# Patient Record
Sex: MALE | Race: WHITE | NOT HISPANIC OR LATINO | Employment: OTHER | URBAN - NONMETROPOLITAN AREA
[De-identification: names, ages, dates, MRNs, and addresses within clinical notes are randomized per-mention and may not be internally consistent; named-entity substitution may affect disease eponyms.]

---

## 2017-01-25 ENCOUNTER — LAB (OUTPATIENT)
Dept: LAB | Facility: OTHER | Age: 82
End: 2017-01-25

## 2017-01-25 DIAGNOSIS — E11.9 TYPE 2 DIABETES MELLITUS WITHOUT COMPLICATION (HCC): ICD-10-CM

## 2017-01-25 DIAGNOSIS — N18.30 CHRONIC KIDNEY DISEASE, STAGE 3 (HCC): ICD-10-CM

## 2017-01-25 DIAGNOSIS — D50.9 IRON DEFICIENCY ANEMIA: ICD-10-CM

## 2017-01-25 DIAGNOSIS — E78.5 HYPERLIPIDEMIA, UNSPECIFIED HYPERLIPIDEMIA TYPE: ICD-10-CM

## 2017-01-25 DIAGNOSIS — E78.5 HYPERLIPIDEMIA, UNSPECIFIED HYPERLIPIDEMIA TYPE: Primary | ICD-10-CM

## 2017-01-25 DIAGNOSIS — I10 ESSENTIAL HYPERTENSION: ICD-10-CM

## 2017-01-25 LAB
ALBUMIN SERPL-MCNC: 4.3 G/DL (ref 3.2–5.5)
ALBUMIN/GLOB SERPL: 1.3 G/DL (ref 1–3)
ALP SERPL-CCNC: 101 U/L (ref 15–121)
ALT SERPL W P-5'-P-CCNC: 15 U/L (ref 10–60)
ANION GAP SERPL CALCULATED.3IONS-SCNC: 8 MMOL/L (ref 5–15)
AST SERPL-CCNC: 20 U/L (ref 10–60)
BILIRUB SERPL-MCNC: 0.8 MG/DL (ref 0.2–1)
BUN BLD-MCNC: 42 MG/DL (ref 8–25)
BUN/CREAT SERPL: 20 (ref 7–25)
CALCIUM SPEC-SCNC: 9.7 MG/DL (ref 8.4–10.8)
CHLORIDE SERPL-SCNC: 102 MMOL/L (ref 100–112)
CHOLEST SERPL-MCNC: 246 MG/DL (ref 150–200)
CO2 SERPL-SCNC: 30 MMOL/L (ref 20–32)
CREAT BLD-MCNC: 2.1 MG/DL (ref 0.4–1.3)
DEPRECATED RDW RBC AUTO: 61 FL (ref 35.1–43.9)
ERYTHROCYTE [DISTWIDTH] IN BLOOD BY AUTOMATED COUNT: 19.7 % (ref 11.5–14.5)
GFR SERPL CREATININE-BSD FRML MDRD: 30 ML/MIN/1.73 (ref 42–98)
GLOBULIN UR ELPH-MCNC: 3.2 GM/DL (ref 2.5–4.6)
GLUCOSE BLD-MCNC: 112 MG/DL (ref 70–100)
HCT VFR BLD AUTO: 39 % (ref 39–49)
HDLC SERPL-MCNC: 42 MG/DL (ref 35–100)
HGB BLD-MCNC: 12.4 G/DL (ref 13.7–17.3)
LDLC SERPL CALC-MCNC: 143 MG/DL
LDLC/HDLC SERPL: 3.41 {RATIO}
MCH RBC QN AUTO: 27.3 PG (ref 26.5–34)
MCHC RBC AUTO-ENTMCNC: 31.8 G/DL (ref 31.5–36.3)
MCV RBC AUTO: 85.7 FL (ref 80–98)
PLATELET # BLD AUTO: 221 10*3/MM3 (ref 150–450)
PMV BLD AUTO: 10.2 FL (ref 8–12)
POTASSIUM BLD-SCNC: 4.8 MMOL/L (ref 3.4–5.4)
PROT SERPL-MCNC: 7.5 G/DL (ref 6.7–8.2)
RBC # BLD AUTO: 4.55 10*6/MM3 (ref 4.37–5.74)
SODIUM BLD-SCNC: 140 MMOL/L (ref 134–146)
TRIGL SERPL-MCNC: 304 MG/DL (ref 35–160)
VLDLC SERPL-MCNC: 60.8 MG/DL
WBC NRBC COR # BLD: 7.53 10*3/MM3 (ref 3.2–9.8)

## 2017-01-25 PROCEDURE — 83036 HEMOGLOBIN GLYCOSYLATED A1C: CPT | Performed by: INTERNAL MEDICINE

## 2017-01-25 PROCEDURE — 80061 LIPID PANEL: CPT | Performed by: INTERNAL MEDICINE

## 2017-01-25 PROCEDURE — 82728 ASSAY OF FERRITIN: CPT | Performed by: INTERNAL MEDICINE

## 2017-01-25 PROCEDURE — 36415 COLL VENOUS BLD VENIPUNCTURE: CPT | Performed by: INTERNAL MEDICINE

## 2017-01-25 PROCEDURE — 85027 COMPLETE CBC AUTOMATED: CPT | Performed by: INTERNAL MEDICINE

## 2017-01-25 PROCEDURE — 80053 COMPREHEN METABOLIC PANEL: CPT | Performed by: INTERNAL MEDICINE

## 2017-01-25 PROCEDURE — 82043 UR ALBUMIN QUANTITATIVE: CPT | Performed by: INTERNAL MEDICINE

## 2017-01-25 PROCEDURE — 84443 ASSAY THYROID STIM HORMONE: CPT | Performed by: INTERNAL MEDICINE

## 2017-01-26 LAB
ALBUMIN UR-MCNC: 0.7 MG/L
FERRITIN SERPL-MCNC: 22.6 NG/ML (ref 17.9–464)
HBA1C MFR BLD: 6.15 % (ref 4–5.6)
TSH SERPL DL<=0.05 MIU/L-ACNC: 1.52 MIU/ML (ref 0.46–4.68)

## 2017-01-27 RX ORDER — OMEPRAZOLE 40 MG/1
40 CAPSULE, DELAYED RELEASE ORAL EVERY MORNING
Qty: 30 CAPSULE | Refills: 11 | Status: SHIPPED | OUTPATIENT
Start: 2017-01-27 | End: 2018-01-22 | Stop reason: SDUPTHER

## 2017-02-07 ENCOUNTER — OFFICE VISIT (OUTPATIENT)
Dept: FAMILY MEDICINE CLINIC | Facility: CLINIC | Age: 82
End: 2017-02-07

## 2017-02-07 VITALS
TEMPERATURE: 97 F | HEART RATE: 60 BPM | WEIGHT: 171 LBS | BODY MASS INDEX: 25.33 KG/M2 | SYSTOLIC BLOOD PRESSURE: 130 MMHG | HEIGHT: 69 IN | DIASTOLIC BLOOD PRESSURE: 62 MMHG

## 2017-02-07 DIAGNOSIS — C61 MALIGNANT TUMOR OF PROSTATE (HCC): ICD-10-CM

## 2017-02-07 DIAGNOSIS — E11.9 TYPE 2 DIABETES MELLITUS WITHOUT COMPLICATION, WITHOUT LONG-TERM CURRENT USE OF INSULIN (HCC): Primary | ICD-10-CM

## 2017-02-07 DIAGNOSIS — D50.9 IRON DEFICIENCY ANEMIA, UNSPECIFIED IRON DEFICIENCY ANEMIA TYPE: ICD-10-CM

## 2017-02-07 DIAGNOSIS — K21.9 GASTROESOPHAGEAL REFLUX DISEASE WITHOUT ESOPHAGITIS: ICD-10-CM

## 2017-02-07 DIAGNOSIS — I10 ESSENTIAL HYPERTENSION: ICD-10-CM

## 2017-02-07 DIAGNOSIS — N18.30 CHRONIC KIDNEY DISEASE, STAGE 3 (HCC): ICD-10-CM

## 2017-02-07 DIAGNOSIS — E78.5 HYPERLIPIDEMIA, UNSPECIFIED HYPERLIPIDEMIA TYPE: ICD-10-CM

## 2017-02-07 DIAGNOSIS — I87.2 PERIPHERAL VENOUS INSUFFICIENCY: ICD-10-CM

## 2017-02-07 DIAGNOSIS — I73.9 PERIPHERAL VASCULAR DISEASE (HCC): ICD-10-CM

## 2017-02-07 PROCEDURE — 99214 OFFICE O/P EST MOD 30 MIN: CPT | Performed by: INTERNAL MEDICINE

## 2017-02-07 NOTE — PROGRESS NOTES
"Subjective       History of Present Illness     Sy Florez is a 88 y.o. male here for 6-month follow up on CKD, CAD, PVD, PAD, hypertension, high cholesterol, and impaired fasting glucose among other issues. He has not had any recent flares of his autoimmune skin disease, which fails to fit typical diagnostic criteria.  He continues on low dose oral prednisone daily, which doesn't seem to be causing elevation in glucose control.  He had an ECHO ordered last week by Dr. Mireles, cardiologist.  He hasn't heard the results currently.    He had a lumbar laminectomy completed at [facility] 11/2015. He continues to have claudication and neuropathy symptoms in his bilateral lower extremities.  He continues on Neurontin p.r.n. with only minimal improvement in symptoms.      He had Pneumovax 01/01/1998.  He will have Prevnar on his way out today. He had influenza vaccine in November.       GERD symptoms adequately controlled with Prilosec.      We started him on oral iron replacement therapy six months ago after iron levels drifted down with ferritin down to 21.2.  Dr. Jay did GI work up in the past including endoscopy as well as colonoscopy with no etiology found.  Iron level is improved with ferritin 22.6 on ferrous gluconate once daily.  Hemoglobin improved to 12.1.       We recommended he restart the Lipitor at least 3-4 times weekly six months ago  to get better control of his lipids with his CAD.  He reports he is not currently taking the Lipitor.  He has experienced myalgias/arthralgias in the past with statins.     Blood pressure 130/62, pulse 60, temperature 97 °F (36.1 °C), temperature source Oral, height 69\" (175.3 cm), weight 171 lb (77.6 kg).  Weight is up 7 pounds in the past six months.     The patient's relevant past medical, surgical, and social history was reviewed in Epic.   Lab results are reviewed with the patient today.  Fasting glucose 112.   A1c is 6.1.  Renal function stable with " creatinine 2..1.  Liver function normal.  Total cholesterol elevated, but improved at 246.  Triglycerides 304.  .  LDL/HDL ratio 3.41.    Review of Systems   Constitutional: Negative for chills, fatigue and fever.   HENT: Negative for congestion, ear pain, postnasal drip, sinus pressure and sore throat.    Respiratory: Negative for cough, shortness of breath and wheezing.    Cardiovascular: Negative for chest pain, palpitations and leg swelling.   Gastrointestinal: Negative for abdominal pain, blood in stool, constipation, diarrhea, nausea and vomiting.   Endocrine: Negative for cold intolerance, heat intolerance, polydipsia and polyuria.   Genitourinary: Negative for dysuria, frequency, hematuria and urgency.   Skin: Negative for rash.   Neurological: Negative for syncope and weakness.        Objective     Physical Exam   Constitutional: He is oriented to person, place, and time. He appears well-developed and well-nourished. No distress.   HENT:   Head: Normocephalic and atraumatic.   Nose: Right sinus exhibits no maxillary sinus tenderness and no frontal sinus tenderness. Left sinus exhibits no maxillary sinus tenderness and no frontal sinus tenderness.   Mouth/Throat: Uvula is midline, oropharynx is clear and moist and mucous membranes are normal. No oral lesions. No tonsillar exudate.   Eyes: Conjunctivae and EOM are normal. Pupils are equal, round, and reactive to light.   Neck: Trachea normal. Neck supple. No JVD present. Carotid bruit is not present. No tracheal deviation present. No thyroid mass and no thyromegaly present.   Cardiovascular: Normal rate, regular rhythm and normal heart sounds.   No extrasystoles are present. PMI is not displaced.    No murmur heard.  Pulmonary/Chest: Effort normal and breath sounds normal. No accessory muscle usage. No respiratory distress. He has no decreased breath sounds. He has no wheezes. He has no rhonchi. He has no rales.   Abdominal: Soft. Bowel sounds are  normal. He exhibits no distension. There is no hepatosplenomegaly. There is no tenderness.       Vascular Status -  His exam exhibits right foot edema (Trace edema bilateral ankles and diminished pulses bilaterally). His exam exhibits right foot vasculature abnormal. His exam exhibits left foot edema. His exam exhibits left foot vasculature abnormal.  Lymphadenopathy:     He has no cervical adenopathy.   Neurological: He is alert and oriented to person, place, and time. No cranial nerve deficit. Coordination normal.   Skin: Skin is warm, dry and intact. No rash noted. No cyanosis. Nails show no clubbing.   Psychiatric: He has a normal mood and affect. His speech is normal and behavior is normal. Thought content normal.   Vitals reviewed.       Assessment/Plan      Continue with current prescription medications.  He will have Prevnar on his way out today.     Scribed for Dr. Rodriguez by Johanna Reyes [facility].     Diagnoses and all orders for this visit:    Type 2 diabetes mellitus without complication, without long-term current use of insulin  -     Comprehensive Metabolic Panel; Future  -     CBC Auto Differential; Future  -     Vitamin B12; Future  -     Ferritin; Future  -     Hemoglobin A1c; Future  -     LDL Cholesterol, Direct; Future  -     TSH; Future  -     PSA; Future    Essential hypertension    Hyperlipidemia, unspecified hyperlipidemia type  Comments:  Stopped Lipitor, fall  due to symptoms and lower extremities which may have been statin related or may have been due to his neurogenic and vascular claudication  Orders:  -     LDL Cholesterol, Direct; Future  -     Triglycerides; Future    Peripheral vascular disease    Gastroesophageal reflux disease without esophagitis    Chronic kidney disease, stage 3    Iron deficiency anemia, unspecified iron deficiency anemia type  -     Vitamin B12; Future  -     Ferritin; Future    Malignant tumor of prostate  -     PSA; Future    Peripheral venous  insufficiency    Other orders  -     Ferrous Gluconate 325 (36 FE) MG tablet; Take 1 tablet by mouth Daily.      Lab on 01/25/2017   Component Date Value Ref Range Status   • Glucose 01/25/2017 112* 70 - 100 mg/dL Final   • BUN 01/25/2017 42* 8 - 25 mg/dL Final   • Creatinine 01/25/2017 2.10* 0.40 - 1.30 mg/dL Final   • Sodium 01/25/2017 140  134 - 146 mmol/L Final   • Potassium 01/25/2017 4.8  3.4 - 5.4 mmol/L Final   • Chloride 01/25/2017 102  100 - 112 mmol/L Final   • CO2 01/25/2017 30.0  20.0 - 32.0 mmol/L Final   • Calcium 01/25/2017 9.7  8.4 - 10.8 mg/dL Final   • Total Protein 01/25/2017 7.5  6.7 - 8.2 g/dL Final   • Albumin 01/25/2017 4.30  3.20 - 5.50 g/dL Final   • ALT (SGPT) 01/25/2017 15  10 - 60 U/L Final   • AST (SGOT) 01/25/2017 20  10 - 60 U/L Final   • Alkaline Phosphatase 01/25/2017 101  15 - 121 U/L Final   • Total Bilirubin 01/25/2017 0.8  0.2 - 1.0 mg/dL Final   • eGFR Non African Amer 01/25/2017 30* 42 - 98 mL/min/1.73 Final   • Globulin 01/25/2017 3.2  2.5 - 4.6 gm/dL Final   • A/G Ratio 01/25/2017 1.3  1.0 - 3.0 g/dL Final   • BUN/Creatinine Ratio 01/25/2017 20.0  7.0 - 25.0 Final   • Anion Gap 01/25/2017 8.0  5.0 - 15.0 mmol/L Final   • Hemoglobin A1C 01/25/2017 6.15* 4 - 5.6 % Final   • WBC 01/25/2017 7.53  3.20 - 9.80 10*3/mm3 Final   • RBC 01/25/2017 4.55  4.37 - 5.74 10*6/mm3 Final   • Hemoglobin 01/25/2017 12.4* 13.7 - 17.3 g/dL Final   • Hematocrit 01/25/2017 39.0  39.0 - 49.0 % Final   • MCV 01/25/2017 85.7  80.0 - 98.0 fL Final   • MCH 01/25/2017 27.3  26.5 - 34.0 pg Final   • MCHC 01/25/2017 31.8  31.5 - 36.3 g/dL Final   • RDW 01/25/2017 19.7* 11.5 - 14.5 % Final   • RDW-SD 01/25/2017 61.0* 35.1 - 43.9 fl Final   • MPV 01/25/2017 10.2  8.0 - 12.0 fL Final   • Platelets 01/25/2017 221  150 - 450 10*3/mm3 Final   • Ferritin 01/25/2017 22.60  17.90 - 464.00 ng/mL Final   • TSH 01/25/2017 1.520  0.460 - 4.680 mIU/mL Final   • Microalbumin, Urine 01/25/2017 0.7  mg/L Final   • Total  Cholesterol 01/25/2017 246* 150 - 200 mg/dL Final   • Triglycerides 01/25/2017 304* 35 - 160 mg/dL Final   • HDL Cholesterol 01/25/2017 42  35 - 100 mg/dL Final   • LDL Cholesterol  01/25/2017 143  mg/dL Final   • VLDL Cholesterol 01/25/2017 60.8  mg/dL Final   • LDL/HDL Ratio 01/25/2017 3.41   Final   ]

## 2017-02-27 RX ORDER — GABAPENTIN 600 MG/1
TABLET ORAL
Qty: 60 TABLET | Refills: 5 | Status: SHIPPED | OUTPATIENT
Start: 2017-02-27 | End: 2017-07-10 | Stop reason: SDUPTHER

## 2017-03-13 RX ORDER — RAMIPRIL 5 MG/1
5 CAPSULE ORAL DAILY
Qty: 30 CAPSULE | Refills: 5 | Status: SHIPPED | OUTPATIENT
Start: 2017-03-13 | End: 2017-06-14 | Stop reason: SDUPTHER

## 2017-05-01 RX ORDER — PREDNISONE 1 MG/1
TABLET ORAL
Qty: 60 TABLET | Refills: 6 | Status: SHIPPED | OUTPATIENT
Start: 2017-05-01 | End: 2017-07-10

## 2017-06-14 RX ORDER — RAMIPRIL 5 MG/1
5 CAPSULE ORAL DAILY
Qty: 30 CAPSULE | Refills: 5 | Status: SHIPPED | OUTPATIENT
Start: 2017-06-14 | End: 2017-08-18 | Stop reason: SDUPTHER

## 2017-07-10 ENCOUNTER — OFFICE VISIT (OUTPATIENT)
Dept: FAMILY MEDICINE CLINIC | Facility: CLINIC | Age: 82
End: 2017-07-10

## 2017-07-10 VITALS
WEIGHT: 167.4 LBS | TEMPERATURE: 98 F | SYSTOLIC BLOOD PRESSURE: 112 MMHG | DIASTOLIC BLOOD PRESSURE: 68 MMHG | HEIGHT: 69 IN | BODY MASS INDEX: 24.79 KG/M2 | HEART RATE: 64 BPM

## 2017-07-10 DIAGNOSIS — I73.9 CLAUDICATION (HCC): Primary | Chronic | ICD-10-CM

## 2017-07-10 DIAGNOSIS — E78.5 HYPERLIPIDEMIA, UNSPECIFIED HYPERLIPIDEMIA TYPE: Chronic | ICD-10-CM

## 2017-07-10 DIAGNOSIS — I73.9 PERIPHERAL VASCULAR DISEASE (HCC): Chronic | ICD-10-CM

## 2017-07-10 PROCEDURE — 99213 OFFICE O/P EST LOW 20 MIN: CPT | Performed by: INTERNAL MEDICINE

## 2017-07-10 RX ORDER — GABAPENTIN 600 MG/1
TABLET ORAL
Qty: 60 TABLET | Refills: 5 | Status: SHIPPED | OUTPATIENT
Start: 2017-07-10 | End: 2018-03-12 | Stop reason: SDUPTHER

## 2017-07-10 NOTE — PROGRESS NOTES
Subjective     History of Present Illness     Sy Florez is a 88 y.o. male here for evaluation of chronic bilateral lower extremity pain.  He had a lumbar laminectomy completed at [facility] 11/2015 in hopes that symptoms were neurogenic, but he did not experience any significant improvement in symptoms. He continues to have symptoms of vascular claudication and also some neuropathy symptoms in his bilateral lower extremities. He continues on Neurontin, but reports the pain has gotten to the point, it is greatly limiting his walking and activities of daily living. Pain starts in the thighs and progresses down to the feet.  He can walk only two blocks without having to stop due to pain.   Pain is relieved with sitting for 10-15 minutes.  He has undergone extensive revascularization surgeries in the past.  Dr. Brennan has told him that he has no additional revascularization procedures to offer.  His daughter is a nurse practitioner in [place] and is wanting him to go to a vascular surgeon in [place] for further evaluation..   We are referring him to vascular surgery in [place].  He will notify us of the name of the vascular surgeon his daughter is recommending.  He denies claudication symptoms in the upper extremities. His Neurontin is refilled today. Patient understands the risks associated with this controlled medication, including tolerance and addiction.        Review of Systems   Constitutional: Negative for chills, fatigue and fever.   HENT: Negative for congestion, ear pain, postnasal drip, sinus pressure and sore throat.    Respiratory: Negative for cough, shortness of breath and wheezing.    Cardiovascular: Negative for chest pain, palpitations and leg swelling.   Gastrointestinal: Negative for abdominal pain, blood in stool, constipation, diarrhea, nausea and vomiting.   Endocrine: Negative for cold intolerance, heat intolerance, polydipsia and polyuria.   Genitourinary:  "Negative for dysuria, frequency, hematuria and urgency.   Skin: Negative for rash.   Neurological: Negative for syncope and weakness.     PHQ-9 Depression Screening 7/10/2017   Little interest or pleasure in doing things 0   Feeling down, depressed, or hopeless 0   PHQ-9 Total Score 0        Objective      Vitals:    07/10/17 1109   BP: 112/68   Pulse: 64   Temp: 98 °F (36.7 °C)   TempSrc: Oral   Weight: 167 lb 6.4 oz (75.9 kg)   Height: 69\" (175.3 cm)       Physical Exam   Constitutional: He is oriented to person, place, and time. He appears well-developed and well-nourished. No distress.   HENT:   Mouth/Throat: Uvula is midline.   Eyes: Conjunctivae are normal.   Neck: Trachea normal. Neck supple. No JVD present. Carotid bruit is not present. No tracheal deviation present. No thyroid mass and no thyromegaly present.   Cardiovascular: Normal rate, regular rhythm and normal heart sounds.   No extrasystoles are present. PMI is not displaced.    No murmur heard.  Pulmonary/Chest: Effort normal and breath sounds normal. No accessory muscle usage. No respiratory distress. He has no decreased breath sounds. He has no wheezes. He has no rhonchi. He has no rales.   Abdominal: Soft. Bowel sounds are normal. He exhibits no distension. There is no hepatosplenomegaly. There is no tenderness.       Vascular Status -  His exam exhibits right foot edema (1+ pitting edema bilateral lower extermities with diminished pulses bilateral ankles. ). His exam exhibits right foot vasculature abnormal. His exam exhibits left foot edema. His exam exhibits left foot vasculature abnormal.  Lymphadenopathy:     He has no cervical adenopathy.   Neurological: He is alert and oriented to person, place, and time. No cranial nerve deficit. Coordination normal.   Skin: Skin is warm, dry and intact. No rash noted. No cyanosis. Nails show no clubbing.   Psychiatric: He has a normal mood and affect. His speech is normal and behavior is normal. Thought " content normal.   Vitals reviewed.     Future Appointments  Date Time Provider Department Center   8/8/2017 10:30 AM John Rodriguez MD [facility] None         Assessment/Plan      I am referring him to vascular surgery in [place] where his daughter lives.  She will call and notify us of the name of the vascular surgeon we will be referring him to.       He is given a refill on Neurontin 600 mg to take one tablet b.i.d.  Patient understands the risks associated with this controlled medication, including tolerance and addiction.  He also agrees to only obtain this medication from me, and not from a another provider, unless that provider is covering for me in my absence.  He also agrees to be compliant in dosing, and not self adjust the dose of medication.  A signed controlled substance agreement is on file, and he has received a controlled substance education sheet at this a previous visit.  He has also signed a consent for treatment with a controlled substance as per [facility] policy. TRAE was obtained.      Scribed for Dr. Rodriguez by Johanna Reyes [facility].     Diagnoses and all orders for this visit:    Claudication    Peripheral vascular disease    Hyperlipidemia, unspecified hyperlipidemia type    Other orders  -     gabapentin (NEURONTIN) 600 MG tablet; Take one tab twice daily      No visits with results within 3 Week(s) from this visit.  Latest known visit with results is:    Lab on 01/25/2017   Component Date Value Ref Range Status   • Glucose 01/25/2017 112* 70 - 100 mg/dL Final   • BUN 01/25/2017 42* 8 - 25 mg/dL Final   • Creatinine 01/25/2017 2.10* 0.40 - 1.30 mg/dL Final   • Sodium 01/25/2017 140  134 - 146 mmol/L Final   • Potassium 01/25/2017 4.8  3.4 - 5.4 mmol/L Final   • Chloride 01/25/2017 102  100 - 112 mmol/L Final   • CO2 01/25/2017 30.0  20.0 - 32.0 mmol/L Final   • Calcium 01/25/2017 9.7  8.4 - 10.8 mg/dL Final   • Total Protein 01/25/2017 7.5  6.7 - 8.2 g/dL Final   • Albumin  01/25/2017 4.30  3.20 - 5.50 g/dL Final   • ALT (SGPT) 01/25/2017 15  10 - 60 U/L Final   • AST (SGOT) 01/25/2017 20  10 - 60 U/L Final   • Alkaline Phosphatase 01/25/2017 101  15 - 121 U/L Final   • Total Bilirubin 01/25/2017 0.8  0.2 - 1.0 mg/dL Final   • eGFR Non African Amer 01/25/2017 30* 42 - 98 mL/min/1.73 Final   • Globulin 01/25/2017 3.2  2.5 - 4.6 gm/dL Final   • A/G Ratio 01/25/2017 1.3  1.0 - 3.0 g/dL Final   • BUN/Creatinine Ratio 01/25/2017 20.0  7.0 - 25.0 Final   • Anion Gap 01/25/2017 8.0  5.0 - 15.0 mmol/L Final   • Hemoglobin A1C 01/25/2017 6.15* 4 - 5.6 % Final   • WBC 01/25/2017 7.53  3.20 - 9.80 10*3/mm3 Final   • RBC 01/25/2017 4.55  4.37 - 5.74 10*6/mm3 Final   • Hemoglobin 01/25/2017 12.4* 13.7 - 17.3 g/dL Final   • Hematocrit 01/25/2017 39.0  39.0 - 49.0 % Final   • MCV 01/25/2017 85.7  80.0 - 98.0 fL Final   • MCH 01/25/2017 27.3  26.5 - 34.0 pg Final   • MCHC 01/25/2017 31.8  31.5 - 36.3 g/dL Final   • RDW 01/25/2017 19.7* 11.5 - 14.5 % Final   • RDW-SD 01/25/2017 61.0* 35.1 - 43.9 fl Final   • MPV 01/25/2017 10.2  8.0 - 12.0 fL Final   • Platelets 01/25/2017 221  150 - 450 10*3/mm3 Final   • Ferritin 01/25/2017 22.60  17.90 - 464.00 ng/mL Final   • TSH 01/25/2017 1.520  0.460 - 4.680 mIU/mL Final   • Microalbumin, Urine 01/25/2017 0.7  mg/L Final   • Total Cholesterol 01/25/2017 246* 150 - 200 mg/dL Final   • Triglycerides 01/25/2017 304* 35 - 160 mg/dL Final   • HDL Cholesterol 01/25/2017 42  35 - 100 mg/dL Final   • LDL Cholesterol  01/25/2017 143  mg/dL Final   • VLDL Cholesterol 01/25/2017 60.8  mg/dL Final   • LDL/HDL Ratio 01/25/2017 3.41   Final   ]

## 2017-07-11 DIAGNOSIS — I73.9 PVD (PERIPHERAL VASCULAR DISEASE) (HCC): ICD-10-CM

## 2017-07-11 DIAGNOSIS — I73.9 CLAUDICATION (HCC): Primary | ICD-10-CM

## 2017-08-02 ENCOUNTER — LAB (OUTPATIENT)
Dept: LAB | Facility: OTHER | Age: 82
End: 2017-08-02

## 2017-08-02 DIAGNOSIS — C61 MALIGNANT TUMOR OF PROSTATE (HCC): ICD-10-CM

## 2017-08-02 DIAGNOSIS — D50.9 IRON DEFICIENCY ANEMIA, UNSPECIFIED IRON DEFICIENCY ANEMIA TYPE: ICD-10-CM

## 2017-08-02 DIAGNOSIS — E11.9 TYPE 2 DIABETES MELLITUS WITHOUT COMPLICATION, WITHOUT LONG-TERM CURRENT USE OF INSULIN (HCC): ICD-10-CM

## 2017-08-02 DIAGNOSIS — E78.5 HYPERLIPIDEMIA, UNSPECIFIED HYPERLIPIDEMIA TYPE: ICD-10-CM

## 2017-08-02 LAB
ALBUMIN SERPL-MCNC: 4.1 G/DL (ref 3.2–5.5)
ALBUMIN/GLOB SERPL: 1.4 G/DL (ref 1–3)
ALP SERPL-CCNC: 121 U/L (ref 15–121)
ALT SERPL W P-5'-P-CCNC: 14 U/L (ref 10–60)
ANION GAP SERPL CALCULATED.3IONS-SCNC: 12 MMOL/L (ref 5–15)
ARTICHOKE IGE QN: 92 MG/DL (ref 0–129)
AST SERPL-CCNC: 21 U/L (ref 10–60)
BASOPHILS # BLD AUTO: 0.07 10*3/MM3 (ref 0–0.2)
BASOPHILS NFR BLD AUTO: 1 % (ref 0–2)
BILIRUB SERPL-MCNC: 0.7 MG/DL (ref 0.2–1)
BUN BLD-MCNC: 41 MG/DL (ref 8–25)
BUN/CREAT SERPL: 17.8 (ref 7–25)
CALCIUM SPEC-SCNC: 9.4 MG/DL (ref 8.4–10.8)
CHLORIDE SERPL-SCNC: 101 MMOL/L (ref 100–112)
CO2 SERPL-SCNC: 26 MMOL/L (ref 20–32)
CREAT BLD-MCNC: 2.3 MG/DL (ref 0.4–1.3)
DEPRECATED RDW RBC AUTO: 45.8 FL (ref 35.1–43.9)
EOSINOPHIL # BLD AUTO: 0.32 10*3/MM3 (ref 0–0.7)
EOSINOPHIL NFR BLD AUTO: 4.6 % (ref 0–7)
ERYTHROCYTE [DISTWIDTH] IN BLOOD BY AUTOMATED COUNT: 14.5 % (ref 11.5–14.5)
GFR SERPL CREATININE-BSD FRML MDRD: 27 ML/MIN/1.73 (ref 42–98)
GLOBULIN UR ELPH-MCNC: 3 GM/DL (ref 2.5–4.6)
GLUCOSE BLD-MCNC: 131 MG/DL (ref 70–100)
HBA1C MFR BLD: 6 % (ref 4–5.6)
HCT VFR BLD AUTO: 34.3 % (ref 39–49)
HGB BLD-MCNC: 11 G/DL (ref 13.7–17.3)
LYMPHOCYTES # BLD AUTO: 1.82 10*3/MM3 (ref 0.6–4.2)
LYMPHOCYTES NFR BLD AUTO: 26.3 % (ref 10–50)
MCH RBC QN AUTO: 28.2 PG (ref 26.5–34)
MCHC RBC AUTO-ENTMCNC: 32.1 G/DL (ref 31.5–36.3)
MCV RBC AUTO: 87.9 FL (ref 80–98)
MONOCYTES # BLD AUTO: 0.57 10*3/MM3 (ref 0–0.9)
MONOCYTES NFR BLD AUTO: 8.2 % (ref 0–12)
NEUTROPHILS # BLD AUTO: 4.15 10*3/MM3 (ref 2–8.6)
NEUTROPHILS NFR BLD AUTO: 59.9 % (ref 37–80)
PLATELET # BLD AUTO: 240 10*3/MM3 (ref 150–450)
PMV BLD AUTO: 10.2 FL (ref 8–12)
POTASSIUM BLD-SCNC: 4.3 MMOL/L (ref 3.4–5.4)
PROT SERPL-MCNC: 7.1 G/DL (ref 6.7–8.2)
RBC # BLD AUTO: 3.9 10*6/MM3 (ref 4.37–5.74)
SODIUM BLD-SCNC: 139 MMOL/L (ref 134–146)
TRIGL SERPL-MCNC: 280 MG/DL (ref 35–160)
WBC NRBC COR # BLD: 6.93 10*3/MM3 (ref 3.2–9.8)

## 2017-08-02 PROCEDURE — 36415 COLL VENOUS BLD VENIPUNCTURE: CPT | Performed by: INTERNAL MEDICINE

## 2017-08-02 PROCEDURE — 83721 ASSAY OF BLOOD LIPOPROTEIN: CPT | Performed by: INTERNAL MEDICINE

## 2017-08-02 PROCEDURE — 84478 ASSAY OF TRIGLYCERIDES: CPT | Performed by: INTERNAL MEDICINE

## 2017-08-02 PROCEDURE — 82607 VITAMIN B-12: CPT | Performed by: INTERNAL MEDICINE

## 2017-08-02 PROCEDURE — 85025 COMPLETE CBC W/AUTO DIFF WBC: CPT | Performed by: INTERNAL MEDICINE

## 2017-08-02 PROCEDURE — 80053 COMPREHEN METABOLIC PANEL: CPT | Performed by: INTERNAL MEDICINE

## 2017-08-02 PROCEDURE — 82728 ASSAY OF FERRITIN: CPT | Performed by: INTERNAL MEDICINE

## 2017-08-02 PROCEDURE — 84443 ASSAY THYROID STIM HORMONE: CPT | Performed by: INTERNAL MEDICINE

## 2017-08-02 PROCEDURE — 84153 ASSAY OF PSA TOTAL: CPT | Performed by: INTERNAL MEDICINE

## 2017-08-02 PROCEDURE — 83036 HEMOGLOBIN GLYCOSYLATED A1C: CPT | Performed by: INTERNAL MEDICINE

## 2017-08-03 LAB
FERRITIN SERPL-MCNC: 20.7 NG/ML (ref 17.9–464)
PSA SERPL-MCNC: 0.15 NG/ML (ref 0–4)
TSH SERPL DL<=0.05 MIU/L-ACNC: 1.8 MIU/ML (ref 0.46–4.68)
VIT B12 BLD-MCNC: 465 PG/ML (ref 239–931)

## 2017-08-08 ENCOUNTER — OFFICE VISIT (OUTPATIENT)
Dept: FAMILY MEDICINE CLINIC | Facility: CLINIC | Age: 82
End: 2017-08-08

## 2017-08-08 VITALS
WEIGHT: 169 LBS | BODY MASS INDEX: 25.03 KG/M2 | HEART RATE: 64 BPM | HEIGHT: 69 IN | TEMPERATURE: 97.6 F | SYSTOLIC BLOOD PRESSURE: 120 MMHG | DIASTOLIC BLOOD PRESSURE: 60 MMHG

## 2017-08-08 DIAGNOSIS — I10 ESSENTIAL HYPERTENSION: Chronic | ICD-10-CM

## 2017-08-08 DIAGNOSIS — N18.30 CHRONIC KIDNEY DISEASE, STAGE 3 (HCC): Chronic | ICD-10-CM

## 2017-08-08 DIAGNOSIS — E11.22 TYPE 2 DIABETES MELLITUS WITH STAGE 3 CHRONIC KIDNEY DISEASE, WITHOUT LONG-TERM CURRENT USE OF INSULIN (HCC): Primary | Chronic | ICD-10-CM

## 2017-08-08 DIAGNOSIS — D63.1 ANEMIA IN CHRONIC KIDNEY DISEASE: Chronic | ICD-10-CM

## 2017-08-08 DIAGNOSIS — I25.10 CORONARY ARTERIOSCLEROSIS: Chronic | ICD-10-CM

## 2017-08-08 DIAGNOSIS — E78.2 MIXED HYPERLIPIDEMIA: Chronic | ICD-10-CM

## 2017-08-08 DIAGNOSIS — I87.2 PERIPHERAL VENOUS INSUFFICIENCY: Chronic | ICD-10-CM

## 2017-08-08 DIAGNOSIS — K21.9 GASTROESOPHAGEAL REFLUX DISEASE WITHOUT ESOPHAGITIS: Chronic | ICD-10-CM

## 2017-08-08 DIAGNOSIS — N18.9 ANEMIA IN CHRONIC KIDNEY DISEASE: Chronic | ICD-10-CM

## 2017-08-08 DIAGNOSIS — N18.30 TYPE 2 DIABETES MELLITUS WITH STAGE 3 CHRONIC KIDNEY DISEASE, WITHOUT LONG-TERM CURRENT USE OF INSULIN (HCC): Primary | Chronic | ICD-10-CM

## 2017-08-08 DIAGNOSIS — I73.9 PERIPHERAL VASCULAR DISEASE (HCC): Chronic | ICD-10-CM

## 2017-08-08 PROCEDURE — 99214 OFFICE O/P EST MOD 30 MIN: CPT | Performed by: INTERNAL MEDICINE

## 2017-08-08 PROCEDURE — 96372 THER/PROPH/DIAG INJ SC/IM: CPT | Performed by: INTERNAL MEDICINE

## 2017-08-08 RX ORDER — CHOLECALCIFEROL (VITAMIN D3) 125 MCG
500 CAPSULE ORAL DAILY
COMMUNITY
Start: 2017-08-08 | End: 2017-12-01

## 2017-08-08 RX ORDER — CYANOCOBALAMIN 1000 UG/ML
1000 INJECTION, SOLUTION INTRAMUSCULAR; SUBCUTANEOUS ONCE
Status: COMPLETED | OUTPATIENT
Start: 2017-08-08 | End: 2017-08-08

## 2017-08-08 RX ADMIN — CYANOCOBALAMIN 1000 MCG: 1000 INJECTION, SOLUTION INTRAMUSCULAR; SUBCUTANEOUS at 16:20

## 2017-08-08 NOTE — PROGRESS NOTES
Subjective     Sy Folrez is a 88 y.o. male.     History of Present Illness   Grant is a very pleasant, very complex patient here for 6-month follow up of CKD, CAD, PVD, PAD, hypertension, high cholesterol, iron deficiency anemia, and diet-controlled type 2 diabetes, among other issues. He has not had any recent flares of his autoimmune skin disease, which fails to fit typical diagnostic criteria.    He is struggling with chronic claudication symptoms of the bilateral lower extremity.  He has both neurogenic and vascular claudication.  It has been difficult to clearly ascertain which of these causes the majority of his symptoms.  He seems to have multifactorial lower extremity pain.  He will be seeing a vascular surgeon at [facility] later this week to see if there is anything else they can offer.  His recent lumbar laminectomy failed to produce any significant improvement in symptoms.    His blood pressure is well controlled today.  His weight is stable.    His labs are all reviewed with results listed below.  Fasting glucose is 131 and A1c is 6.0.  LDL is 92 without statin therapy.  The patient has discontinued statins on multiple occasions feeling that they worsened his lower extremity symptoms, but he is not really sure.  He agrees to the open to considering resumption of statin therapy or other lipid management options if recommended by the vascular surgeon at [facility].    Struggle with bilateral lower extremity pain and claudication features.  He is describing is very likely some rest claudication now at night.  He denies any ischemic digits.    His labs are all reviewed with results listed below.  Vitamin B12 is 465.  Serum creatinine is gradually increasing, now at 2.3.  Ferritin is relatively stable at 21.      Review of Systems   Constitutional: Negative for chills, fatigue and fever.   HENT: Negative for congestion, ear pain, postnasal drip, sinus pressure and sore throat.   "  Respiratory: Negative for cough, shortness of breath and wheezing.    Cardiovascular: Negative for chest pain, palpitations and leg swelling.   Gastrointestinal: Negative for abdominal pain, blood in stool, constipation, diarrhea, nausea and vomiting.   Endocrine: Negative for cold intolerance, heat intolerance, polydipsia and polyuria.   Genitourinary: Negative for dysuria, frequency, hematuria and urgency.   Musculoskeletal: Positive for gait problem and myalgias.   Skin: Negative for rash.   Neurological: Negative for syncope and weakness.       Objective     /60  Pulse 64  Temp 97.6 °F (36.4 °C) (Oral)   Ht 69\" (175.3 cm)  Wt 169 lb (76.7 kg)  BMI 24.96 kg/m2    Physical Exam   Constitutional: He is oriented to person, place, and time. He appears well-developed and well-nourished. No distress.   HENT:   Head: Normocephalic and atraumatic.   Nose: Right sinus exhibits no maxillary sinus tenderness and no frontal sinus tenderness. Left sinus exhibits no maxillary sinus tenderness and no frontal sinus tenderness.   Mouth/Throat: Uvula is midline, oropharynx is clear and moist and mucous membranes are normal. No oral lesions. No tonsillar exudate.   Eyes: Conjunctivae and EOM are normal. Pupils are equal, round, and reactive to light.   Neck: Trachea normal. Neck supple. No JVD present. Carotid bruit is not present. No thyromegaly present.   Cardiovascular: Normal rate, regular rhythm and normal heart sounds.   No extrasystoles are present.   No murmur heard.  Pulmonary/Chest: Effort normal and breath sounds normal. No accessory muscle usage. No respiratory distress (chronic lung sounds.). He has no decreased breath sounds. He has no wheezes. He has no rhonchi. He has no rales.   Abdominal: Soft. Bowel sounds are normal. He exhibits no distension. There is no hepatosplenomegaly. There is no tenderness.   Musculoskeletal: Edema: trace dependent peripheral edema     Lymphadenopathy:     He has no cervical " adenopathy.   Neurological: He is alert and oriented to person, place, and time. Coordination normal.   Skin: Skin is warm, dry and intact. No rash noted.   Sun damaged skin   Psychiatric: He has a normal mood and affect. His speech is normal and behavior is normal.   Vitals reviewed.      PHQ-9 Depression Screening 7/10/2017   Little interest or pleasure in doing things 0   Feeling down, depressed, or hopeless 0   PHQ-9 Total Score 0       Assessment/Plan   Continue current  multidrug therapy to continue to address the issues below.    We are giving him a vitamin B12 shot today see if that is helpful.  I've asked the patient start taking OTC vitamin B12 500 daily.    Continue to avoid NSAIDs and other nephrotoxic drugs.    Continue the omeprazole daily.    Continue to keep legs elevated when not ambulating.    Return to clinic in 6 months with fasting labs prior.  Diagnoses and all orders for this visit:    Type 2 diabetes mellitus with stage 3 chronic kidney disease, without long-term current use of insulin  -     Comprehensive Metabolic Panel; Future  -     CBC Auto Differential; Future  -     Hemoglobin A1c; Future  -     Microalbumin / Creatinine Urine Ratio; Future    Essential hypertension    Peripheral vascular disease    Mixed hyperlipidemia  -     Lipid Panel; Future    Coronary arteriosclerosis    Gastroesophageal reflux disease without esophagitis    Chronic kidney disease, stage 3    Peripheral venous insufficiency    Anemia in chronic kidney disease  -     Vitamin B12; Future  -     Ferritin; Future  -     cyanocobalamin injection 1,000 mcg; Inject 1 mL into the shoulder, thigh, or buttocks 1 (One) Time.    Other orders  -     vitamin B-12 (CYANOCOBALAMIN) 500 MCG tablet; Take 1 tablet by mouth Daily.      Lab on 08/02/2017   Component Date Value Ref Range Status   • Glucose 08/02/2017 131* 70 - 100 mg/dL Final   • BUN 08/02/2017 41* 8 - 25 mg/dL Final   • Creatinine 08/02/2017 2.30* 0.40 - 1.30 mg/dL  Final   • Sodium 08/02/2017 139  134 - 146 mmol/L Final   • Potassium 08/02/2017 4.3  3.4 - 5.4 mmol/L Final   • Chloride 08/02/2017 101  100 - 112 mmol/L Final   • CO2 08/02/2017 26.0  20.0 - 32.0 mmol/L Final   • Calcium 08/02/2017 9.4  8.4 - 10.8 mg/dL Final   • Total Protein 08/02/2017 7.1  6.7 - 8.2 g/dL Final   • Albumin 08/02/2017 4.10  3.20 - 5.50 g/dL Final   • ALT (SGPT) 08/02/2017 14  10 - 60 U/L Final   • AST (SGOT) 08/02/2017 21  10 - 60 U/L Final   • Alkaline Phosphatase 08/02/2017 121  15 - 121 U/L Final   • Total Bilirubin 08/02/2017 0.7  0.2 - 1.0 mg/dL Final   • eGFR Non African Amer 08/02/2017 27* 42 - 98 mL/min/1.73 Final   • Globulin 08/02/2017 3.0  2.5 - 4.6 gm/dL Final   • A/G Ratio 08/02/2017 1.4  1.0 - 3.0 g/dL Final   • BUN/Creatinine Ratio 08/02/2017 17.8  7.0 - 25.0 Final   • Anion Gap 08/02/2017 12.0  5.0 - 15.0 mmol/L Final   • WBC 08/02/2017 6.93  3.20 - 9.80 10*3/mm3 Final   • RBC 08/02/2017 3.90* 4.37 - 5.74 10*6/mm3 Final   • Hemoglobin 08/02/2017 11.0* 13.7 - 17.3 g/dL Final   • Hematocrit 08/02/2017 34.3* 39.0 - 49.0 % Final   • MCV 08/02/2017 87.9  80.0 - 98.0 fL Final   • MCH 08/02/2017 28.2  26.5 - 34.0 pg Final   • MCHC 08/02/2017 32.1  31.5 - 36.3 g/dL Final   • RDW 08/02/2017 14.5  11.5 - 14.5 % Final   • RDW-SD 08/02/2017 45.8* 35.1 - 43.9 fl Final   • MPV 08/02/2017 10.2  8.0 - 12.0 fL Final   • Platelets 08/02/2017 240  150 - 450 10*3/mm3 Final   • Neutrophil % 08/02/2017 59.9  37.0 - 80.0 % Final   • Lymphocyte % 08/02/2017 26.3  10.0 - 50.0 % Final   • Monocyte % 08/02/2017 8.2  0.0 - 12.0 % Final   • Eosinophil % 08/02/2017 4.6  0.0 - 7.0 % Final   • Basophil % 08/02/2017 1.0  0.0 - 2.0 % Final   • Neutrophils, Absolute 08/02/2017 4.15  2.00 - 8.60 10*3/mm3 Final   • Lymphocytes, Absolute 08/02/2017 1.82  0.60 - 4.20 10*3/mm3 Final   • Monocytes, Absolute 08/02/2017 0.57  0.00 - 0.90 10*3/mm3 Final   • Eosinophils, Absolute 08/02/2017 0.32  0.00 - 0.70 10*3/mm3 Final    • Basophils, Absolute 08/02/2017 0.07  0.00 - 0.20 10*3/mm3 Final   • Vitamin B-12 08/02/2017 465  239 - 931 pg/mL Final   • Ferritin 08/02/2017 20.70  17.90 - 464.00 ng/mL Final   • Hemoglobin A1C 08/02/2017 6.0* 4 - 5.6 % Final   • LDL Cholesterol  08/02/2017 92  0 - 129 mg/dL Final   • TSH 08/02/2017 1.800  0.460 - 4.680 mIU/mL Final   • PSA 08/02/2017 0.154  0.000 - 4.000 ng/mL Final   • Triglycerides 08/02/2017 280* 35 - 160 mg/dL Final   ]

## 2017-08-18 RX ORDER — RAMIPRIL 5 MG/1
CAPSULE ORAL
Qty: 30 CAPSULE | Refills: 5 | Status: SHIPPED | OUTPATIENT
Start: 2017-08-18 | End: 2017-09-19 | Stop reason: SDUPTHER

## 2017-08-23 ENCOUNTER — OFFICE VISIT (OUTPATIENT)
Dept: FAMILY MEDICINE CLINIC | Facility: CLINIC | Age: 82
End: 2017-08-23

## 2017-08-23 VITALS
SYSTOLIC BLOOD PRESSURE: 120 MMHG | HEART RATE: 56 BPM | TEMPERATURE: 97.6 F | BODY MASS INDEX: 24.76 KG/M2 | HEIGHT: 69 IN | DIASTOLIC BLOOD PRESSURE: 70 MMHG | WEIGHT: 167.2 LBS

## 2017-08-23 DIAGNOSIS — N18.30 CHRONIC KIDNEY DISEASE, STAGE 3 (HCC): Primary | Chronic | ICD-10-CM

## 2017-08-23 DIAGNOSIS — I73.9 CLAUDICATION (HCC): Chronic | ICD-10-CM

## 2017-08-23 DIAGNOSIS — I73.9 PERIPHERAL VASCULAR DISEASE (HCC): Chronic | ICD-10-CM

## 2017-08-23 DIAGNOSIS — E11.22 TYPE 2 DIABETES MELLITUS WITH STAGE 3 CHRONIC KIDNEY DISEASE, WITHOUT LONG-TERM CURRENT USE OF INSULIN (HCC): Chronic | ICD-10-CM

## 2017-08-23 DIAGNOSIS — N18.30 TYPE 2 DIABETES MELLITUS WITH STAGE 3 CHRONIC KIDNEY DISEASE, WITHOUT LONG-TERM CURRENT USE OF INSULIN (HCC): Chronic | ICD-10-CM

## 2017-08-23 PROCEDURE — 99213 OFFICE O/P EST LOW 20 MIN: CPT | Performed by: INTERNAL MEDICINE

## 2017-08-23 NOTE — PROGRESS NOTES
"Subjective          History of Present Illness     Sy Florez is a 88 y.o. male with history of CKD, stage 3 who is here today requesting a referral to nephrology.  He was seen by a vascular surgeon at [facility] for his chronic claudication symptoms of the bilateral lower extremities.  He has both neurogenic and vascular claudication.  It has been difficult to clearly ascertain which of these causes the majority of his symptoms.  He was seen by a vascular surgeon at [facility] who offered medical intervention with a stenting or balloon angioplasty for his PAD.  The patient has stage III chronic kidney disease with baseline creatinine approximately 2.0.  He is aware of the complications that that will bring to any catheter-based intervention.  We will refer to [facility].      His mild/early type 2 diabetes mellitus continues to be diet controlled.  Most recent A1c was 6.0.     Review of Systems   Constitutional: Negative for chills, fatigue and fever.   HENT: Negative for congestion, ear pain, postnasal drip, sinus pressure and sore throat.    Respiratory: Negative for cough, shortness of breath and wheezing.    Cardiovascular: Negative for chest pain, palpitations and leg swelling.   Gastrointestinal: Negative for abdominal pain, blood in stool, constipation, diarrhea, nausea and vomiting.   Endocrine: Negative for cold intolerance, heat intolerance, polydipsia and polyuria.   Genitourinary: Negative for dysuria, frequency, hematuria and urgency.   Skin: Negative for rash.   Neurological: Negative for syncope and weakness.      PHQ-9 Depression Screening 8/23/2017   Little interest or pleasure in doing things 0   Feeling down, depressed, or hopeless 0   PHQ-9 Total Score 0       Objective     Vitals:    08/23/17 1356   BP: 120/70   Pulse: 56   Temp: 97.6 °F (36.4 °C)   TempSrc: Oral   Weight: 167 lb 3.2 oz (75.8 kg)   Height: 69\" (175.3 cm)     Physical Exam "   Constitutional: He is oriented to person, place, and time. He appears well-developed and well-nourished. No distress.   Neck: Neck supple. No JVD present. No thyromegaly present.   Cardiovascular: Normal rate, regular rhythm and normal heart sounds.    Pulmonary/Chest: Effort normal and breath sounds normal. No accessory muscle usage. No respiratory distress. He has no wheezes. He has no rales.   Chronic lung sounds.    Abdominal: Soft. Bowel sounds are normal. He exhibits no distension. There is no tenderness.   Musculoskeletal: He exhibits no edema.       Vascular Status -  His exam exhibits right foot edema (trace dependent peripheral edema   ). His exam exhibits left foot edema.  Lymphadenopathy:     He has no cervical adenopathy.   Neurological: He is alert and oriented to person, place, and time. No cranial nerve deficit.   Skin:   Sun damaged skin.    Psychiatric: He has a normal mood and affect. His speech is normal and behavior is normal.     Future Appointments  Date Time Provider Department Center   2/12/2018 2:00 PM John Rodriguez MD [facility] None         Assessment/Plan      Refer to [facility]rology for evaluation and clearance for contrast dye required for stenting or balloon angioplasty to lower extremity due to PAD.    Continue principles of diabetic diet and maintaining adequate activity levels.      He will return in February 2018 for next routine follow up visit with fasting labs one week prior.      Scribed for Dr. Rodriguez by Johanna Reyes [facility].     Diagnoses and all orders for this visit:    Chronic kidney disease, stage 3  -     Ambulatory Referral to Nephrology    Type 2 diabetes mellitus with stage 3 chronic kidney disease, without long-term current use of insulin    Peripheral vascular disease    Claudication      No visits with results within 3 Week(s) from this visit.  Latest known visit with results is:    Lab on 08/02/2017   Component Date Value Ref Range Status   • Glucose  08/02/2017 131* 70 - 100 mg/dL Final   • BUN 08/02/2017 41* 8 - 25 mg/dL Final   • Creatinine 08/02/2017 2.30* 0.40 - 1.30 mg/dL Final   • Sodium 08/02/2017 139  134 - 146 mmol/L Final   • Potassium 08/02/2017 4.3  3.4 - 5.4 mmol/L Final   • Chloride 08/02/2017 101  100 - 112 mmol/L Final   • CO2 08/02/2017 26.0  20.0 - 32.0 mmol/L Final   • Calcium 08/02/2017 9.4  8.4 - 10.8 mg/dL Final   • Total Protein 08/02/2017 7.1  6.7 - 8.2 g/dL Final   • Albumin 08/02/2017 4.10  3.20 - 5.50 g/dL Final   • ALT (SGPT) 08/02/2017 14  10 - 60 U/L Final   • AST (SGOT) 08/02/2017 21  10 - 60 U/L Final   • Alkaline Phosphatase 08/02/2017 121  15 - 121 U/L Final   • Total Bilirubin 08/02/2017 0.7  0.2 - 1.0 mg/dL Final   • eGFR Non African Amer 08/02/2017 27* 42 - 98 mL/min/1.73 Final   • Globulin 08/02/2017 3.0  2.5 - 4.6 gm/dL Final   • A/G Ratio 08/02/2017 1.4  1.0 - 3.0 g/dL Final   • BUN/Creatinine Ratio 08/02/2017 17.8  7.0 - 25.0 Final   • Anion Gap 08/02/2017 12.0  5.0 - 15.0 mmol/L Final   • WBC 08/02/2017 6.93  3.20 - 9.80 10*3/mm3 Final   • RBC 08/02/2017 3.90* 4.37 - 5.74 10*6/mm3 Final   • Hemoglobin 08/02/2017 11.0* 13.7 - 17.3 g/dL Final   • Hematocrit 08/02/2017 34.3* 39.0 - 49.0 % Final   • MCV 08/02/2017 87.9  80.0 - 98.0 fL Final   • MCH 08/02/2017 28.2  26.5 - 34.0 pg Final   • MCHC 08/02/2017 32.1  31.5 - 36.3 g/dL Final   • RDW 08/02/2017 14.5  11.5 - 14.5 % Final   • RDW-SD 08/02/2017 45.8* 35.1 - 43.9 fl Final   • MPV 08/02/2017 10.2  8.0 - 12.0 fL Final   • Platelets 08/02/2017 240  150 - 450 10*3/mm3 Final   • Neutrophil % 08/02/2017 59.9  37.0 - 80.0 % Final   • Lymphocyte % 08/02/2017 26.3  10.0 - 50.0 % Final   • Monocyte % 08/02/2017 8.2  0.0 - 12.0 % Final   • Eosinophil % 08/02/2017 4.6  0.0 - 7.0 % Final   • Basophil % 08/02/2017 1.0  0.0 - 2.0 % Final   • Neutrophils, Absolute 08/02/2017 4.15  2.00 - 8.60 10*3/mm3 Final   • Lymphocytes, Absolute 08/02/2017 1.82  0.60 - 4.20 10*3/mm3 Final   •  Monocytes, Absolute 08/02/2017 0.57  0.00 - 0.90 10*3/mm3 Final   • Eosinophils, Absolute 08/02/2017 0.32  0.00 - 0.70 10*3/mm3 Final   • Basophils, Absolute 08/02/2017 0.07  0.00 - 0.20 10*3/mm3 Final   • Vitamin B-12 08/02/2017 465  239 - 931 pg/mL Final   • Ferritin 08/02/2017 20.70  17.90 - 464.00 ng/mL Final   • Hemoglobin A1C 08/02/2017 6.0* 4 - 5.6 % Final   • LDL Cholesterol  08/02/2017 92  0 - 129 mg/dL Final   • TSH 08/02/2017 1.800  0.460 - 4.680 mIU/mL Final   • PSA 08/02/2017 0.154  0.000 - 4.000 ng/mL Final   • Triglycerides 08/02/2017 280* 35 - 160 mg/dL Final   ]

## 2017-09-19 RX ORDER — RAMIPRIL 5 MG/1
CAPSULE ORAL
Qty: 30 CAPSULE | Refills: 5 | Status: SHIPPED | OUTPATIENT
Start: 2017-09-19 | End: 2018-03-26 | Stop reason: SDUPTHER

## 2017-11-27 RX ORDER — AMLODIPINE BESYLATE 5 MG/1
TABLET ORAL
Qty: 30 TABLET | Refills: 11 | Status: SHIPPED | OUTPATIENT
Start: 2017-11-27 | End: 2018-01-04 | Stop reason: SDUPTHER

## 2017-12-01 ENCOUNTER — OFFICE VISIT (OUTPATIENT)
Dept: FAMILY MEDICINE CLINIC | Facility: CLINIC | Age: 82
End: 2017-12-01

## 2017-12-01 VITALS
BODY MASS INDEX: 24.29 KG/M2 | SYSTOLIC BLOOD PRESSURE: 110 MMHG | WEIGHT: 164 LBS | HEIGHT: 69 IN | HEART RATE: 68 BPM | TEMPERATURE: 97.7 F | DIASTOLIC BLOOD PRESSURE: 70 MMHG

## 2017-12-01 DIAGNOSIS — D50.9 IRON DEFICIENCY ANEMIA, UNSPECIFIED IRON DEFICIENCY ANEMIA TYPE: Primary | ICD-10-CM

## 2017-12-01 DIAGNOSIS — I73.9 PERIPHERAL VASCULAR DISEASE (HCC): Chronic | ICD-10-CM

## 2017-12-01 DIAGNOSIS — N18.30 CHRONIC KIDNEY DISEASE, STAGE 3 (HCC): Chronic | ICD-10-CM

## 2017-12-01 DIAGNOSIS — I73.9 CLAUDICATION (HCC): Chronic | ICD-10-CM

## 2017-12-01 DIAGNOSIS — M53.86 DISORDER OF LUMBAR SPINE: ICD-10-CM

## 2017-12-01 PROCEDURE — 99214 OFFICE O/P EST MOD 30 MIN: CPT | Performed by: INTERNAL MEDICINE

## 2017-12-01 RX ORDER — PREDNISONE 1 MG/1
1 TABLET ORAL DAILY
COMMUNITY
Start: 2017-10-23 | End: 2018-05-10 | Stop reason: SDUPTHER

## 2017-12-01 NOTE — PROGRESS NOTES
"Subjective        History of Present Illness     Sy Florez is a 88 y.o. male with medical issues including CKD, CAD, PVD, PAD, hypertension, high cholesterol, and diet-controlled type 2 diabetes.  He also has iron deficiency anemia and presents requesting a referral for IV iron infusions.       He underwent recent left lower extremity bypass x 2 below the left knee of the left lower extremity by vascular surgeon at [facility] for his PAD.  Attempts at angioplasty were unsuccessful.  Labs obtained last month after completing the procedure revealed hemoglobin  9.8 and serum iron 16.  TIBC was elevated at 454.  Iron infusions were recommended by the vascular surgeon and the nephrology service, as he has been  intolerant with oral iron due to GI symptoms. Creatinine was 2.8.  He is reporting fatigue and dyspnea on exertion, worse than baseline.  He denies any angina.    He is pleased that his left lower extremity pain has improved.  The patient has both neurogenic and vascular claudication, and it is difficult to distinguish one from the other in this patient.      Review of Systems   Constitutional: Positive for fatigue. Negative for chills and fever.   HENT: Negative for congestion, ear pain, postnasal drip, sinus pressure and sore throat.    Respiratory: Negative for cough, shortness of breath and wheezing.    Cardiovascular: Negative for chest pain, palpitations and leg swelling.   Gastrointestinal: Negative for abdominal pain, blood in stool, constipation, diarrhea, nausea and vomiting.   Endocrine: Negative for cold intolerance, heat intolerance, polydipsia and polyuria.   Genitourinary: Negative for dysuria, frequency, hematuria and urgency.   Skin: Negative for rash.   Neurological: Negative for syncope and weakness.        Objective     Vitals:    12/01/17 1434   BP: 110/70   Pulse: 68   Temp: 97.7 °F (36.5 °C)   TempSrc: Oral   Weight: 164 lb (74.4 kg)   Height: 69\" (175.3 cm) "     Physical Exam   Constitutional: He is oriented to person, place, and time. He appears well-developed and well-nourished. No distress.   HENT:   Head: Normocephalic and atraumatic.   Nose: Nose normal.   Mouth/Throat: Oropharynx is clear and moist. No oropharyngeal exudate.   Eyes: EOM are normal. Pupils are equal, round, and reactive to light.   Neck: Neck supple. No JVD present. No thyromegaly present.   Cardiovascular: Normal rate, regular rhythm and normal heart sounds.    Pulmonary/Chest: Effort normal and breath sounds normal. No accessory muscle usage. No respiratory distress. He has no wheezes. He has no rales.   Chronic lung sounds.     Abdominal: Soft. Bowel sounds are normal. He exhibits no distension. There is no tenderness.   Musculoskeletal: He exhibits no edema.       Vascular Status -  His exam exhibits right foot vasculature abnormal (Pulse is absent on left with faint pulse on the right posterior tibialis. ). His exam exhibits left foot vasculature abnormal.  Lymphadenopathy:     He has no cervical adenopathy.   Neurological: He is alert and oriented to person, place, and time. No cranial nerve deficit.   Skin:   Sun damaged skin.  Surgical incisions on the left lower extremity are healing nicely   Psychiatric: He has a normal mood and affect. His speech is normal and behavior is normal. Judgment and thought content normal.       Future Appointments  Date Time Provider Department Center   12/20/2017 3:45 PM Patricio Mccord MD [facility]   12/20/2017 4:15 PM NURSE [facility]   2/12/2018 2:00 PM John Rodriguez MD [facility] None       Assessment/Plan      We will refer to Dr. Patricio Mccord/hematology/oncology for evaluation of iron deficiency anemia and IV iron infusion.  He has been intolerant of oral iron replacement in the past due to GI intolerance.    He is given a refill on Bactroban topical.      Continue to avoid NSAIDs and other nephrotoxic drugs.  Continue the current dose of  Neurontin.    Continue other medications and vitamin and mineral supplements to treat additional medical problems which we addressed today.  He will return in February for routine follow up with fasting labs one week prior.        Scribed for Dr. Rodriguez by Johanna Reyes [facility].     Diagnoses and all orders for this visit:    Iron deficiency anemia, unspecified iron deficiency anemia type  -     Ambulatory Referral to Hematology / Oncology    Claudication    Chronic kidney disease, stage 3    Peripheral vascular disease    Other orders  -     predniSONE (DELTASONE) 5 MG tablet; Take 1 tablet by mouth Daily.  -     mupirocin (BACTROBAN) 2 % ointment; Apply  topically 2 (Two) Times a Day.      No visits with results within 3 Week(s) from this visit.  Latest known visit with results is:    Lab on 08/02/2017   Component Date Value Ref Range Status   • Glucose 08/02/2017 131* 70 - 100 mg/dL Final   • BUN 08/02/2017 41* 8 - 25 mg/dL Final   • Creatinine 08/02/2017 2.30* 0.40 - 1.30 mg/dL Final   • Sodium 08/02/2017 139  134 - 146 mmol/L Final   • Potassium 08/02/2017 4.3  3.4 - 5.4 mmol/L Final   • Chloride 08/02/2017 101  100 - 112 mmol/L Final   • CO2 08/02/2017 26.0  20.0 - 32.0 mmol/L Final   • Calcium 08/02/2017 9.4  8.4 - 10.8 mg/dL Final   • Total Protein 08/02/2017 7.1  6.7 - 8.2 g/dL Final   • Albumin 08/02/2017 4.10  3.20 - 5.50 g/dL Final   • ALT (SGPT) 08/02/2017 14  10 - 60 U/L Final   • AST (SGOT) 08/02/2017 21  10 - 60 U/L Final   • Alkaline Phosphatase 08/02/2017 121  15 - 121 U/L Final   • Total Bilirubin 08/02/2017 0.7  0.2 - 1.0 mg/dL Final   • eGFR Non African Amer 08/02/2017 27* 42 - 98 mL/min/1.73 Final   • Globulin 08/02/2017 3.0  2.5 - 4.6 gm/dL Final   • A/G Ratio 08/02/2017 1.4  1.0 - 3.0 g/dL Final   • BUN/Creatinine Ratio 08/02/2017 17.8  7.0 - 25.0 Final   • Anion Gap 08/02/2017 12.0  5.0 - 15.0 mmol/L Final   • WBC 08/02/2017 6.93  3.20 - 9.80 10*3/mm3 Final   • RBC 08/02/2017 3.90* 4.37 -  5.74 10*6/mm3 Final   • Hemoglobin 08/02/2017 11.0* 13.7 - 17.3 g/dL Final   • Hematocrit 08/02/2017 34.3* 39.0 - 49.0 % Final   • MCV 08/02/2017 87.9  80.0 - 98.0 fL Final   • MCH 08/02/2017 28.2  26.5 - 34.0 pg Final   • MCHC 08/02/2017 32.1  31.5 - 36.3 g/dL Final   • RDW 08/02/2017 14.5  11.5 - 14.5 % Final   • RDW-SD 08/02/2017 45.8* 35.1 - 43.9 fl Final   • MPV 08/02/2017 10.2  8.0 - 12.0 fL Final   • Platelets 08/02/2017 240  150 - 450 10*3/mm3 Final   • Neutrophil % 08/02/2017 59.9  37.0 - 80.0 % Final   • Lymphocyte % 08/02/2017 26.3  10.0 - 50.0 % Final   • Monocyte % 08/02/2017 8.2  0.0 - 12.0 % Final   • Eosinophil % 08/02/2017 4.6  0.0 - 7.0 % Final   • Basophil % 08/02/2017 1.0  0.0 - 2.0 % Final   • Neutrophils, Absolute 08/02/2017 4.15  2.00 - 8.60 10*3/mm3 Final   • Lymphocytes, Absolute 08/02/2017 1.82  0.60 - 4.20 10*3/mm3 Final   • Monocytes, Absolute 08/02/2017 0.57  0.00 - 0.90 10*3/mm3 Final   • Eosinophils, Absolute 08/02/2017 0.32  0.00 - 0.70 10*3/mm3 Final   • Basophils, Absolute 08/02/2017 0.07  0.00 - 0.20 10*3/mm3 Final   • Vitamin B-12 08/02/2017 465  239 - 931 pg/mL Final   • Ferritin 08/02/2017 20.70  17.90 - 464.00 ng/mL Final   • Hemoglobin A1C 08/02/2017 6.0* 4 - 5.6 % Final   • LDL Cholesterol  08/02/2017 92  0 - 129 mg/dL Final   • TSH 08/02/2017 1.800  0.460 - 4.680 mIU/mL Final   • PSA 08/02/2017 0.154  0.000 - 4.000 ng/mL Final   • Triglycerides 08/02/2017 280* 35 - 160 mg/dL Final   ]

## 2017-12-18 RX ORDER — FUROSEMIDE 40 MG/1
TABLET ORAL
Qty: 30 TABLET | Refills: 11 | Status: SHIPPED | OUTPATIENT
Start: 2017-12-18 | End: 2018-12-04 | Stop reason: SDUPTHER

## 2017-12-20 ENCOUNTER — CONSULT (OUTPATIENT)
Dept: ONCOLOGY | Facility: CLINIC | Age: 82
End: 2017-12-20

## 2017-12-20 ENCOUNTER — LAB (OUTPATIENT)
Dept: ONCOLOGY | Facility: HOSPITAL | Age: 82
End: 2017-12-20

## 2017-12-20 VITALS
HEART RATE: 76 BPM | TEMPERATURE: 98.3 F | HEIGHT: 69 IN | BODY MASS INDEX: 25.11 KG/M2 | DIASTOLIC BLOOD PRESSURE: 59 MMHG | WEIGHT: 169.53 LBS | SYSTOLIC BLOOD PRESSURE: 114 MMHG | RESPIRATION RATE: 16 BRPM

## 2017-12-20 DIAGNOSIS — C61 MALIGNANT TUMOR OF PROSTATE (HCC): ICD-10-CM

## 2017-12-20 DIAGNOSIS — D64.9 ANEMIA, UNSPECIFIED TYPE: Chronic | ICD-10-CM

## 2017-12-20 DIAGNOSIS — D64.9 ANEMIA, UNSPECIFIED TYPE: Primary | Chronic | ICD-10-CM

## 2017-12-20 DIAGNOSIS — IMO0001 IRON AND ITS COMPOUNDS CAUSING ADVERSE EFFECT IN THERAPEUTIC USE, INITIAL ENCOUNTER: ICD-10-CM

## 2017-12-20 PROBLEM — T45.4X5A ADVERSE EFFECT OF IRON OR ITS COMPOUND: Status: ACTIVE | Noted: 2017-12-20

## 2017-12-20 LAB
ALBUMIN SERPL-MCNC: 4.3 G/DL (ref 3.4–4.8)
ALBUMIN/GLOB SERPL: 1.5 G/DL (ref 1.1–1.8)
ALP SERPL-CCNC: 115 U/L (ref 38–126)
ALT SERPL W P-5'-P-CCNC: 29 U/L (ref 21–72)
ANION GAP SERPL CALCULATED.3IONS-SCNC: 11 MMOL/L (ref 5–15)
AST SERPL-CCNC: 29 U/L (ref 17–59)
BASOPHILS # BLD AUTO: 0.03 10*3/MM3 (ref 0–0.2)
BASOPHILS NFR BLD AUTO: 0.4 % (ref 0–2)
BILIRUB SERPL-MCNC: 0.3 MG/DL (ref 0.2–1.3)
BUN BLD-MCNC: 50 MG/DL (ref 7–21)
BUN/CREAT SERPL: 21.1 (ref 7–25)
CALCIUM SPEC-SCNC: 9.2 MG/DL (ref 8.4–10.2)
CHLORIDE SERPL-SCNC: 105 MMOL/L (ref 95–110)
CO2 SERPL-SCNC: 23 MMOL/L (ref 22–31)
CREAT BLD-MCNC: 2.37 MG/DL (ref 0.7–1.3)
DEPRECATED RDW RBC AUTO: 43.8 FL (ref 35.1–43.9)
EOSINOPHIL # BLD AUTO: 0.06 10*3/MM3 (ref 0–0.7)
EOSINOPHIL NFR BLD AUTO: 0.7 % (ref 0–7)
ERYTHROCYTE [DISTWIDTH] IN BLOOD BY AUTOMATED COUNT: 14.9 % (ref 11.5–14.5)
FERRITIN SERPL-MCNC: 12.2 NG/ML (ref 17.9–464)
FOLATE SERPL-MCNC: 11.7 NG/ML (ref 2.76–21)
GFR SERPL CREATININE-BSD FRML MDRD: 26 ML/MIN/1.73 (ref 42–98)
GLOBULIN UR ELPH-MCNC: 2.9 GM/DL (ref 2.3–3.5)
GLUCOSE BLD-MCNC: 139 MG/DL (ref 60–100)
HCT VFR BLD AUTO: 25.9 % (ref 39–49)
HGB BLD-MCNC: 8.1 G/DL (ref 13.7–17.3)
IMM GRANULOCYTES # BLD: 0.05 10*3/MM3 (ref 0–0.02)
IMM GRANULOCYTES NFR BLD: 0.6 % (ref 0–0.5)
IRON 24H UR-MRATE: 13 MCG/DL (ref 49–181)
IRON SATN MFR SERPL: 3 % (ref 20–55)
LYMPHOCYTES # BLD AUTO: 1.06 10*3/MM3 (ref 0.6–4.2)
LYMPHOCYTES NFR BLD AUTO: 12.8 % (ref 10–50)
MCH RBC QN AUTO: 25.2 PG (ref 26.5–34)
MCHC RBC AUTO-ENTMCNC: 31.3 G/DL (ref 31.5–36.3)
MCV RBC AUTO: 80.4 FL (ref 80–98)
MONOCYTES # BLD AUTO: 0.34 10*3/MM3 (ref 0–0.9)
MONOCYTES NFR BLD AUTO: 4.1 % (ref 0–12)
NEUTROPHILS # BLD AUTO: 6.72 10*3/MM3 (ref 2–8.6)
NEUTROPHILS NFR BLD AUTO: 81.4 % (ref 37–80)
PLATELET # BLD AUTO: 260 10*3/MM3 (ref 150–450)
PMV BLD AUTO: 9.9 FL (ref 8–12)
POTASSIUM BLD-SCNC: 4.5 MMOL/L (ref 3.5–5.1)
PROT SERPL-MCNC: 7.2 G/DL (ref 6.3–8.6)
RBC # BLD AUTO: 3.22 10*6/MM3 (ref 4.37–5.74)
SODIUM BLD-SCNC: 139 MMOL/L (ref 137–145)
TIBC SERPL-MCNC: 440 MCG/DL (ref 261–462)
VIT B12 BLD-MCNC: 657 PG/ML (ref 239–931)
WBC NRBC COR # BLD: 8.26 10*3/MM3 (ref 3.2–9.8)

## 2017-12-20 PROCEDURE — 83540 ASSAY OF IRON: CPT | Performed by: INTERNAL MEDICINE

## 2017-12-20 PROCEDURE — 82607 VITAMIN B-12: CPT | Performed by: INTERNAL MEDICINE

## 2017-12-20 PROCEDURE — 82746 ASSAY OF FOLIC ACID SERUM: CPT | Performed by: INTERNAL MEDICINE

## 2017-12-20 PROCEDURE — 82728 ASSAY OF FERRITIN: CPT | Performed by: INTERNAL MEDICINE

## 2017-12-20 PROCEDURE — G0463 HOSPITAL OUTPT CLINIC VISIT: HCPCS | Performed by: INTERNAL MEDICINE

## 2017-12-20 PROCEDURE — 83550 IRON BINDING TEST: CPT | Performed by: INTERNAL MEDICINE

## 2017-12-20 PROCEDURE — 80053 COMPREHEN METABOLIC PANEL: CPT | Performed by: INTERNAL MEDICINE

## 2017-12-20 PROCEDURE — 85025 COMPLETE CBC W/AUTO DIFF WBC: CPT | Performed by: INTERNAL MEDICINE

## 2017-12-20 PROCEDURE — 99204 OFFICE O/P NEW MOD 45 MIN: CPT | Performed by: INTERNAL MEDICINE

## 2017-12-20 RX ORDER — SODIUM CHLORIDE 9 MG/ML
250 INJECTION, SOLUTION INTRAVENOUS ONCE
Status: CANCELLED | OUTPATIENT
Start: 2017-12-28

## 2017-12-20 RX ORDER — DIPHENHYDRAMINE HCL 25 MG
25 CAPSULE ORAL ONCE
Status: CANCELLED | OUTPATIENT
Start: 2017-12-28

## 2017-12-20 NOTE — PROGRESS NOTES
DATE OF CONSULT: 12/20/2017    REQUESTING SOURCE: John Rodriguez MD      REASON FOR CONSULTATION:  Anemia, history of prostate cancer    HISTORY OF PRESENT ILLNESS:    88-year-old male with a past medical history significant for history of peripheral vascular disease status post femoropopliteal bypass, recently had a vascular intervention done in left lower extremity secondary to blockage at [facility] in November 2017. Patient also has a history of chronic kidney disease stage III as well as history of prostate cancer diagnosed in 2001 status post seed implant.  On most recent admission to [facility] in November 2017 patient was found to have worsening anemia with hemoglobin of 9.8 and anemia workup showing iron deficiency with ferritin of 30 and 9 saturation of 4%.  Patient is not able to tolerate oral iron secondary to severe stomach upset and constipation.  He has been referred to [facility] for evaluation and recommendation regarding his iron deficiency anemia  Denies any blood in stool or urine.  States he had upper endoscopy and colonoscopy done in 2015 and did not show any bleeding source.  Denies any major weight loss.  Complains of shortness of breath with exertion.  Denies any new headache or dizziness.    PAST MEDICAL HISTORY:    Past Medical History:   Diagnosis Date   • Actinic keratosis    • Acute prostatitis     h/o 2014    • Anemia      New problem noted with preoperative labs, 10/2015.. Anemia workup initiated, 10/2015      • Arthritis      Possible inflammatory arthritis bilateral knees   • Bursitis of hip     right greater trochanteric bursitis, spring 2014   • Cellulitis of lower limb     mild bilateral lower extremities   • Chronic kidney disease, stage 3     - cr =1.8-2.0, gradually worsening   • Claudication     dr. you s/p numerous revascularization. medical management   • Coronary arteriosclerosis     dr. perez   • Degenerative joint disease involving multiple joints     • Disorder of lumbar spine     [facility]      • Dyslipidemia     Resumed statin therapy 1/2015. Lipitor   • Edema of lower extremity      Etiology undetermined. Chronic venous insufficiency is playing a role   • Essential hypertension    • Eustachian tube disorder    • Gastroesophageal reflux disease    • Hyperlipidemia     Resumed extended interval Lipitor, 7/2015   • Idiopathic peripheral neuropathy     Gradually increasing Neurontin. Referred to Dr. Vergara, neurologist, 4/2015      • IFG (impaired fasting glucose)    • Leg cramp     nocturnal leg cramps   • Malaise and fatigue    • Malignant tumor of prostate     6/2001 s/p brachytherapy   • Peripheral vascular disease     PAD/claudication. Dr. Brennan      • Peripheral venous insufficiency     Chronic venous insufficiency bilateral lower extremities, left worse than right      • Pneumonia     H/O   • Pruritic rash     Autoimmune. Responds to methotrexate. Dr. Oliveros, dermatology. The patient stopped the methotrexate and declines resumption. Started low-dose prednisone, 4/2016      • Type 2 diabetes mellitus     Newly diagnosed, 8/2015. Started Amaryl, 8/2015. Metformin and pioglitizone contraindicated   • Type 2 diabetes mellitus with chronic kidney disease, without long-term current use of insulin     Newly diagnosed, 8/2015. Started Amaryl, 8/2015. Metformin and pioglitizone contraindicated   • UTI (urinary tract infection)     H/O,site not specified-possible       PAST SURGICAL HISTORY:  Past Surgical History:   Procedure Laterality Date   • CORONARY ARTERY BYPASS GRAFT      vein,two  09/2006   • ENDOSCOPY AND COLONOSCOPY      7/2011 declines   • OTHER SURGICAL HISTORY      femoral-popliteal bypass graft - right leg 1997/left leg 2007   • OTHER SURGICAL HISTORY      laser surgery of prostate, - radiation implant;  Last Performed: 06/2001   • OTHER SURGICAL HISTORY      low back disk surgery , - Lumbar laminectomy with  L4/L5 mechanical fusion;  Last Performed: 11/2015   • OTHER SURGICAL HISTORY      remove impacted cerumen 4/06/16       ALLERGIES:    Allergies   Allergen Reactions   • Bacitracin    • Formaldehyde    • Framycetin      All Fragrances   • Neomycin    • Nickel        SOCIAL HISTORY:   Social History   Substance Use Topics   • Smoking status: Former Smoker   • Smokeless tobacco: Never Used   • Alcohol use No       CURRENT MEDICATIONS:    Current Outpatient Prescriptions   Medication Sig Dispense Refill   • amLODIPine (NORVASC) 5 MG tablet TAKE 1 TABLET BY MOUTH DAILY. 30 tablet 11   • aspirin 81 MG tablet Take 81 mg by mouth daily.     • furosemide (LASIX) 40 MG tablet TAKE 1 TABLET BY MOUTH EVERY MORNING. 30 tablet 11   • gabapentin (NEURONTIN) 600 MG tablet Take one tab twice daily 60 tablet 5   • mupirocin (BACTROBAN) 2 % ointment Apply  topically 2 (Two) Times a Day. 22 g 11   • omeprazole (priLOSEC) 40 MG capsule Take 1 capsule by mouth Every Morning. 30 capsule 11   • predniSONE (DELTASONE) 5 MG tablet Take 1 tablet by mouth Daily.     • ramipril (ALTACE) 5 MG capsule Take one tab daily 30 capsule 5     No current facility-administered medications for this visit.         HOME MEDICATIONS:   Current Outpatient Prescriptions on File Prior to Visit   Medication Sig Dispense Refill   • amLODIPine (NORVASC) 5 MG tablet TAKE 1 TABLET BY MOUTH DAILY. 30 tablet 11   • aspirin 81 MG tablet Take 81 mg by mouth daily.     • furosemide (LASIX) 40 MG tablet TAKE 1 TABLET BY MOUTH EVERY MORNING. 30 tablet 11   • gabapentin (NEURONTIN) 600 MG tablet Take one tab twice daily 60 tablet 5   • mupirocin (BACTROBAN) 2 % ointment Apply  topically 2 (Two) Times a Day. 22 g 11   • omeprazole (priLOSEC) 40 MG capsule Take 1 capsule by mouth Every Morning. 30 capsule 11   • predniSONE (DELTASONE) 5 MG tablet Take 1 tablet by mouth Daily.     • ramipril (ALTACE) 5 MG capsule Take one tab daily 30 capsule 5     No current  facility-administered medications on file prior to visit.        FAMILY HISTORY:    Family History   Problem Relation Age of Onset   • Diabetes Mother    • Diabetes Father    • Diabetes Brother            REVIEW OF SYSTEMS:      CONSTITUTIONAL:  Complains of fatigue. Denies any fever, chills or weight loss.     HEENT:  No epistaxis, mouth sores or difficulty swallowing.    RESPIRATORY:  Complains of shortness of breath with exertion. No new cough or hemoptysis.    CARDIOVASCULAR:  No chest pain or palpitations.    GASTROINTESTINAL:  No abdominal pain nausea, vomiting or blood in the stool.    GENITOURINARY: No Dysuria or Hematuria.    MUSCULOSKELETAL:Positive for chronic arthritic pain affecting bilateral hand,leg and back.    LYMPHATICS:  Denies any abnormal swollen glands anywhere in the body.    NEUROLOGICAL : Complains of tingling and numbness in right foot secondary to poor circulation. No headache or dizziness. No seizures or balance problems.    SKIN: No new skin lesions.        PHYSICAL EXAMINATION:      VITAL SIGNS:  Temp:  [98.3 °F (36.8 °C)] 98.3 °F (36.8 °C)  Heart Rate:  [76] 76  Resp:  [16] 16  BP: (114)/(59) 114/59    GENERAL:  Not in any distress.    HEENT:  Normocephalic, Atraumatic.Eyes  Shows mild pallor. No icterus. Extraocular Movements Intact. No Facial Asymmetry noted.    NECK:  No adenopathy. NO JVD.    RESPIRATORY:  Fair air entry bilateral. No rhonchi or wheezing.    CARDIOVASCULAR:  S1, S2. Regular rate and rhythm. No murmur or gallop appreciated.    ABDOMEN:  Soft, obese, nontender. Bowel sounds present in all four quadrants.  No organomegaly appreciated.    EXTREMITIES:  Trace edema.No Calf Tenderness.    NEUROLOGIC:  Alert, awake and oriented ×3.  No  Motor or sensory deficit appreciated. Cranial Nerves 2-12 grossly intact.          DIAGNOSTIC DATA:    WBC   Date Value Ref Range Status   08/02/2017 6.93 3.20 - 9.80 10*3/mm3 Final     RBC   Date Value Ref Range Status   08/02/2017 3.90  (L) 4.37 - 5.74 10*6/mm3 Final     Hemoglobin   Date Value Ref Range Status   08/02/2017 11.0 (L) 13.7 - 17.3 g/dL Final     Hematocrit   Date Value Ref Range Status   08/02/2017 34.3 (L) 39.0 - 49.0 % Final     MCV   Date Value Ref Range Status   08/02/2017 87.9 80.0 - 98.0 fL Final     MCH   Date Value Ref Range Status   08/02/2017 28.2 26.5 - 34.0 pg Final     MCHC   Date Value Ref Range Status   08/02/2017 32.1 31.5 - 36.3 g/dL Final     RDW   Date Value Ref Range Status   08/02/2017 14.5 11.5 - 14.5 % Final     RDW-SD   Date Value Ref Range Status   08/02/2017 45.8 (H) 35.1 - 43.9 fl Final     MPV   Date Value Ref Range Status   08/02/2017 10.2 8.0 - 12.0 fL Final     Platelets   Date Value Ref Range Status   08/02/2017 240 150 - 450 10*3/mm3 Final     Neutrophil %   Date Value Ref Range Status   08/02/2017 59.9 37.0 - 80.0 % Final     Lymphocyte %   Date Value Ref Range Status   08/02/2017 26.3 10.0 - 50.0 % Final     Monocyte %   Date Value Ref Range Status   08/02/2017 8.2 0.0 - 12.0 % Final     Eosinophil %   Date Value Ref Range Status   08/02/2017 4.6 0.0 - 7.0 % Final     Basophil %   Date Value Ref Range Status   08/02/2017 1.0 0.0 - 2.0 % Final     Neutrophils, Absolute   Date Value Ref Range Status   08/02/2017 4.15 2.00 - 8.60 10*3/mm3 Final     Lymphocytes, Absolute   Date Value Ref Range Status   08/02/2017 1.82 0.60 - 4.20 10*3/mm3 Final     Monocytes, Absolute   Date Value Ref Range Status   08/02/2017 0.57 0.00 - 0.90 10*3/mm3 Final     Eosinophils, Absolute   Date Value Ref Range Status   08/02/2017 0.32 0.00 - 0.70 10*3/mm3 Final     Basophils, Absolute   Date Value Ref Range Status   08/02/2017 0.07 0.00 - 0.20 10*3/mm3 Final     nRBC   Date Value Ref Range Status   07/27/2016 0  Final   07/27/2016 0  Final     Glucose   Date Value Ref Range Status   08/02/2017 131 (H) 70 - 100 mg/dL Final     Sodium   Date Value Ref Range Status   08/02/2017 139 134 - 146 mmol/L Final     Potassium   Date  Value Ref Range Status   08/02/2017 4.3 3.4 - 5.4 mmol/L Final     CO2   Date Value Ref Range Status   08/02/2017 26.0 20.0 - 32.0 mmol/L Final     Chloride   Date Value Ref Range Status   08/02/2017 101 100 - 112 mmol/L Final     Anion Gap   Date Value Ref Range Status   08/02/2017 12.0 5.0 - 15.0 mmol/L Final     Creatinine   Date Value Ref Range Status   08/02/2017 2.30 (H) 0.40 - 1.30 mg/dL Final     BUN   Date Value Ref Range Status   08/02/2017 41 (H) 8 - 25 mg/dL Final     BUN/Creatinine Ratio   Date Value Ref Range Status   08/02/2017 17.8 7.0 - 25.0 Final     Calcium   Date Value Ref Range Status   08/02/2017 9.4 8.4 - 10.8 mg/dL Final     eGFR Non  Amer   Date Value Ref Range Status   08/02/2017 27 (L) 42 - 98 mL/min/1.73 Final     Alkaline Phosphatase   Date Value Ref Range Status   08/02/2017 121 15 - 121 U/L Final     Total Protein   Date Value Ref Range Status   08/02/2017 7.1 6.7 - 8.2 g/dL Final     ALT (SGPT)   Date Value Ref Range Status   08/02/2017 14 10 - 60 U/L Final     AST (SGOT)   Date Value Ref Range Status   08/02/2017 21 10 - 60 U/L Final     Total Bilirubin   Date Value Ref Range Status   08/02/2017 0.7 0.2 - 1.0 mg/dL Final     Albumin   Date Value Ref Range Status   08/02/2017 4.10 3.20 - 5.50 g/dL Final     Globulin   Date Value Ref Range Status   08/02/2017 3.0 2.5 - 4.6 gm/dL Final     A/G Ratio   Date Value Ref Range Status   08/02/2017 1.4 1.0 - 3.0 g/dL Final     Lab Results   Component Value Date    FERRITIN 20.70 08/02/2017    [id number] 465 08/02/2017    FOLATE 13.50 10/20/2015       Blood work that patient has brought from [facility] shows ferritin is 30, iron saturation is only 4% and hemoglobin is 9.8.            ASSESSMENT AND PLAN:      1.  Chronic normocytic anemia: Most likely iron deficiency secondary to iron saturation of 4%.  Patient also has a contributing factor from chronic kidney disease plus history of severe arthritis and poor circulation.  Patient  states he has tried oral iron in the past without any success.  Oral iron gives him excessive stomach upset and constipation.  We will recheck his CBC today along with iron studies and CMP.  If he does have a component of iron deficiency will start him on intravenous Feraheme next week.  Side effect of intravenous Feraheme including allergic reaction were discussed with patient.  We will continue Benadryl and Pepcid prior to Feraheme.  We will see him back in about 2 months with a repeat anemia workup to be done prior to that.    2.  Chronic kidney disease stage III    3.  History of prostate cancer status post seed implant.    4.  History of peripheral vascular disease status post femoropopliteal bypass.  Had intervention done recently at [facility] in left lower extremity.    5.  Arthritis    6. Health maintenance: Patient quit smoking 35 years ago.  Had a colonoscopy and EGD in 2015 in [place].  Has an advanced directive.        Thank you for this consultation.        Patricio Mccord MD  12/20/2017  4:00 PM          EMR Dragon/Transcription disclaimer:   Much of this encounter note is an electronic transcription/translation of spoken language to printed text. The electronic translation of spoken language may permit erroneous, or at times, nonsensical words or phrases to be inadvertently transcribed; Although I have reviewed the note for such errors, some may still exist.

## 2017-12-21 ENCOUNTER — TELEPHONE (OUTPATIENT)
Dept: ONCOLOGY | Facility: HOSPITAL | Age: 82
End: 2017-12-21

## 2017-12-21 NOTE — TELEPHONE ENCOUNTER
----- Message from Patricio Mccord MD sent at 12/20/2017  6:23 PM CST -----  I have placed order for intravenous Feraheme.  Please let patient know.  Thank you

## 2017-12-28 ENCOUNTER — APPOINTMENT (OUTPATIENT)
Dept: ONCOLOGY | Facility: HOSPITAL | Age: 82
End: 2017-12-28

## 2018-01-04 ENCOUNTER — INFUSION (OUTPATIENT)
Dept: ONCOLOGY | Facility: HOSPITAL | Age: 83
End: 2018-01-04

## 2018-01-04 VITALS
SYSTOLIC BLOOD PRESSURE: 132 MMHG | RESPIRATION RATE: 16 BRPM | DIASTOLIC BLOOD PRESSURE: 56 MMHG | TEMPERATURE: 98.1 F | HEART RATE: 55 BPM

## 2018-01-04 DIAGNOSIS — D50.9 IRON DEFICIENCY ANEMIA, UNSPECIFIED IRON DEFICIENCY ANEMIA TYPE: ICD-10-CM

## 2018-01-04 DIAGNOSIS — IMO0001 IRON AND ITS COMPOUNDS CAUSING ADVERSE EFFECT IN THERAPEUTIC USE, INITIAL ENCOUNTER: Primary | ICD-10-CM

## 2018-01-04 PROCEDURE — 96374 THER/PROPH/DIAG INJ IV PUSH: CPT | Performed by: INTERNAL MEDICINE

## 2018-01-04 PROCEDURE — 63710000001 DIPHENHYDRAMINE PER 50 MG: Performed by: INTERNAL MEDICINE

## 2018-01-04 PROCEDURE — 25010000002 FERUMOXYTOL 510 MG/17ML SOLUTION 510 MG VIAL: Performed by: INTERNAL MEDICINE

## 2018-01-04 PROCEDURE — 96375 TX/PRO/DX INJ NEW DRUG ADDON: CPT | Performed by: INTERNAL MEDICINE

## 2018-01-04 RX ORDER — DIPHENHYDRAMINE HCL 25 MG
25 CAPSULE ORAL ONCE
Status: CANCELLED | OUTPATIENT
Start: 2018-01-11

## 2018-01-04 RX ORDER — SODIUM CHLORIDE 9 MG/ML
250 INJECTION, SOLUTION INTRAVENOUS ONCE
Status: CANCELLED | OUTPATIENT
Start: 2018-01-11

## 2018-01-04 RX ORDER — FAMOTIDINE 10 MG/ML
20 INJECTION, SOLUTION INTRAVENOUS ONCE
Status: COMPLETED | OUTPATIENT
Start: 2018-01-04 | End: 2018-01-04

## 2018-01-04 RX ORDER — SODIUM CHLORIDE 9 MG/ML
250 INJECTION, SOLUTION INTRAVENOUS ONCE
Status: COMPLETED | OUTPATIENT
Start: 2018-01-04 | End: 2018-01-04

## 2018-01-04 RX ORDER — FAMOTIDINE 10 MG/ML
20 INJECTION, SOLUTION INTRAVENOUS ONCE
Status: CANCELLED | OUTPATIENT
Start: 2018-01-11

## 2018-01-04 RX ORDER — AMLODIPINE BESYLATE 5 MG/1
TABLET ORAL
Qty: 30 TABLET | Refills: 11 | Status: SHIPPED | OUTPATIENT
Start: 2018-01-04 | End: 2020-09-08

## 2018-01-04 RX ORDER — DIPHENHYDRAMINE HCL 25 MG
25 CAPSULE ORAL ONCE
Status: COMPLETED | OUTPATIENT
Start: 2018-01-04 | End: 2018-01-04

## 2018-01-04 RX ADMIN — FERUMOXYTOL 510 MG: 510 INJECTION INTRAVENOUS at 14:33

## 2018-01-04 RX ADMIN — DIPHENHYDRAMINE HYDROCHLORIDE 25 MG: 25 CAPSULE ORAL at 14:07

## 2018-01-04 RX ADMIN — SODIUM CHLORIDE 250 ML: 9 INJECTION, SOLUTION INTRAVENOUS at 14:00

## 2018-01-04 RX ADMIN — FAMOTIDINE 20 MG: 10 INJECTION, SOLUTION INTRAVENOUS at 14:07

## 2018-01-11 ENCOUNTER — INFUSION (OUTPATIENT)
Dept: ONCOLOGY | Facility: HOSPITAL | Age: 83
End: 2018-01-11

## 2018-01-11 VITALS
DIASTOLIC BLOOD PRESSURE: 64 MMHG | RESPIRATION RATE: 16 BRPM | HEART RATE: 53 BPM | SYSTOLIC BLOOD PRESSURE: 137 MMHG | TEMPERATURE: 97.3 F

## 2018-01-11 DIAGNOSIS — IMO0001 IRON AND ITS COMPOUNDS CAUSING ADVERSE EFFECT IN THERAPEUTIC USE, INITIAL ENCOUNTER: Primary | ICD-10-CM

## 2018-01-11 DIAGNOSIS — D50.9 IRON DEFICIENCY ANEMIA, UNSPECIFIED IRON DEFICIENCY ANEMIA TYPE: ICD-10-CM

## 2018-01-11 PROCEDURE — 25010000002 FERUMOXYTOL 510 MG/17ML SOLUTION 510 MG VIAL: Performed by: INTERNAL MEDICINE

## 2018-01-11 PROCEDURE — 96375 TX/PRO/DX INJ NEW DRUG ADDON: CPT | Performed by: NURSE PRACTITIONER

## 2018-01-11 PROCEDURE — 96374 THER/PROPH/DIAG INJ IV PUSH: CPT | Performed by: NURSE PRACTITIONER

## 2018-01-11 PROCEDURE — 63710000001 DIPHENHYDRAMINE PER 50 MG: Performed by: INTERNAL MEDICINE

## 2018-01-11 RX ORDER — DIPHENHYDRAMINE HCL 25 MG
25 CAPSULE ORAL ONCE
Status: COMPLETED | OUTPATIENT
Start: 2018-01-11 | End: 2018-01-11

## 2018-01-11 RX ORDER — SODIUM CHLORIDE 9 MG/ML
250 INJECTION, SOLUTION INTRAVENOUS ONCE
Status: CANCELLED | OUTPATIENT
Start: 2018-01-11

## 2018-01-11 RX ORDER — SODIUM CHLORIDE 9 MG/ML
250 INJECTION, SOLUTION INTRAVENOUS ONCE
Status: COMPLETED | OUTPATIENT
Start: 2018-01-11 | End: 2018-01-11

## 2018-01-11 RX ORDER — DIPHENHYDRAMINE HCL 25 MG
25 CAPSULE ORAL ONCE
Status: CANCELLED | OUTPATIENT
Start: 2018-01-11

## 2018-01-11 RX ORDER — FAMOTIDINE 10 MG/ML
20 INJECTION, SOLUTION INTRAVENOUS ONCE
Status: COMPLETED | OUTPATIENT
Start: 2018-01-11 | End: 2018-01-11

## 2018-01-11 RX ORDER — SODIUM CHLORIDE 0.9 % (FLUSH) 0.9 %
SYRINGE (ML) INJECTION
Status: DISCONTINUED
Start: 2018-01-11 | End: 2018-01-11 | Stop reason: HOSPADM

## 2018-01-11 RX ADMIN — DIPHENHYDRAMINE HYDROCHLORIDE 25 MG: 25 CAPSULE ORAL at 14:24

## 2018-01-11 RX ADMIN — SODIUM CHLORIDE 250 ML: 9 INJECTION, SOLUTION INTRAVENOUS at 14:10

## 2018-01-11 RX ADMIN — FAMOTIDINE 20 MG: 10 INJECTION, SOLUTION INTRAVENOUS at 14:24

## 2018-01-11 RX ADMIN — FERUMOXYTOL 510 MG: 510 INJECTION INTRAVENOUS at 14:35

## 2018-01-22 RX ORDER — OMEPRAZOLE 40 MG/1
40 CAPSULE, DELAYED RELEASE ORAL EVERY MORNING
Qty: 30 CAPSULE | Refills: 11 | Status: SHIPPED | OUTPATIENT
Start: 2018-01-22 | End: 2018-02-22 | Stop reason: SDUPTHER

## 2018-02-06 ENCOUNTER — LAB (OUTPATIENT)
Dept: LAB | Facility: OTHER | Age: 83
End: 2018-02-06

## 2018-02-06 DIAGNOSIS — E78.2 MIXED HYPERLIPIDEMIA: Chronic | ICD-10-CM

## 2018-02-06 DIAGNOSIS — N18.30 TYPE 2 DIABETES MELLITUS WITH STAGE 3 CHRONIC KIDNEY DISEASE, WITHOUT LONG-TERM CURRENT USE OF INSULIN (HCC): Chronic | ICD-10-CM

## 2018-02-06 DIAGNOSIS — E11.22 TYPE 2 DIABETES MELLITUS WITH STAGE 3 CHRONIC KIDNEY DISEASE, WITHOUT LONG-TERM CURRENT USE OF INSULIN (HCC): Chronic | ICD-10-CM

## 2018-02-06 DIAGNOSIS — N18.9 ANEMIA IN CHRONIC KIDNEY DISEASE: Chronic | ICD-10-CM

## 2018-02-06 DIAGNOSIS — D63.1 ANEMIA IN CHRONIC KIDNEY DISEASE: Chronic | ICD-10-CM

## 2018-02-06 LAB
ALBUMIN SERPL-MCNC: 4.4 G/DL (ref 3.2–5.5)
ALBUMIN UR-MCNC: 0.6 MG/L
ALBUMIN/GLOB SERPL: 1.5 G/DL (ref 1–3)
ALP SERPL-CCNC: 120 U/L (ref 15–121)
ALT SERPL W P-5'-P-CCNC: 16 U/L (ref 10–60)
ANION GAP SERPL CALCULATED.3IONS-SCNC: 9 MMOL/L (ref 5–15)
AST SERPL-CCNC: 23 U/L (ref 10–60)
BASOPHILS # BLD AUTO: 0.04 10*3/MM3 (ref 0–0.2)
BASOPHILS NFR BLD AUTO: 0.5 % (ref 0–2)
BILIRUB SERPL-MCNC: 0.7 MG/DL (ref 0.2–1)
BUN BLD-MCNC: 48 MG/DL (ref 8–25)
BUN/CREAT SERPL: 19.2 (ref 7–25)
CALCIUM SPEC-SCNC: 9.3 MG/DL (ref 8.4–10.8)
CHLORIDE SERPL-SCNC: 101 MMOL/L (ref 100–112)
CHOLEST SERPL-MCNC: 319 MG/DL (ref 150–200)
CO2 SERPL-SCNC: 28 MMOL/L (ref 20–32)
CREAT BLD-MCNC: 2.5 MG/DL (ref 0.4–1.3)
CREAT UR-MCNC: 19.6 MG/DL
DEPRECATED RDW RBC AUTO: 71.4 FL (ref 35.1–43.9)
EOSINOPHIL # BLD AUTO: 0.17 10*3/MM3 (ref 0–0.7)
EOSINOPHIL NFR BLD AUTO: 2.3 % (ref 0–7)
ERYTHROCYTE [DISTWIDTH] IN BLOOD BY AUTOMATED COUNT: 24.4 % (ref 11.5–14.5)
FERRITIN SERPL-MCNC: 207 NG/ML (ref 17.9–464)
GFR SERPL CREATININE-BSD FRML MDRD: 24 ML/MIN/1.73 (ref 42–98)
GLOBULIN UR ELPH-MCNC: 3 GM/DL (ref 2.5–4.6)
GLUCOSE BLD-MCNC: 120 MG/DL (ref 70–100)
HBA1C MFR BLD: 5.3 % (ref 4–5.6)
HCT VFR BLD AUTO: 38.9 % (ref 39–49)
HDLC SERPL-MCNC: 46 MG/DL (ref 35–100)
HGB BLD-MCNC: 12 G/DL (ref 13.7–17.3)
LDLC SERPL CALC-MCNC: 214 MG/DL
LDLC/HDLC SERPL: 4.65 {RATIO}
LYMPHOCYTES # BLD AUTO: 1.63 10*3/MM3 (ref 0.6–4.2)
LYMPHOCYTES NFR BLD AUTO: 22.2 % (ref 10–50)
MCH RBC QN AUTO: 26.9 PG (ref 26.5–34)
MCHC RBC AUTO-ENTMCNC: 30.8 G/DL (ref 31.5–36.3)
MCV RBC AUTO: 87.2 FL (ref 80–98)
MICROALBUMIN/CREAT UR: 30.6 MG/G (ref 0–30)
MONOCYTES # BLD AUTO: 0.54 10*3/MM3 (ref 0–0.9)
MONOCYTES NFR BLD AUTO: 7.4 % (ref 0–12)
NEUTROPHILS # BLD AUTO: 4.95 10*3/MM3 (ref 2–8.6)
NEUTROPHILS NFR BLD AUTO: 67.6 % (ref 37–80)
PLATELET # BLD AUTO: 224 10*3/MM3 (ref 150–450)
PMV BLD AUTO: 10.1 FL (ref 8–12)
POTASSIUM BLD-SCNC: 4.5 MMOL/L (ref 3.4–5.4)
PROT SERPL-MCNC: 7.4 G/DL (ref 6.7–8.2)
RBC # BLD AUTO: 4.46 10*6/MM3 (ref 4.37–5.74)
SODIUM BLD-SCNC: 138 MMOL/L (ref 134–146)
TRIGL SERPL-MCNC: 295 MG/DL (ref 35–160)
VIT B12 BLD-MCNC: 577 PG/ML (ref 239–931)
VLDLC SERPL-MCNC: 59 MG/DL
WBC NRBC COR # BLD: 7.33 10*3/MM3 (ref 3.2–9.8)

## 2018-02-06 PROCEDURE — 80053 COMPREHEN METABOLIC PANEL: CPT | Performed by: INTERNAL MEDICINE

## 2018-02-06 PROCEDURE — 82570 ASSAY OF URINE CREATININE: CPT | Performed by: INTERNAL MEDICINE

## 2018-02-06 PROCEDURE — 36415 COLL VENOUS BLD VENIPUNCTURE: CPT | Performed by: INTERNAL MEDICINE

## 2018-02-06 PROCEDURE — 82043 UR ALBUMIN QUANTITATIVE: CPT | Performed by: INTERNAL MEDICINE

## 2018-02-06 PROCEDURE — 85025 COMPLETE CBC W/AUTO DIFF WBC: CPT | Performed by: INTERNAL MEDICINE

## 2018-02-06 PROCEDURE — 82607 VITAMIN B-12: CPT | Performed by: INTERNAL MEDICINE

## 2018-02-06 PROCEDURE — 83036 HEMOGLOBIN GLYCOSYLATED A1C: CPT | Performed by: INTERNAL MEDICINE

## 2018-02-06 PROCEDURE — 82728 ASSAY OF FERRITIN: CPT | Performed by: INTERNAL MEDICINE

## 2018-02-06 PROCEDURE — 80061 LIPID PANEL: CPT | Performed by: INTERNAL MEDICINE

## 2018-02-14 ENCOUNTER — OFFICE VISIT (OUTPATIENT)
Dept: FAMILY MEDICINE CLINIC | Facility: CLINIC | Age: 83
End: 2018-02-14

## 2018-02-14 VITALS
BODY MASS INDEX: 25.39 KG/M2 | TEMPERATURE: 97.5 F | HEIGHT: 69 IN | WEIGHT: 171.4 LBS | SYSTOLIC BLOOD PRESSURE: 130 MMHG | DIASTOLIC BLOOD PRESSURE: 66 MMHG | HEART RATE: 56 BPM

## 2018-02-14 DIAGNOSIS — I73.9 CLAUDICATION (HCC): Chronic | ICD-10-CM

## 2018-02-14 DIAGNOSIS — D50.9 IRON DEFICIENCY ANEMIA, UNSPECIFIED IRON DEFICIENCY ANEMIA TYPE: Chronic | ICD-10-CM

## 2018-02-14 DIAGNOSIS — E11.22 TYPE 2 DIABETES MELLITUS WITH STAGE 3 CHRONIC KIDNEY DISEASE, WITHOUT LONG-TERM CURRENT USE OF INSULIN (HCC): Primary | Chronic | ICD-10-CM

## 2018-02-14 DIAGNOSIS — I10 ESSENTIAL HYPERTENSION: Chronic | ICD-10-CM

## 2018-02-14 DIAGNOSIS — C61 MALIGNANT TUMOR OF PROSTATE (HCC): Chronic | ICD-10-CM

## 2018-02-14 DIAGNOSIS — K21.9 GASTROESOPHAGEAL REFLUX DISEASE WITHOUT ESOPHAGITIS: Chronic | ICD-10-CM

## 2018-02-14 DIAGNOSIS — I25.10 CORONARY ARTERIOSCLEROSIS: Chronic | ICD-10-CM

## 2018-02-14 DIAGNOSIS — I87.2 PERIPHERAL VENOUS INSUFFICIENCY: Chronic | ICD-10-CM

## 2018-02-14 DIAGNOSIS — N18.30 CHRONIC KIDNEY DISEASE, STAGE 3 (HCC): Chronic | ICD-10-CM

## 2018-02-14 DIAGNOSIS — I73.9 PERIPHERAL VASCULAR DISEASE (HCC): Chronic | ICD-10-CM

## 2018-02-14 DIAGNOSIS — E78.2 MIXED HYPERLIPIDEMIA: Chronic | ICD-10-CM

## 2018-02-14 DIAGNOSIS — N18.30 TYPE 2 DIABETES MELLITUS WITH STAGE 3 CHRONIC KIDNEY DISEASE, WITHOUT LONG-TERM CURRENT USE OF INSULIN (HCC): Primary | Chronic | ICD-10-CM

## 2018-02-14 PROCEDURE — 99214 OFFICE O/P EST MOD 30 MIN: CPT | Performed by: INTERNAL MEDICINE

## 2018-02-14 RX ORDER — ROSUVASTATIN CALCIUM 10 MG/1
10 TABLET, COATED ORAL DAILY
Qty: 30 TABLET | Refills: 11 | Status: SHIPPED | OUTPATIENT
Start: 2018-02-14 | End: 2019-08-05 | Stop reason: SDUPTHER

## 2018-02-14 NOTE — PROGRESS NOTES
Subjective     History of Present Illness     Sy Florez is a 89 y.o. male who presents for 6-month follow up of CKD, CAD, PVD, PAD, hypertension, high cholesterol, iron deficiency anemia, and diet-controlled type 2 diabetes, among other issues.  He continues on 5 mg of prednisone daily.   He reports one  recent flares of his autoimmune skin disease, which fails to fit typical diagnostic criteria.  This was addressed by dermatology and he had good results with use of topical steroids.     Last summer, he underwent left lower extremity bypass x 2 below the left knee of the left lower extremity by vascular surgeon at [facility] for his PAD.  Attempts at angioplasty were unsuccessful. He continues to have claudication symptoms with rest, which improves with activity.  He has not mentioned this to vascular surgery.     We referred to Dr. Patricio Mccord/hematology/oncology for evaluation of iron deficiency anemia.  He had IV iron infusion x 2.  Hemoglobin increased from 8 to 12 and ferritin significantly improved to 207.      Blood pressure is at goal.      Total cholesterol is above goal at 319 and LDL above goal at 214.  He has been unable to tolerate statins due to myalgias.  He agrees to try to restart a statin.  A prescription is sent for Crestor.         The patient's relevant past medical, surgical, and social history was reviewed in Epic.   Lab results are reviewed with the patient today.  CBC unremarkable. Renal function shows slight decline with creatinine 2.5, slightly above his baseline.  Liver function normal.        Review of Systems   Constitutional: Negative for chills, fatigue and fever.   HENT: Negative for congestion, ear pain, postnasal drip, sinus pressure and sore throat.    Respiratory: Negative for cough, shortness of breath and wheezing.    Cardiovascular: Negative for chest pain, palpitations and leg swelling.   Gastrointestinal: Negative for abdominal pain, blood in  "stool, constipation, diarrhea, nausea and vomiting.   Endocrine: Negative for cold intolerance, heat intolerance, polydipsia and polyuria.   Genitourinary: Negative for dysuria, frequency, hematuria and urgency.   Skin: Negative for rash.   Neurological: Negative for syncope and weakness.        Objective      Vitals:    02/14/18 1443   BP: 130/66   Pulse: 56   Temp: 97.5 °F (36.4 °C)   TempSrc: Oral   Weight: 77.7 kg (171 lb 6.4 oz)   Height: 175.3 cm (69\")       Physical Exam   Constitutional: He is oriented to person, place, and time. He appears well-developed and well-nourished. No distress.   HENT:   Head: Normocephalic and atraumatic.   Nose: Right sinus exhibits no maxillary sinus tenderness and no frontal sinus tenderness. Left sinus exhibits no maxillary sinus tenderness and no frontal sinus tenderness.   Mouth/Throat: Uvula is midline, oropharynx is clear and moist and mucous membranes are normal. No oral lesions. No tonsillar exudate.   Eyes: Conjunctivae and EOM are normal. Pupils are equal, round, and reactive to light.   Neck: Trachea normal. Neck supple. No JVD present. Carotid bruit is not present. No tracheal deviation present. No thyroid mass and no thyromegaly present.   Cardiovascular: Normal rate, regular rhythm, normal heart sounds and intact distal pulses.   No extrasystoles are present. PMI is not displaced.    No murmur heard.  Pulmonary/Chest: Effort normal and breath sounds normal. No accessory muscle usage. No respiratory distress. He has no decreased breath sounds. He has no wheezes. He has no rhonchi. He has no rales.   Chronic lung sounds.   Diminished excursion on expiratory phase of cough.    Abdominal: Soft. Bowel sounds are normal. He exhibits no distension. There is no hepatosplenomegaly. There is no tenderness.       Vascular Status -  His exam exhibits right foot edema (1+ edema left lower extremity and trace edema on right with diminished pulses and evidence of chronic venous " insufficiency. ). His exam exhibits right foot vasculature abnormal. His exam exhibits left foot edema. His exam exhibits left foot vasculature abnormal.  Lymphadenopathy:     He has no cervical adenopathy.   Neurological: He is alert and oriented to person, place, and time. No cranial nerve deficit. Coordination normal.   Skin: Skin is warm, dry and intact. No rash noted. No cyanosis. Nails show no clubbing.   Sun damaged skin.      Psychiatric: He has a normal mood and affect. His speech is normal and behavior is normal. Judgment and thought content normal.   Vitals reviewed.    Future Appointments  Date Time Provider Department Center   2/28/2018 10:45 AM NURSE [facility] OPI [facility]   2/28/2018 11:15 AM Patricio Mccord MD [facility] ONC [facility]   8/17/2018 10:30 AM John Rodriguez MD [facility] POW None       Assessment/Plan      Start Crestor 10 mg three times a week.  He can take interval breaks if he starts to have increased myalgias.     Continue the vascular risk factor modifications.    Continue omeprazole    Continue altace and sodium restriction.    Continue low dose prednisone 5 mg daily    I recommended he mention the claudication symptoms with rest, which improves with activity to vascular surgery.  I recommended physical activity several times daily to help with the claudication symptoms.     Continue to avoid NSAIDs and other nephrotoxic drugs due to CKD.     Continue other medications and vitamin and mineral supplements to treat additional medical problems which we addressed today.  He will return in six months for follow up with fasting labs one week prior.     Scribed for Dr. Rodriguez by Johanna Reyes [facility].           Diagnoses and all orders for this visit:    Type 2 diabetes mellitus with stage 3 chronic kidney disease, without long-term current use of insulin  -     CBC Auto Differential; Future  -     Comprehensive Metabolic Panel; Future  -     Hemoglobin A1c; Future  -     LDL Cholesterol, Direct;  Future  -     PSA DIAGNOSTIC; Future  -     TSH; Future  -     Vitamin D 25 Hydroxy; Future    Gastroesophageal reflux disease without esophagitis  -     Vitamin D 25 Hydroxy; Future    Essential hypertension  -     Vitamin D 25 Hydroxy; Future    Coronary arteriosclerosis  -     LDL Cholesterol, Direct; Future  -     Vitamin D 25 Hydroxy; Future    Mixed hyperlipidemia  -     LDL Cholesterol, Direct; Future  -     Vitamin D 25 Hydroxy; Future    Peripheral vascular disease  -     Vitamin D 25 Hydroxy; Future    Peripheral venous insufficiency  -     Vitamin D 25 Hydroxy; Future    Claudication  -     Vitamin D 25 Hydroxy; Future    Malignant tumor of prostate  -     PSA DIAGNOSTIC; Future  -     Vitamin D 25 Hydroxy; Future    Chronic kidney disease, stage 3  -     Vitamin D 25 Hydroxy; Future    Iron deficiency anemia, unspecified iron deficiency anemia type  -     Ferritin; Future  -     Iron Profile; Future  -     Vitamin D 25 Hydroxy; Future    Other orders  -     rosuvastatin (CRESTOR) 10 MG tablet; Take 1 tablet by mouth Daily.      Lab on 02/06/2018   Component Date Value Ref Range Status   • Microalbumin/Creatinine Ratio 02/06/2018 30.6* 0.0 - 30.0 mg/g Final    Unable to calculate   • Creatinine, Urine 02/06/2018 19.6  mg/dL Final   • Microalbumin, Urine 02/06/2018 0.6  mg/L Final   • Glucose 02/06/2018 120* 70 - 100 mg/dL Final   • BUN 02/06/2018 48* 8 - 25 mg/dL Final   • Creatinine 02/06/2018 2.50* 0.40 - 1.30 mg/dL Final   • Sodium 02/06/2018 138  134 - 146 mmol/L Final   • Potassium 02/06/2018 4.5  3.4 - 5.4 mmol/L Final   • Chloride 02/06/2018 101  100 - 112 mmol/L Final   • CO2 02/06/2018 28.0  20.0 - 32.0 mmol/L Final   • Calcium 02/06/2018 9.3  8.4 - 10.8 mg/dL Final   • Total Protein 02/06/2018 7.4  6.7 - 8.2 g/dL Final   • Albumin 02/06/2018 4.40  3.20 - 5.50 g/dL Final   • ALT (SGPT) 02/06/2018 16  10 - 60 U/L Final   • AST (SGOT) 02/06/2018 23  10 - 60 U/L Final   • Alkaline Phosphatase  02/06/2018 120  15 - 121 U/L Final   • Total Bilirubin 02/06/2018 0.7  0.2 - 1.0 mg/dL Final   • eGFR Non African Amer 02/06/2018 24* 42 - 98 mL/min/1.73 Final   • Globulin 02/06/2018 3.0  2.5 - 4.6 gm/dL Final   • A/G Ratio 02/06/2018 1.5  1.0 - 3.0 g/dL Final   • BUN/Creatinine Ratio 02/06/2018 19.2  7.0 - 25.0 Final   • Anion Gap 02/06/2018 9.0  5.0 - 15.0 mmol/L Final   • WBC 02/06/2018 7.33  3.20 - 9.80 10*3/mm3 Final   • RBC 02/06/2018 4.46  4.37 - 5.74 10*6/mm3 Final   • Hemoglobin 02/06/2018 12.0* 13.7 - 17.3 g/dL Final   • Hematocrit 02/06/2018 38.9* 39.0 - 49.0 % Final   • MCV 02/06/2018 87.2  80.0 - 98.0 fL Final   • MCH 02/06/2018 26.9  26.5 - 34.0 pg Final   • MCHC 02/06/2018 30.8* 31.5 - 36.3 g/dL Final   • RDW 02/06/2018 24.4* 11.5 - 14.5 % Final   • RDW-SD 02/06/2018 71.4* 35.1 - 43.9 fl Final   • MPV 02/06/2018 10.1  8.0 - 12.0 fL Final   • Platelets 02/06/2018 224  150 - 450 10*3/mm3 Final   • Neutrophil % 02/06/2018 67.6  37.0 - 80.0 % Final   • Lymphocyte % 02/06/2018 22.2  10.0 - 50.0 % Final   • Monocyte % 02/06/2018 7.4  0.0 - 12.0 % Final   • Eosinophil % 02/06/2018 2.3  0.0 - 7.0 % Final   • Basophil % 02/06/2018 0.5  0.0 - 2.0 % Final   • Neutrophils, Absolute 02/06/2018 4.95  2.00 - 8.60 10*3/mm3 Final   • Lymphocytes, Absolute 02/06/2018 1.63  0.60 - 4.20 10*3/mm3 Final   • Monocytes, Absolute 02/06/2018 0.54  0.00 - 0.90 10*3/mm3 Final   • Eosinophils, Absolute 02/06/2018 0.17  0.00 - 0.70 10*3/mm3 Final   • Basophils, Absolute 02/06/2018 0.04  0.00 - 0.20 10*3/mm3 Final   • Vitamin B-12 02/06/2018 577  239 - 931 pg/mL Final   • Ferritin 02/06/2018 207.00  17.90 - 464.00 ng/mL Final   • Total Cholesterol 02/06/2018 319* 150 - 200 mg/dL Final   • Triglycerides 02/06/2018 295* 35 - 160 mg/dL Final   • HDL Cholesterol 02/06/2018 46  35 - 100 mg/dL Final   • LDL Cholesterol  02/06/2018 214  mg/dL Final   • VLDL Cholesterol 02/06/2018 59  mg/dL Final   • LDL/HDL Ratio 02/06/2018 4.65   Final    • Hemoglobin A1C 02/06/2018 5.3  4 - 5.6 % Final   ]

## 2018-02-22 RX ORDER — OMEPRAZOLE 40 MG/1
40 CAPSULE, DELAYED RELEASE ORAL EVERY MORNING
Qty: 30 CAPSULE | Refills: 11 | Status: SHIPPED | OUTPATIENT
Start: 2018-02-22 | End: 2019-03-05 | Stop reason: SDUPTHER

## 2018-02-28 ENCOUNTER — OFFICE VISIT (OUTPATIENT)
Dept: ONCOLOGY | Facility: CLINIC | Age: 83
End: 2018-02-28

## 2018-02-28 ENCOUNTER — LAB (OUTPATIENT)
Dept: ONCOLOGY | Facility: HOSPITAL | Age: 83
End: 2018-02-28

## 2018-02-28 VITALS
HEART RATE: 66 BPM | HEIGHT: 69 IN | TEMPERATURE: 97.7 F | WEIGHT: 169.53 LBS | BODY MASS INDEX: 25.11 KG/M2 | SYSTOLIC BLOOD PRESSURE: 140 MMHG | RESPIRATION RATE: 16 BRPM | DIASTOLIC BLOOD PRESSURE: 70 MMHG

## 2018-02-28 DIAGNOSIS — D50.9 IRON DEFICIENCY ANEMIA, UNSPECIFIED IRON DEFICIENCY ANEMIA TYPE: Primary | Chronic | ICD-10-CM

## 2018-02-28 DIAGNOSIS — D64.9 ANEMIA, UNSPECIFIED TYPE: ICD-10-CM

## 2018-02-28 DIAGNOSIS — N18.30 CHRONIC KIDNEY DISEASE, STAGE 3 (HCC): Chronic | ICD-10-CM

## 2018-02-28 LAB
ALBUMIN SERPL-MCNC: 4.7 G/DL (ref 3.4–4.8)
ALBUMIN/GLOB SERPL: 1.5 G/DL (ref 1.1–1.8)
ALP SERPL-CCNC: 134 U/L (ref 38–126)
ALT SERPL W P-5'-P-CCNC: 27 U/L (ref 21–72)
ANION GAP SERPL CALCULATED.3IONS-SCNC: 16 MMOL/L (ref 5–15)
AST SERPL-CCNC: 22 U/L (ref 17–59)
BASOPHILS # BLD AUTO: 0.04 10*3/MM3 (ref 0–0.2)
BASOPHILS NFR BLD AUTO: 0.5 % (ref 0–2)
BILIRUB SERPL-MCNC: 0.4 MG/DL (ref 0.2–1.3)
BUN BLD-MCNC: 52 MG/DL (ref 7–21)
BUN/CREAT SERPL: 24.4 (ref 7–25)
CALCIUM SPEC-SCNC: 9.5 MG/DL (ref 8.4–10.2)
CHLORIDE SERPL-SCNC: 99 MMOL/L (ref 95–110)
CO2 SERPL-SCNC: 24 MMOL/L (ref 22–31)
CREAT BLD-MCNC: 2.13 MG/DL (ref 0.7–1.3)
DEPRECATED RDW RBC AUTO: 69.1 FL (ref 35.1–43.9)
EOSINOPHIL # BLD AUTO: 0.06 10*3/MM3 (ref 0–0.7)
EOSINOPHIL NFR BLD AUTO: 0.7 % (ref 0–7)
ERYTHROCYTE [DISTWIDTH] IN BLOOD BY AUTOMATED COUNT: 23 % (ref 11.5–14.5)
FERRITIN SERPL-MCNC: 140 NG/ML (ref 17.9–464)
FOLATE SERPL-MCNC: 13.2 NG/ML (ref 2.76–21)
GFR SERPL CREATININE-BSD FRML MDRD: 29 ML/MIN/1.73 (ref 42–98)
GLOBULIN UR ELPH-MCNC: 3.2 GM/DL (ref 2.3–3.5)
GLUCOSE BLD-MCNC: 116 MG/DL (ref 60–100)
HCT VFR BLD AUTO: 39.6 % (ref 39–49)
HGB BLD-MCNC: 12.6 G/DL (ref 13.7–17.3)
IMM GRANULOCYTES # BLD: 0.03 10*3/MM3 (ref 0–0.02)
IMM GRANULOCYTES NFR BLD: 0.4 % (ref 0–0.5)
IRON 24H UR-MRATE: 79 MCG/DL (ref 49–181)
IRON SATN MFR SERPL: 25 % (ref 20–55)
LYMPHOCYTES # BLD AUTO: 1.06 10*3/MM3 (ref 0.6–4.2)
LYMPHOCYTES NFR BLD AUTO: 12.6 % (ref 10–50)
MCH RBC QN AUTO: 27 PG (ref 26.5–34)
MCHC RBC AUTO-ENTMCNC: 31.8 G/DL (ref 31.5–36.3)
MCV RBC AUTO: 84.8 FL (ref 80–98)
MONOCYTES # BLD AUTO: 0.43 10*3/MM3 (ref 0–0.9)
MONOCYTES NFR BLD AUTO: 5.1 % (ref 0–12)
NEUTROPHILS # BLD AUTO: 6.77 10*3/MM3 (ref 2–8.6)
NEUTROPHILS NFR BLD AUTO: 80.7 % (ref 37–80)
PLATELET # BLD AUTO: 198 10*3/MM3 (ref 150–450)
PMV BLD AUTO: 9.8 FL (ref 8–12)
POTASSIUM BLD-SCNC: 4.6 MMOL/L (ref 3.5–5.1)
PROT SERPL-MCNC: 7.9 G/DL (ref 6.3–8.6)
RBC # BLD AUTO: 4.67 10*6/MM3 (ref 4.37–5.74)
SODIUM BLD-SCNC: 139 MMOL/L (ref 137–145)
TIBC SERPL-MCNC: 321 MCG/DL (ref 261–462)
VIT B12 BLD-MCNC: 599 PG/ML (ref 239–931)
WBC NRBC COR # BLD: 8.39 10*3/MM3 (ref 3.2–9.8)

## 2018-02-28 PROCEDURE — 82746 ASSAY OF FOLIC ACID SERUM: CPT

## 2018-02-28 PROCEDURE — 83540 ASSAY OF IRON: CPT

## 2018-02-28 PROCEDURE — 99214 OFFICE O/P EST MOD 30 MIN: CPT | Performed by: INTERNAL MEDICINE

## 2018-02-28 PROCEDURE — 82607 VITAMIN B-12: CPT

## 2018-02-28 PROCEDURE — 80053 COMPREHEN METABOLIC PANEL: CPT

## 2018-02-28 PROCEDURE — 85025 COMPLETE CBC W/AUTO DIFF WBC: CPT

## 2018-02-28 PROCEDURE — 83550 IRON BINDING TEST: CPT

## 2018-02-28 PROCEDURE — 82728 ASSAY OF FERRITIN: CPT

## 2018-02-28 PROCEDURE — G0463 HOSPITAL OUTPT CLINIC VISIT: HCPCS | Performed by: INTERNAL MEDICINE

## 2018-02-28 NOTE — PROGRESS NOTES
DATE OF VISIT: 2/28/2018    REASON FOR VISIT: Iron deficiency anemia    HISTORY OF PRESENT ILLNESS:    89-year-old male with a past medical history significant for history of peripheral vascular disease status post femoropopliteal bypass, prostate cancer status post seed in 2001, history of coronary artery disease status post CABG was initially seen in consultation on December 20, 2017 for evaluation of iron deficiency anemia and not able to tolerate borderline.  Subsequently patient received 2 dose of intravenous Feraheme last of which was on January 11, 2018.  Patient is here for follow-up visit today.  Still complains of pain and discomfort in bilateral lower extremity.  Denies any bleeding.    PAST MEDICAL HISTORY:    Past Medical History:   Diagnosis Date   • Actinic keratosis    • Acute prostatitis     h/o 2014    • Anemia      New problem noted with preoperative labs, 10/2015.. Anemia workup initiated, 10/2015      • Arthritis      Possible inflammatory arthritis bilateral knees   • Bursitis of hip     right greater trochanteric bursitis, spring 2014   • Cellulitis of lower limb     mild bilateral lower extremities   • Chronic kidney disease, stage 3     - cr =1.8-2.0, gradually worsening   • Claudication     dr. you s/p numerous revascularization. medical management   • Coronary arteriosclerosis     dr. perez   • Degenerative joint disease involving multiple joints    • Disorder of lumbar spine     Lumbar laminectomy at [facility], 11/4/2015      • Dyslipidemia     Resumed statin therapy 1/2015. Lipitor   • Edema of lower extremity      Etiology undetermined. Chronic venous insufficiency is playing a role   • Essential hypertension    • Eustachian tube disorder    • Gastroesophageal reflux disease    • Hyperlipidemia     Resumed extended interval Lipitor, 7/2015   • Idiopathic peripheral neuropathy     Gradually increasing Neurontin. Referred to Dr. Vergara, neurologist, 4/2015      • IFG  (impaired fasting glucose)    • Iron deficiency anemia      New problem noted with preoperative labs, 10/2015.. Anemia workup initiated, 10/2015      • Leg cramp     nocturnal leg cramps   • Malaise and fatigue    • Malignant tumor of prostate     6/2001 s/p brachytherapy   • Peripheral vascular disease     PAD/claudication. Dr. Brennan      • Peripheral venous insufficiency     Chronic venous insufficiency bilateral lower extremities, left worse than right      • Pneumonia     H/O   • Pruritic rash     Autoimmune. Responds to methotrexate. Dr. Oliveros, dermatology. The patient stopped the methotrexate and declines resumption. Started low-dose prednisone, 4/2016      • Type 2 diabetes mellitus     Newly diagnosed, 8/2015. Started Amaryl, 8/2015. Metformin and pioglitizone contraindicated   • Type 2 diabetes mellitus with chronic kidney disease, without long-term current use of insulin     Newly diagnosed, 8/2015. Started Amaryl, 8/2015. Metformin and pioglitizone contraindicated   • UTI (urinary tract infection)     H/O,site not specified-possible       SOCIAL HISTORY:    Social History   Substance Use Topics   • Smoking status: Former Smoker   • Smokeless tobacco: Never Used   • Alcohol use No       Surgical History :  Past Surgical History:   Procedure Laterality Date   • CORONARY ARTERY BYPASS GRAFT      vein,two  09/2006   • ENDOSCOPY AND COLONOSCOPY      7/2011 declines   • OTHER SURGICAL HISTORY      femoral-popliteal bypass graft - right leg 1997/left leg 2007   • OTHER SURGICAL HISTORY      laser surgery of prostate, - radiation implant;  Last Performed: 06/2001   • OTHER SURGICAL HISTORY      low back disk surgery , - Lumbar laminectomy with L4/L5 mechanical fusion;  Last Performed: 11/2015   • OTHER SURGICAL HISTORY      remove impacted cerumen 4/06/16       ALLERGIES:    Allergies   Allergen Reactions   • Bacitracin Rash   • Formaldehyde Rash   • Framycetin Rash     All Fragrances   • Neomycin Rash   •  "Nickel Rash         FAMILY HISTORY:  Family History   Problem Relation Age of Onset   • Diabetes Mother    • Diabetes Father    • Diabetes Brother            REVIEW OF SYSTEMS:      CONSTITUTIONAL:  Complains of fatigue. Denies any fever, chills or weight loss.      HEENT:  No epistaxis, mouth sores or difficulty swallowing.     RESPIRATORY:  No new shortness of breath. No new cough or hemoptysis.     CARDIOVASCULAR:  No chest pain or palpitations.     GASTROINTESTINAL:  No abdominal pain nausea, vomiting or blood in the stool.     GENITOURINARY: No Dysuria or Hematuria.     MUSCULOSKELETAL:Positive for chronic arthritic pain affecting bilateral hand,leg and back.     LYMPHATICS:  Denies any abnormal swollen glands anywhere in the body.     NEUROLOGICAL : Complains of tingling and numbness in right foot secondary to poor circulation. No headache or dizziness. No seizures or balance problems.     SKIN: No new skin lesions.        PHYSICAL EXAMINATION:      VITAL SIGNS:  /70  Pulse 66  Temp 97.7 °F (36.5 °C) (Temporal Artery )   Resp 16  Ht 175.3 cm (69.02\")  Wt 76.9 kg (169 lb 8.5 oz)  BMI 25.02 kg/m2  Last 3 weights    02/28/18  1041   Weight: 76.9 kg (169 lb 8.5 oz)       ECOG  performance status:      GENERAL:  Not in any distress.    HEENT:  Normocephalic, Atraumatic.Mild Conjunctival pallor. No icterus. Extraocular Movements Intact. No Facial Asymmetry noted.    NECK:  No adenopathy. No JVD.    RESPIRATORY:  Fair air entry bilateral. No rhonchi or wheezing.    CARDIOVASCULAR:  S1, S2. Regular rate and rhythm. No murmur or gallop appreciated.    ABDOMEN:  Soft, obese, nontender. Bowel sounds present in all four quadrants.  No organomegaly appreciated.    EXTREMITIES:  Trace edema.No Calf Tenderness.    NEUROLOGIC:  Alert, awake and oriented ×3.  No  Motor or sensory deficit appreciated. Cranial Nerves 2-12 grossly intact.            DIAGNOSTIC DATA:    Glucose   Date Value Ref Range Status "   02/28/2018 116 (H) 60 - 100 mg/dL Final     Sodium   Date Value Ref Range Status   02/28/2018 139 137 - 145 mmol/L Final     Potassium   Date Value Ref Range Status   02/28/2018 4.6 3.5 - 5.1 mmol/L Final     CO2   Date Value Ref Range Status   02/28/2018 24.0 22.0 - 31.0 mmol/L Final     Chloride   Date Value Ref Range Status   02/28/2018 99 95 - 110 mmol/L Final     Anion Gap   Date Value Ref Range Status   02/28/2018 16.0 (H) 5.0 - 15.0 mmol/L Final     Creatinine   Date Value Ref Range Status   02/28/2018 2.13 (H) 0.70 - 1.30 mg/dL Final     BUN   Date Value Ref Range Status   02/28/2018 52 (H) 7 - 21 mg/dL Final     BUN/Creatinine Ratio   Date Value Ref Range Status   02/28/2018 24.4 7.0 - 25.0 Final     Calcium   Date Value Ref Range Status   02/28/2018 9.5 8.4 - 10.2 mg/dL Final     eGFR Non  Amer   Date Value Ref Range Status   02/28/2018 29 (L) 42 - 98 mL/min/1.73 Final     Alkaline Phosphatase   Date Value Ref Range Status   02/28/2018 134 (H) 38 - 126 U/L Final     Total Protein   Date Value Ref Range Status   02/28/2018 7.9 6.3 - 8.6 g/dL Final     ALT (SGPT)   Date Value Ref Range Status   02/28/2018 27 21 - 72 U/L Final     AST (SGOT)   Date Value Ref Range Status   02/28/2018 22 17 - 59 U/L Final     Total Bilirubin   Date Value Ref Range Status   02/28/2018 0.4 0.2 - 1.3 mg/dL Final     Albumin   Date Value Ref Range Status   02/28/2018 4.70 3.40 - 4.80 g/dL Final     Globulin   Date Value Ref Range Status   02/28/2018 3.2 2.3 - 3.5 gm/dL Final     A/G Ratio   Date Value Ref Range Status   02/28/2018 1.5 1.1 - 1.8 g/dL Final     Lab Results   Component Value Date    WBC 8.39 02/28/2018    HGB 12.6 (L) 02/28/2018    HCT 39.6 02/28/2018    MCV 84.8 02/28/2018     02/28/2018     Lab Results   Component Value Date    NEUTROABS 6.77 02/28/2018    IRON 79 02/28/2018    TIBC 321 02/28/2018    LABIRON 25 02/28/2018    FERRITIN 207.00 02/06/2018    YHCMFNHJ40 577 02/06/2018    FOLATE 11.70  12/20/2017     No results found for: , LABCA2, AFPTM, HCGQUANT, , CHROMGRNA, [id number], CEA, REFLABREPO  Component      Latest Ref Rng & Units 12/20/2017 2/28/2018   Iron      49 - 181 mcg/dL 13 (L) 79   TIBC      261 - 462 mcg/dL 440 321   Iron Saturation      20 - 55 % 3 (L) 25                   ASSESSMENT AND PLAN:       1.  Chronic normocytic anemia: Most likely iron deficiency secondary to iron saturation of 4%.  Patient also has a contributing factor from chronic kidney disease plus history of severe arthritis and poor circulation.  Patient states he has tried oral iron in the past without any success.  Oral iron gives him excessive stomach upset and constipation.  Patient received 2 doses of intravenous Feraheme January 4, 2018 in January 11, 2018 without any problem with iron infusion.  His hemoglobin is improved to 12.4.  Iron studies have normalized.  At this point we will monitored with CBC.  We will ask him to return to clinic in 3 months with repeat anemia workup to be done prior to that.  If patient again develops iron deficiency will need repeat gastroenterology workup which was discussed with patient.    2.  Chronic kidney disease stage III     3.  History of prostate cancer status post seed implant.     4.  History of peripheral vascular disease status post femoropopliteal bypass.  Had intervention done recently at [facility] in left lower extremity.     5.  Arthritis     6. Health maintenance: Patient quit smoking 35 years ago.  Had a colonoscopy and EGD in 2015 in [place].  Has an advanced directive.    Patricio Mccord MD  2/28/2018  11:28 AM        EMR Dragon/Transcription disclaimer:   Much of this encounter note is an electronic transcription/translation of spoken language to printed text. The electronic translation of spoken language may permit erroneous, or at times, nonsensical words or phrases to be inadvertently transcribed; Although I have reviewed the note for such  errors, some may still exist.

## 2018-03-12 RX ORDER — GABAPENTIN 600 MG/1
TABLET ORAL
Qty: 60 TABLET | Refills: 5 | Status: SHIPPED | OUTPATIENT
Start: 2018-03-12 | End: 2018-08-17 | Stop reason: SDUPTHER

## 2018-03-26 RX ORDER — RAMIPRIL 5 MG/1
CAPSULE ORAL
Qty: 30 CAPSULE | Refills: 5 | Status: SHIPPED | OUTPATIENT
Start: 2018-03-26 | End: 2019-06-18 | Stop reason: SDUPTHER

## 2018-04-16 ENCOUNTER — OFFICE VISIT (OUTPATIENT)
Dept: FAMILY MEDICINE CLINIC | Facility: CLINIC | Age: 83
End: 2018-04-16

## 2018-04-16 VITALS
HEART RATE: 52 BPM | BODY MASS INDEX: 25.8 KG/M2 | WEIGHT: 174.2 LBS | OXYGEN SATURATION: 97 % | DIASTOLIC BLOOD PRESSURE: 68 MMHG | TEMPERATURE: 97.4 F | SYSTOLIC BLOOD PRESSURE: 130 MMHG | HEIGHT: 69 IN

## 2018-04-16 DIAGNOSIS — J45.20 MILD INTERMITTENT ASTHMA IN ADULT WITHOUT COMPLICATION: Primary | ICD-10-CM

## 2018-04-16 DIAGNOSIS — N18.30 CHRONIC KIDNEY DISEASE, STAGE 3 (HCC): Chronic | ICD-10-CM

## 2018-04-16 DIAGNOSIS — J18.9 ACUTE PNEUMONITIS: ICD-10-CM

## 2018-04-16 DIAGNOSIS — I73.9 CLAUDICATION (HCC): Chronic | ICD-10-CM

## 2018-04-16 PROCEDURE — 99213 OFFICE O/P EST LOW 20 MIN: CPT | Performed by: INTERNAL MEDICINE

## 2018-04-16 NOTE — PROGRESS NOTES
Subjective        History of Present Illness     Sy Florez is a 89 y.o. male with a past medical history significant for history of peripheral vascular disease status post femoropopliteal bypass who reports bilateral lower extremity pain.  He reports his lower extremity claudication symptoms are improved since he had left lower extremity bypass x 2 below the left knee of the left lower extremity by vascular surgeon at [facility] for his PAD, but he is now having pain in his bilateral thighs consistent with vascular claudication to bilateral thighs, worse with walking and climbing stairs.  I recommended he mention the claudication symptoms of the thighs to vascular surgery to see what options are available.      He also reports wheezing for the past month.  He has had a nonproductive cough today, but hasn't noticed a frequent cough.  Denies fever or chills.   He was diagnosed with influenza and reports the wheezing has been present since that time despite using albuterol nebulizer treatments and ProAir inhaler without improvement.  He denies shortness of breath walking from the parking lot to the exam room today, but does admit to increased shortness of breath since he was diagnosed with influenza.  He is given a sample of Breo today to use one inhalation daily.       Review of Systems   Constitutional: Negative for chills, fatigue and fever.   HENT: Negative for congestion, ear pain, postnasal drip, sinus pressure and sore throat.    Respiratory: Positive for shortness of breath and wheezing. Negative for cough.    Cardiovascular: Negative for chest pain, palpitations and leg swelling.   Gastrointestinal: Negative for abdominal pain, blood in stool, constipation, diarrhea, nausea and vomiting.   Endocrine: Negative for cold intolerance, heat intolerance, polydipsia and polyuria.   Genitourinary: Negative for dysuria, frequency, hematuria and urgency.   Musculoskeletal:        Bilateral  "leg pain.    Skin: Negative for rash.   Neurological: Negative for syncope and weakness.        Objective     Vitals:    04/16/18 1425   BP: 130/68   Pulse: 52   Temp: 97.4 °F (36.3 °C)   TempSrc: Oral   SpO2: 97%   Weight: 79 kg (174 lb 3.2 oz)   Height: 175.3 cm (69\")     Physical Exam   Constitutional: He is oriented to person, place, and time. He appears well-developed and well-nourished. No distress.   HENT:   Head: Normocephalic and atraumatic.   Nose: Nose normal.   Mouth/Throat: Oropharynx is clear and moist. No oropharyngeal exudate.   Eyes: EOM are normal. Pupils are equal, round, and reactive to light.   Neck: Neck supple. No JVD present. No thyromegaly present.   Cardiovascular: Normal rate, regular rhythm and normal heart sounds.    Pulmonary/Chest: Effort normal and breath sounds normal. No accessory muscle usage. No respiratory distress. He has no wheezes. He has no rales.   Expiratory wheezes.    Abdominal: Soft. Bowel sounds are normal. He exhibits no distension. There is no tenderness.   Musculoskeletal: He exhibits no edema.   Lymphadenopathy:     He has no cervical adenopathy.   Neurological: He is alert and oriented to person, place, and time. No cranial nerve deficit.   Skin:   Sun damaged skin   Psychiatric: He has a normal mood and affect. His speech is normal and behavior is normal. Judgment and thought content normal.     Future Appointments  Date Time Provider Department Center   5/25/2018 10:00 AM [facility] OP INFU PROCEDURE CHAIR [facility] OPI [facility]   5/30/2018 1:15 PM Patricio Mccord MD [facility]   8/17/2018 10:30 AM John Rodriguez MD [facility] PC POW None         Assessment/Plan      Sample of Breo inhaler to use one inhalation daily.  Continue with the ProAir rescue inhaler as needed.  He has albuterol nebulizer treatments at home to use as well.  If wheezing does not improve/resolve over the course of the next few weeks, notify me and we will do further testing.       I recommended he discuss " the vascular claudication of bilateral thighs with his vascular surgeon or Dr. Brennan for available options.  I reminded him to be very cautious about allowing other physicians to use contrast to evaluate his vascular status due to his chronic kidney disease.    We continue to avoid NSAIDs and other nephrotoxic drugs due to CKD.      Continue prescription medications and return in August for routine follow up with fasting labs one week prior.      Scribed for Dr. Rodriguez by Johanna Reyes [facility].     Diagnoses and all orders for this visit:    Mild intermittent asthma in adult without complication    Acute pneumonitis    Claudication    Chronic kidney disease, stage 3        No visits with results within 3 Week(s) from this visit.   Latest known visit with results is:   Lab on 02/28/2018   Component Date Value Ref Range Status   • Glucose 02/28/2018 116* 60 - 100 mg/dL Final   • BUN 02/28/2018 52* 7 - 21 mg/dL Final   • Creatinine 02/28/2018 2.13* 0.70 - 1.30 mg/dL Final   • Sodium 02/28/2018 139  137 - 145 mmol/L Final   • Potassium 02/28/2018 4.6  3.5 - 5.1 mmol/L Final   • Chloride 02/28/2018 99  95 - 110 mmol/L Final   • CO2 02/28/2018 24.0  22.0 - 31.0 mmol/L Final   • Calcium 02/28/2018 9.5  8.4 - 10.2 mg/dL Final   • Total Protein 02/28/2018 7.9  6.3 - 8.6 g/dL Final   • Albumin 02/28/2018 4.70  3.40 - 4.80 g/dL Final   • ALT (SGPT) 02/28/2018 27  21 - 72 U/L Final   • AST (SGOT) 02/28/2018 22  17 - 59 U/L Final   • Alkaline Phosphatase 02/28/2018 134* 38 - 126 U/L Final   • Total Bilirubin 02/28/2018 0.4  0.2 - 1.3 mg/dL Final   • eGFR Non African Amer 02/28/2018 29* 42 - 98 mL/min/1.73 Final   • Globulin 02/28/2018 3.2  2.3 - 3.5 gm/dL Final   • A/G Ratio 02/28/2018 1.5  1.1 - 1.8 g/dL Final   • BUN/Creatinine Ratio 02/28/2018 24.4  7.0 - 25.0 Final   • Anion Gap 02/28/2018 16.0* 5.0 - 15.0 mmol/L Final   • Iron 02/28/2018 79  49 - 181 mcg/dL Final   • TIBC 02/28/2018 321  261 - 462 mcg/dL Final   • Iron  Saturation 02/28/2018 25  20 - 55 % Final   • Ferritin 02/28/2018 140.00  17.90 - 464.00 ng/mL Final   • Folate 02/28/2018 13.20  2.76 - 21.00 ng/mL Final   • Vitamin B-12 02/28/2018 599  239 - 931 pg/mL Final   • WBC 02/28/2018 8.39  3.20 - 9.80 10*3/mm3 Final   • RBC 02/28/2018 4.67  4.37 - 5.74 10*6/mm3 Final   • Hemoglobin 02/28/2018 12.6* 13.7 - 17.3 g/dL Final   • Hematocrit 02/28/2018 39.6  39.0 - 49.0 % Final   • MCV 02/28/2018 84.8  80.0 - 98.0 fL Final   • MCH 02/28/2018 27.0  26.5 - 34.0 pg Final   • MCHC 02/28/2018 31.8  31.5 - 36.3 g/dL Final   • RDW 02/28/2018 23.0* 11.5 - 14.5 % Final   • RDW-SD 02/28/2018 69.1* 35.1 - 43.9 fl Final   • MPV 02/28/2018 9.8  8.0 - 12.0 fL Final   • Platelets 02/28/2018 198  150 - 450 10*3/mm3 Final   • Neutrophil % 02/28/2018 80.7* 37.0 - 80.0 % Final   • Lymphocyte % 02/28/2018 12.6  10.0 - 50.0 % Final   • Monocyte % 02/28/2018 5.1  0.0 - 12.0 % Final   • Eosinophil % 02/28/2018 0.7  0.0 - 7.0 % Final   • Basophil % 02/28/2018 0.5  0.0 - 2.0 % Final   • Immature Grans % 02/28/2018 0.4  0.0 - 0.5 % Final   • Neutrophils, Absolute 02/28/2018 6.77  2.00 - 8.60 10*3/mm3 Final   • Lymphocytes, Absolute 02/28/2018 1.06  0.60 - 4.20 10*3/mm3 Final   • Monocytes, Absolute 02/28/2018 0.43  0.00 - 0.90 10*3/mm3 Final   • Eosinophils, Absolute 02/28/2018 0.06  0.00 - 0.70 10*3/mm3 Final   • Basophils, Absolute 02/28/2018 0.04  0.00 - 0.20 10*3/mm3 Final   • Immature Grans, Absolute 02/28/2018 0.03* 0.00 - 0.02 10*3/mm3 Final   ]

## 2018-05-10 RX ORDER — PREDNISONE 1 MG/1
TABLET ORAL
Qty: 60 TABLET | Refills: 2 | Status: SHIPPED | OUTPATIENT
Start: 2018-05-10 | End: 2018-11-27 | Stop reason: SDUPTHER

## 2018-05-25 ENCOUNTER — LAB (OUTPATIENT)
Dept: ONCOLOGY | Facility: HOSPITAL | Age: 83
End: 2018-05-25

## 2018-05-25 DIAGNOSIS — N18.30 CHRONIC KIDNEY DISEASE, STAGE 3 (HCC): ICD-10-CM

## 2018-05-25 DIAGNOSIS — D50.9 IRON DEFICIENCY ANEMIA, UNSPECIFIED IRON DEFICIENCY ANEMIA TYPE: ICD-10-CM

## 2018-05-25 LAB
ALBUMIN SERPL-MCNC: 4.5 G/DL (ref 3.4–4.8)
ALBUMIN/GLOB SERPL: 1.6 G/DL (ref 1.1–1.8)
ALP SERPL-CCNC: 105 U/L (ref 38–126)
ALT SERPL W P-5'-P-CCNC: 33 U/L (ref 21–72)
ANION GAP SERPL CALCULATED.3IONS-SCNC: 15 MMOL/L (ref 5–15)
AST SERPL-CCNC: 20 U/L (ref 17–59)
BASOPHILS # BLD AUTO: 0.01 10*3/MM3 (ref 0–0.2)
BASOPHILS NFR BLD AUTO: 0.1 % (ref 0–2)
BILIRUB SERPL-MCNC: 0.5 MG/DL (ref 0.2–1.3)
BUN BLD-MCNC: 58 MG/DL (ref 7–21)
BUN/CREAT SERPL: 22.3 (ref 7–25)
CALCIUM SPEC-SCNC: 9.7 MG/DL (ref 8.4–10.2)
CHLORIDE SERPL-SCNC: 103 MMOL/L (ref 95–110)
CO2 SERPL-SCNC: 21 MMOL/L (ref 22–31)
CREAT BLD-MCNC: 2.6 MG/DL (ref 0.7–1.3)
DEPRECATED RDW RBC AUTO: 50.1 FL (ref 35.1–43.9)
EOSINOPHIL # BLD AUTO: 0.02 10*3/MM3 (ref 0–0.7)
EOSINOPHIL NFR BLD AUTO: 0.2 % (ref 0–7)
ERYTHROCYTE [DISTWIDTH] IN BLOOD BY AUTOMATED COUNT: 15 % (ref 11.5–14.5)
FERRITIN SERPL-MCNC: 138 NG/ML (ref 17.9–464)
FOLATE SERPL-MCNC: 11.3 NG/ML (ref 2.76–21)
GFR SERPL CREATININE-BSD FRML MDRD: 23 ML/MIN/1.73 (ref 42–98)
GLOBULIN UR ELPH-MCNC: 2.9 GM/DL (ref 2.3–3.5)
GLUCOSE BLD-MCNC: 155 MG/DL (ref 60–100)
HCT VFR BLD AUTO: 37.8 % (ref 39–49)
HGB BLD-MCNC: 12.6 G/DL (ref 13.7–17.3)
IMM GRANULOCYTES # BLD: 0.13 10*3/MM3 (ref 0–0.02)
IMM GRANULOCYTES NFR BLD: 1.3 % (ref 0–0.5)
IRON 24H UR-MRATE: 94 MCG/DL (ref 49–181)
IRON SATN MFR SERPL: 28 % (ref 20–55)
LYMPHOCYTES # BLD AUTO: 1.18 10*3/MM3 (ref 0.6–4.2)
LYMPHOCYTES NFR BLD AUTO: 11.5 % (ref 10–50)
MCH RBC QN AUTO: 30.4 PG (ref 26.5–34)
MCHC RBC AUTO-ENTMCNC: 33.3 G/DL (ref 31.5–36.3)
MCV RBC AUTO: 91.3 FL (ref 80–98)
MONOCYTES # BLD AUTO: 0.49 10*3/MM3 (ref 0–0.9)
MONOCYTES NFR BLD AUTO: 4.8 % (ref 0–12)
NEUTROPHILS # BLD AUTO: 8.43 10*3/MM3 (ref 2–8.6)
NEUTROPHILS NFR BLD AUTO: 82.1 % (ref 37–80)
PLATELET # BLD AUTO: 229 10*3/MM3 (ref 150–450)
PMV BLD AUTO: 10.2 FL (ref 8–12)
POTASSIUM BLD-SCNC: 4.5 MMOL/L (ref 3.5–5.1)
PROT SERPL-MCNC: 7.4 G/DL (ref 6.3–8.6)
RBC # BLD AUTO: 4.14 10*6/MM3 (ref 4.37–5.74)
SODIUM BLD-SCNC: 139 MMOL/L (ref 137–145)
TIBC SERPL-MCNC: 331 MCG/DL (ref 261–462)
VIT B12 BLD-MCNC: 775 PG/ML (ref 239–931)
WBC NRBC COR # BLD: 10.26 10*3/MM3 (ref 3.2–9.8)

## 2018-05-25 PROCEDURE — 82607 VITAMIN B-12: CPT | Performed by: INTERNAL MEDICINE

## 2018-05-25 PROCEDURE — 82728 ASSAY OF FERRITIN: CPT | Performed by: INTERNAL MEDICINE

## 2018-05-25 PROCEDURE — 83540 ASSAY OF IRON: CPT | Performed by: INTERNAL MEDICINE

## 2018-05-25 PROCEDURE — 36415 COLL VENOUS BLD VENIPUNCTURE: CPT | Performed by: NURSE PRACTITIONER

## 2018-05-25 PROCEDURE — 80053 COMPREHEN METABOLIC PANEL: CPT | Performed by: INTERNAL MEDICINE

## 2018-05-25 PROCEDURE — 85025 COMPLETE CBC W/AUTO DIFF WBC: CPT | Performed by: INTERNAL MEDICINE

## 2018-05-25 PROCEDURE — 82746 ASSAY OF FOLIC ACID SERUM: CPT | Performed by: INTERNAL MEDICINE

## 2018-05-25 PROCEDURE — 83550 IRON BINDING TEST: CPT | Performed by: INTERNAL MEDICINE

## 2018-05-30 ENCOUNTER — OFFICE VISIT (OUTPATIENT)
Dept: ONCOLOGY | Facility: CLINIC | Age: 83
End: 2018-05-30

## 2018-05-30 VITALS
DIASTOLIC BLOOD PRESSURE: 67 MMHG | RESPIRATION RATE: 18 BRPM | HEART RATE: 52 BPM | WEIGHT: 168.6 LBS | HEIGHT: 69 IN | SYSTOLIC BLOOD PRESSURE: 116 MMHG | TEMPERATURE: 97.8 F | BODY MASS INDEX: 24.97 KG/M2

## 2018-05-30 DIAGNOSIS — R79.89 ELEVATED SERUM CREATININE: Primary | ICD-10-CM

## 2018-05-30 DIAGNOSIS — D50.9 IRON DEFICIENCY ANEMIA, UNSPECIFIED IRON DEFICIENCY ANEMIA TYPE: Chronic | ICD-10-CM

## 2018-05-30 PROCEDURE — 99214 OFFICE O/P EST MOD 30 MIN: CPT | Performed by: NURSE PRACTITIONER

## 2018-05-30 PROCEDURE — G0463 HOSPITAL OUTPT CLINIC VISIT: HCPCS | Performed by: NURSE PRACTITIONER

## 2018-05-30 RX ORDER — FOLIC ACID 1 MG/1
1 TABLET ORAL DAILY
Qty: 30 TABLET | Refills: 3 | Status: SHIPPED | OUTPATIENT
Start: 2018-05-30 | End: 2020-02-17

## 2018-05-30 NOTE — PROGRESS NOTES
Patient is aware that he is to go to [facility] to get lab work done in June.  Kaur Oropeza RN  May 30, 2018  2:25 PM

## 2018-05-30 NOTE — PROGRESS NOTES
DATE OF VISIT: 5/30/2018    REASON FOR VISIT:  Iron deficiency anemia     HISTORY OF PRESENT ILLNESS:    89-year-old male with a past medical history significant for history of peripheral vascular disease status post femoropopliteal bypass, prostate cancer status post seed in 2001, history of coronary artery disease status post CABG was initially seen in consultation on December 20, 2017 for evaluation of iron deficiency anemia and not able to tolerate borderline.  Subsequently patient received 2 dose of intravenous Feraheme last of which was on January 11, 2018.  Patient is here for follow-up visit today. Patient states he just recently had a cortisone injection for his hip pain.      PAST MEDICAL HISTORY:    Past Medical History:   Diagnosis Date   • Actinic keratosis    • Acute prostatitis     h/o 2014    • Anemia      New problem noted with preoperative labs, 10/2015.. Anemia workup initiated, 10/2015      • Arthritis      Possible inflammatory arthritis bilateral knees   • Bursitis of hip     right greater trochanteric bursitis, spring 2014   • Cellulitis of lower limb     mild bilateral lower extremities   • Chronic kidney disease, stage 3     - cr =1.8-2.0, gradually worsening   • Claudication     dr. you s/p numerous revascularization. medical management   • Coronary arteriosclerosis     dr. perez   • Degenerative joint disease involving multiple joints    • Disorder of lumbar spine     Lumbar laminectomy at [facility], 11/4/2015      • Dyslipidemia     Resumed statin therapy 1/2015. Lipitor   • Edema of lower extremity      Etiology undetermined. Chronic venous insufficiency is playing a role   • Essential hypertension    • Eustachian tube disorder    • Gastroesophageal reflux disease    • Hyperlipidemia     Resumed extended interval Lipitor, 7/2015   • Idiopathic peripheral neuropathy     Gradually increasing Neurontin. Referred to Dr. Vergara, neurologist, 4/2015      • IFG (impaired fasting  glucose)    • Iron deficiency anemia      New problem noted with preoperative labs, 10/2015.. Anemia workup initiated, 10/2015      • Leg cramp     nocturnal leg cramps   • Malaise and fatigue    • Malignant tumor of prostate     6/2001 s/p brachytherapy   • Peripheral vascular disease     PAD/claudication. Dr. Brennan      • Peripheral venous insufficiency     Chronic venous insufficiency bilateral lower extremities, left worse than right      • Pneumonia     H/O   • Pruritic rash     Autoimmune. Responds to methotrexate. Dr. Oliveros, dermatology. The patient stopped the methotrexate and declines resumption. Started low-dose prednisone, 4/2016      • Type 2 diabetes mellitus     Newly diagnosed, 8/2015. Started Amaryl, 8/2015. Metformin and pioglitizone contraindicated   • Type 2 diabetes mellitus with chronic kidney disease, without long-term current use of insulin     Newly diagnosed, 8/2015. Started Amaryl, 8/2015. Metformin and pioglitizone contraindicated   • UTI (urinary tract infection)     H/O,site not specified-possible       SOCIAL HISTORY:    Social History   Substance Use Topics   • Smoking status: Former Smoker     Types: Cigarettes   • Smokeless tobacco: Never Used      Comment: 40 years ago   • Alcohol use No       Surgical History :  Past Surgical History:   Procedure Laterality Date   • CORONARY ARTERY BYPASS GRAFT      vein,two  09/2006   • ENDOSCOPY AND COLONOSCOPY      7/2011 declines   • OTHER SURGICAL HISTORY      femoral-popliteal bypass graft - right leg 1997/left leg 2007   • OTHER SURGICAL HISTORY      laser surgery of prostate, - radiation implant;  Last Performed: 06/2001   • OTHER SURGICAL HISTORY      low back disk surgery , - Lumbar laminectomy with L4/L5 mechanical fusion;  Last Performed: 11/2015   • OTHER SURGICAL HISTORY      remove impacted cerumen 4/06/16       ALLERGIES:    Allergies   Allergen Reactions   • Bacitracin Rash   • Formaldehyde Rash   • Framycetin Rash     All  "Fragrances   • Neomycin Rash   • Nickel Rash       REVIEW OF SYSTEMS:      CONSTITUTIONAL:  No fever, chills, or night sweats.     HEENT:  No epistaxis, mouth sores, or difficulty swallowing.    RESPIRATORY:  No new shortness of breath or cough at present.    CARDIOVASCULAR:  No chest pain or palpitations.    GASTROINTESTINAL:  No abdominal pain, nausea, vomiting, or blood in the stool.    GENITOURINARY:  No dysuria or hematuria.    MUSCULOSKELETAL:  No any new back pain or arthralgias.     NEUROLOGICAL:  No tingling or numbness. No new headache or dizziness.     LYMPHATICS:  Denies any abnormal swollen and anywhere in the body.    SKIN:  Denies any new skin rash.    PHYSICAL EXAMINATION:      VITAL SIGNS:  /67   Pulse 52   Temp 97.8 °F (36.6 °C) (Temporal Artery )   Resp 18   Ht 175.3 cm (69.02\")   Wt 76.5 kg (168 lb 9.6 oz)   BMI 24.89 kg/m²     GENERAL:  Not in any distress.    HEENT:  Normocephalic, Atraumatic.Mild Conjunctival pallor. No icterus. No Facial Asymmetry noted.    NECK:  No adenopathy. No JVD.    RESPIRATORY:  Fair air entry bilateral. No rhonchi or wheezing.    CARDIOVASCULAR:  S1, S2. Regular rate and rhythm. No murmur or gallop appreciated.    ABDOMEN:  Soft, obese, nontender. Bowel sounds present in all four quadrants.  No organomegaly appreciated.    EXTREMITIES:  No edema.No Calf Tenderness.    NEUROLOGIC:  Alert, awake and oriented ×3.     SKIN : No new skin lesion identified  DIAGNOSTIC DATA:    Glucose   Date Value Ref Range Status   05/25/2018 155 (H) 60 - 100 mg/dL Final     Sodium   Date Value Ref Range Status   05/25/2018 139 137 - 145 mmol/L Final     Potassium   Date Value Ref Range Status   05/25/2018 4.5 3.5 - 5.1 mmol/L Final     CO2   Date Value Ref Range Status   05/25/2018 21.0 (L) 22.0 - 31.0 mmol/L Final     Chloride   Date Value Ref Range Status   05/25/2018 103 95 - 110 mmol/L Final     Anion Gap   Date Value Ref Range Status   05/25/2018 15.0 5.0 - 15.0 mmol/L " Final     Creatinine   Date Value Ref Range Status   05/25/2018 2.60 (H) 0.70 - 1.30 mg/dL Final     BUN   Date Value Ref Range Status   05/25/2018 58 (H) 7 - 21 mg/dL Final     BUN/Creatinine Ratio   Date Value Ref Range Status   05/25/2018 22.3 7.0 - 25.0 Final     Calcium   Date Value Ref Range Status   05/25/2018 9.7 8.4 - 10.2 mg/dL Final     eGFR Non  Amer   Date Value Ref Range Status   05/25/2018 23 (L) 42 - 98 mL/min/1.73 Final     Alkaline Phosphatase   Date Value Ref Range Status   05/25/2018 105 38 - 126 U/L Final     Total Protein   Date Value Ref Range Status   05/25/2018 7.4 6.3 - 8.6 g/dL Final     ALT (SGPT)   Date Value Ref Range Status   05/25/2018 33 21 - 72 U/L Final     AST (SGOT)   Date Value Ref Range Status   05/25/2018 20 17 - 59 U/L Final     Total Bilirubin   Date Value Ref Range Status   05/25/2018 0.5 0.2 - 1.3 mg/dL Final     Albumin   Date Value Ref Range Status   05/25/2018 4.50 3.40 - 4.80 g/dL Final     Globulin   Date Value Ref Range Status   05/25/2018 2.9 2.3 - 3.5 gm/dL Final     A/G Ratio   Date Value Ref Range Status   05/25/2018 1.6 1.1 - 1.8 g/dL Final     Lab Results   Component Value Date    WBC 10.26 (H) 05/25/2018    HGB 12.6 (L) 05/25/2018    HCT 37.8 (L) 05/25/2018    MCV 91.3 05/25/2018     05/25/2018     Lab Results   Component Value Date    NEUTROABS 8.43 05/25/2018    IRON 94 05/25/2018    TIBC 331 05/25/2018    LABIRON 28 05/25/2018    FERRITIN 138.00 05/25/2018    [id number] 775 05/25/2018    FOLATE 11.30 05/25/2018     No results found for: , LABCA2, AFPTM, HCGQUANT, , CHROMGRNA, [id number], CEA, REFLABREPO]        ASSESSMENT AND PLAN:      1.  Chronic normocytic anemia: Most likely iron deficiency secondary to iron saturation of 4%.  Patient also has a contributing factor from chronic kidney disease plus history of severe arthritis and poor circulation.  Patient states he has tried oral iron in the past without any success.  Oral iron  gives him excessive stomach upset and constipation.  Patient received 2 doses of intravenous Feraheme January 4, 2018 in January 11, 2018 without any problem with iron infusion.  His hemoglobin is improved to 12.6.  Iron studies have normalized.  At this point we will monitored with CBC.  We will ask him to return to clinic in 3 months with repeat anemia workup to be done prior to that.  If patient again develops iron deficiency will need repeat gastroenterology workup which was discussed with patient.     2.  Chronic kidney disease stage III; pt states he has a nephrologist that he has seen at [facility]; creat level today is 2.6; advised him to increase his fluid intake and will recheck BMP; if still elevated he may need referral to local nephrologist.     3.  History of prostate cancer status post seed implant.     4.  History of peripheral vascular disease status post femoropopliteal bypass.  Had intervention done recently at [facility] in left lower extremity.     5.  Arthritis; just had cortisone injection for hip pain    6. Leukocytosis; most likely reactive to recent steroid injection. Will monitor with repeat CBC upon next clinic visit.     7. Health maintenance: Patient quit smoking 35 years ago.  Had a colonoscopy and EGD in 2015 in [place].  Has an advanced directive.    Patient's Body mass index is 24.89 kg/m². BMI is within normal parameters. No follow-up required.    This document has been signed by JUAN M Hernandez on May 30, 2018 2:22 PM

## 2018-08-13 ENCOUNTER — LAB (OUTPATIENT)
Dept: LAB | Facility: OTHER | Age: 83
End: 2018-08-13

## 2018-08-13 DIAGNOSIS — I73.9 PERIPHERAL VASCULAR DISEASE (HCC): Chronic | ICD-10-CM

## 2018-08-13 DIAGNOSIS — I87.2 PERIPHERAL VENOUS INSUFFICIENCY: Chronic | ICD-10-CM

## 2018-08-13 DIAGNOSIS — I10 ESSENTIAL HYPERTENSION: Chronic | ICD-10-CM

## 2018-08-13 DIAGNOSIS — K21.9 GASTROESOPHAGEAL REFLUX DISEASE WITHOUT ESOPHAGITIS: Chronic | ICD-10-CM

## 2018-08-13 DIAGNOSIS — N18.30 CHRONIC KIDNEY DISEASE, STAGE 3 (HCC): Chronic | ICD-10-CM

## 2018-08-13 DIAGNOSIS — N18.30 TYPE 2 DIABETES MELLITUS WITH STAGE 3 CHRONIC KIDNEY DISEASE, WITHOUT LONG-TERM CURRENT USE OF INSULIN (HCC): Chronic | ICD-10-CM

## 2018-08-13 DIAGNOSIS — D50.9 IRON DEFICIENCY ANEMIA, UNSPECIFIED IRON DEFICIENCY ANEMIA TYPE: Chronic | ICD-10-CM

## 2018-08-13 DIAGNOSIS — E11.22 TYPE 2 DIABETES MELLITUS WITH STAGE 3 CHRONIC KIDNEY DISEASE, WITHOUT LONG-TERM CURRENT USE OF INSULIN (HCC): Chronic | ICD-10-CM

## 2018-08-13 DIAGNOSIS — C61 MALIGNANT TUMOR OF PROSTATE (HCC): Chronic | ICD-10-CM

## 2018-08-13 DIAGNOSIS — E78.2 MIXED HYPERLIPIDEMIA: Chronic | ICD-10-CM

## 2018-08-13 DIAGNOSIS — I73.9 CLAUDICATION (HCC): Chronic | ICD-10-CM

## 2018-08-13 DIAGNOSIS — I25.10 CORONARY ARTERIOSCLEROSIS: Chronic | ICD-10-CM

## 2018-08-13 LAB
25(OH)D3 SERPL-MCNC: 48.4 NG/ML (ref 30–100)
ALBUMIN SERPL-MCNC: 4.2 G/DL (ref 3.5–5)
ALBUMIN/GLOB SERPL: 1.5 G/DL (ref 1.1–1.8)
ALP SERPL-CCNC: 107 U/L (ref 38–126)
ALT SERPL W P-5'-P-CCNC: 16 U/L
ANION GAP SERPL CALCULATED.3IONS-SCNC: 10 MMOL/L (ref 5–15)
ARTICHOKE IGE QN: 44 MG/DL (ref 1–129)
AST SERPL-CCNC: 22 U/L (ref 17–59)
BASOPHILS # BLD AUTO: 0.06 10*3/MM3 (ref 0–0.2)
BASOPHILS NFR BLD AUTO: 0.7 % (ref 0–2)
BILIRUB SERPL-MCNC: 0.4 MG/DL (ref 0.2–1.3)
BUN BLD-MCNC: 54 MG/DL (ref 9–20)
BUN/CREAT SERPL: 23 (ref 7–25)
CALCIUM SPEC-SCNC: 9.5 MG/DL (ref 8.4–10.2)
CHLORIDE SERPL-SCNC: 107 MMOL/L (ref 98–107)
CO2 SERPL-SCNC: 25 MMOL/L (ref 22–30)
CREAT BLD-MCNC: 2.35 MG/DL (ref 0.66–1.25)
DEPRECATED RDW RBC AUTO: 48.9 FL (ref 35.1–43.9)
EOSINOPHIL # BLD AUTO: 0.3 10*3/MM3 (ref 0–0.7)
EOSINOPHIL NFR BLD AUTO: 3.4 % (ref 0–7)
ERYTHROCYTE [DISTWIDTH] IN BLOOD BY AUTOMATED COUNT: 14.4 % (ref 11.5–14.5)
FERRITIN SERPL-MCNC: 72.9 NG/ML (ref 17.9–464)
GFR SERPL CREATININE-BSD FRML MDRD: 26 ML/MIN/1.73 (ref 42–98)
GLOBULIN UR ELPH-MCNC: 2.8 GM/DL (ref 2.3–3.5)
GLUCOSE BLD-MCNC: 127 MG/DL (ref 74–99)
HBA1C MFR BLD: 5.7 % (ref 4–5.6)
HCT VFR BLD AUTO: 36.5 % (ref 39–49)
HGB BLD-MCNC: 11.6 G/DL (ref 13.7–17.3)
IRON 24H UR-MRATE: 49 MCG/DL (ref 49–181)
IRON SATN MFR SERPL: 15 % (ref 20–55)
LYMPHOCYTES # BLD AUTO: 1.73 10*3/MM3 (ref 0.6–4.2)
LYMPHOCYTES NFR BLD AUTO: 19.4 % (ref 10–50)
MCH RBC QN AUTO: 30.9 PG (ref 26.5–34)
MCHC RBC AUTO-ENTMCNC: 31.8 G/DL (ref 31.5–36.3)
MCV RBC AUTO: 97.1 FL (ref 80–98)
MONOCYTES # BLD AUTO: 0.71 10*3/MM3 (ref 0–0.9)
MONOCYTES NFR BLD AUTO: 8 % (ref 0–12)
NEUTROPHILS # BLD AUTO: 6.1 10*3/MM3 (ref 2–8.6)
NEUTROPHILS NFR BLD AUTO: 68.5 % (ref 37–80)
PLATELET # BLD AUTO: 211 10*3/MM3 (ref 150–450)
PMV BLD AUTO: 10.4 FL (ref 8–12)
POTASSIUM BLD-SCNC: 4.3 MMOL/L (ref 3.4–5)
PROT SERPL-MCNC: 7 G/DL (ref 6.3–8.2)
PSA SERPL-MCNC: 0.11 NG/ML (ref 0–4)
RBC # BLD AUTO: 3.76 10*6/MM3 (ref 4.37–5.74)
SODIUM BLD-SCNC: 142 MMOL/L (ref 137–145)
TIBC SERPL-MCNC: 324 MCG/DL (ref 261–462)
TSH SERPL DL<=0.05 MIU/L-ACNC: 1.29 MIU/ML (ref 0.46–4.68)
WBC NRBC COR # BLD: 8.9 10*3/MM3 (ref 3.2–9.8)

## 2018-08-13 PROCEDURE — 36415 COLL VENOUS BLD VENIPUNCTURE: CPT | Performed by: INTERNAL MEDICINE

## 2018-08-13 PROCEDURE — 84153 ASSAY OF PSA TOTAL: CPT | Performed by: INTERNAL MEDICINE

## 2018-08-13 PROCEDURE — 82728 ASSAY OF FERRITIN: CPT | Performed by: INTERNAL MEDICINE

## 2018-08-13 PROCEDURE — 83721 ASSAY OF BLOOD LIPOPROTEIN: CPT | Performed by: INTERNAL MEDICINE

## 2018-08-13 PROCEDURE — 85025 COMPLETE CBC W/AUTO DIFF WBC: CPT | Performed by: INTERNAL MEDICINE

## 2018-08-13 PROCEDURE — 84443 ASSAY THYROID STIM HORMONE: CPT | Performed by: INTERNAL MEDICINE

## 2018-08-13 PROCEDURE — 83540 ASSAY OF IRON: CPT | Performed by: INTERNAL MEDICINE

## 2018-08-13 PROCEDURE — 80053 COMPREHEN METABOLIC PANEL: CPT | Performed by: INTERNAL MEDICINE

## 2018-08-13 PROCEDURE — 83036 HEMOGLOBIN GLYCOSYLATED A1C: CPT | Performed by: INTERNAL MEDICINE

## 2018-08-13 PROCEDURE — 83550 IRON BINDING TEST: CPT | Performed by: INTERNAL MEDICINE

## 2018-08-13 PROCEDURE — 82306 VITAMIN D 25 HYDROXY: CPT | Performed by: INTERNAL MEDICINE

## 2018-08-17 ENCOUNTER — OFFICE VISIT (OUTPATIENT)
Dept: FAMILY MEDICINE CLINIC | Facility: CLINIC | Age: 83
End: 2018-08-17

## 2018-08-17 VITALS
TEMPERATURE: 97.6 F | WEIGHT: 167.2 LBS | HEART RATE: 52 BPM | SYSTOLIC BLOOD PRESSURE: 136 MMHG | DIASTOLIC BLOOD PRESSURE: 60 MMHG | HEIGHT: 69 IN | BODY MASS INDEX: 24.76 KG/M2

## 2018-08-17 DIAGNOSIS — I73.9 CLAUDICATION (HCC): Chronic | ICD-10-CM

## 2018-08-17 DIAGNOSIS — N18.30 TYPE 2 DIABETES MELLITUS WITH STAGE 3 CHRONIC KIDNEY DISEASE, WITHOUT LONG-TERM CURRENT USE OF INSULIN (HCC): Chronic | ICD-10-CM

## 2018-08-17 DIAGNOSIS — I87.2 PERIPHERAL VENOUS INSUFFICIENCY: Chronic | ICD-10-CM

## 2018-08-17 DIAGNOSIS — I25.10 CORONARY ARTERIOSCLEROSIS: Chronic | ICD-10-CM

## 2018-08-17 DIAGNOSIS — K21.9 GASTROESOPHAGEAL REFLUX DISEASE WITHOUT ESOPHAGITIS: Chronic | ICD-10-CM

## 2018-08-17 DIAGNOSIS — E11.22 TYPE 2 DIABETES MELLITUS WITH STAGE 3 CHRONIC KIDNEY DISEASE, WITHOUT LONG-TERM CURRENT USE OF INSULIN (HCC): Chronic | ICD-10-CM

## 2018-08-17 DIAGNOSIS — N18.30 CHRONIC KIDNEY DISEASE, STAGE 3 (HCC): Chronic | ICD-10-CM

## 2018-08-17 DIAGNOSIS — I73.9 PERIPHERAL VASCULAR DISEASE (HCC): Chronic | ICD-10-CM

## 2018-08-17 DIAGNOSIS — E78.2 MIXED HYPERLIPIDEMIA: Primary | Chronic | ICD-10-CM

## 2018-08-17 DIAGNOSIS — I10 ESSENTIAL HYPERTENSION: Chronic | ICD-10-CM

## 2018-08-17 DIAGNOSIS — D50.9 IRON DEFICIENCY ANEMIA, UNSPECIFIED IRON DEFICIENCY ANEMIA TYPE: Chronic | ICD-10-CM

## 2018-08-17 DIAGNOSIS — C61 MALIGNANT TUMOR OF PROSTATE (HCC): Chronic | ICD-10-CM

## 2018-08-17 DIAGNOSIS — K92.1 MELENA: ICD-10-CM

## 2018-08-17 PROCEDURE — 99214 OFFICE O/P EST MOD 30 MIN: CPT | Performed by: INTERNAL MEDICINE

## 2018-08-17 RX ORDER — GABAPENTIN 600 MG/1
TABLET ORAL
Qty: 60 TABLET | Refills: 5 | Status: SHIPPED | OUTPATIENT
Start: 2018-08-17 | End: 2019-01-23 | Stop reason: SDUPTHER

## 2018-08-17 RX ORDER — ALBUTEROL SULFATE 90 UG/1
2 AEROSOL, METERED RESPIRATORY (INHALATION) EVERY 4 HOURS PRN
Qty: 1 INHALER | Refills: 11 | Status: SHIPPED | OUTPATIENT
Start: 2018-08-17 | End: 2019-12-02 | Stop reason: SDUPTHER

## 2018-08-17 NOTE — PROGRESS NOTES
Subjective     History of Present Illness     Sy Florez is a 89 y.o. male who presents for 6-month follow up of CKD, CAD, PVD, PAD, hypertension, high cholesterol, iron deficiency anemia, and diet-controlled type 2 diabetes, among other issues.  He continues on 5 mg of prednisone daily.  He denies recent flares of his autoimmune skin disease.  GERD symptoms adequately controlled with Prilosec.  He continues to follow with Dr. Mccord for iron deficiency anemia. Patient received 2 dose of intravenous Feraheme last of which was on January 11, 2018.  Iron is drifting down.      Last summer, he underwent left lower extremity bypass x 2 below the left knee of the left lower extremity by vascular surgeon at [facility] for his PAD.  Attempts at angioplasty were unsuccessful. He continues to have claudication symptoms with rest, which improves with activity.  His claudication has strong components of both vascular and neurogenic claudication.    He reports episodic cough productive of yellow sputum.  Exam reveals wheezing on expiratory phase.  We will send a prescription for an albuterol inhaler.       When he was here six months ago, I had him start Crestor three days weekly.  LDL decreased from 214 to 44 revealing remarkable improvement.  He is tolerating this amount of Crestor without significant myalgia symptoms.    Hemoglobin has drifted down from 12.6 to 11.6.  Denies evidence of GI blood loss   Last week he had diarrhea, which resolved by Saturday.  He reports dark stool for a few days until Wednesday when this resolved. He denies abdominal pain.  He is up to date on colonoscopy.  We will continue to monitor and he will let us know if he has recurrence of dark stool or other GI symptoms.  Dr. Mccord had successfully given him IV iron therapy last year.  We will continue to follow this closely.    Weight is down 2 pounds in the past six months.    The patient's relevant past medical, surgical,  "and social history was reviewed in Epic.   Lab results are reviewed with the patient today.  CBC reveals hemoglobin 11.6 as above.  Fasting glucose 127.  A1c is 5.7.  Vitamin D at goal.  Thyroid at goal.  Renal function at his baseline.  Liver function normal.      Review of Systems   Constitutional: Negative for chills, fatigue and fever.   HENT: Negative for congestion, ear pain, postnasal drip, sinus pressure and sore throat.    Respiratory: Negative for cough, shortness of breath and wheezing.    Cardiovascular: Negative for chest pain, palpitations and leg swelling.   Gastrointestinal: Negative for abdominal pain, blood in stool, constipation, diarrhea, nausea and vomiting.   Endocrine: Negative for cold intolerance, heat intolerance, polydipsia and polyuria.   Genitourinary: Negative for dysuria, frequency, hematuria and urgency.   Skin: Negative for rash.   Neurological: Negative for syncope and weakness.        Objective      Vitals:    08/17/18 1051   BP: 136/60   Pulse: 52   Temp: 97.6 °F (36.4 °C)   TempSrc: Oral   Weight: 75.8 kg (167 lb 3.2 oz)   Height: 175.3 cm (69\")       Physical Exam   Constitutional: He is oriented to person, place, and time. He appears well-developed and well-nourished. No distress.   HENT:   Head: Normocephalic and atraumatic.   Nose: Right sinus exhibits no maxillary sinus tenderness and no frontal sinus tenderness. Left sinus exhibits no maxillary sinus tenderness and no frontal sinus tenderness.   Mouth/Throat: Uvula is midline, oropharynx is clear and moist and mucous membranes are normal. No oral lesions. No tonsillar exudate.   Clear postnasal drip.    Eyes: Pupils are equal, round, and reactive to light. Conjunctivae and EOM are normal.   Neck: Trachea normal. Neck supple. No JVD present. Carotid bruit is not present. No tracheal deviation present. No thyroid mass and no thyromegaly present.   Cardiovascular: Normal rate, regular rhythm, normal heart sounds and intact " distal pulses.   No extrasystoles are present. PMI is not displaced.    No murmur heard.  Pulmonary/Chest: Effort normal. No accessory muscle usage. No respiratory distress. He has no decreased breath sounds. He has wheezes. He has no rhonchi. He has no rales.   Wheezes on expiratory phase.    Abdominal: Soft. Bowel sounds are normal. He exhibits no distension. There is no hepatosplenomegaly. There is no tenderness.     Vascular Status -  His right foot exhibits abnormal foot vasculature  and abnormal foot edema (1+ edema bilateral lower extremities with diminished pulses bilateral ankles. ). His left foot exhibits abnormal foot vasculature  and abnormal foot edema.  Lymphadenopathy:     He has no cervical adenopathy.   Neurological: He is alert and oriented to person, place, and time. No cranial nerve deficit. Coordination normal.   Skin: Skin is warm, dry and intact. No rash noted. No cyanosis. Nails show no clubbing.   Psychiatric: He has a normal mood and affect. His speech is normal and behavior is normal. Judgment and thought content normal.   Vitals reviewed.        Assessment/Plan      A prescription is sent for Albuterol inhaler two puffs up to q.i.d.     Continue Crestor 10 mg three times a week.  He can take interval breaks if he starts to have increased myalgias.  Continue the vascular risk factor modifications.     Continue omeprazole for GERD.    Continue low dose prednisone 5 mg daily    We continue to avoid NSAIDs and other nephrotoxic drugs due to CKD.     Continue the current dose of Norvasc and altace.    I asked him to notify me if he sees any recurrence of dark stool or other evidence of GI blood loss.    Return in six months for follow up with fasting labs one week prior.     Scribed for Dr. Rodirguez by Johanna Reyes [facility].     Diagnoses and all orders for this visit:    Mixed hyperlipidemia  -     CBC Auto Differential; Future  -     Comprehensive Metabolic Panel; Future  -     Lipid Panel;  Future    Peripheral vascular disease (CMS/HCC)    Peripheral venous insufficiency    Essential hypertension    Coronary arteriosclerosis    Gastroesophageal reflux disease without esophagitis    Type 2 diabetes mellitus with stage 3 chronic kidney disease, without long-term current use of insulin (CMS/[facility])  -     Hemoglobin A1c; Future  -     Microalbumin / Creatinine Urine Ratio - Urine, Clean Catch; Future    Claudication (CMS/HCC)    Malignant tumor of prostate (CMS/[facility])  -     PSA DIAGNOSTIC; Future    Chronic kidney disease, stage 3    Iron deficiency anemia, unspecified iron deficiency anemia type  -     Ferritin; Future  -     Iron Profile; Future  -     Vitamin B12; Future  -     Folate; Future    Melena    Other orders  -     gabapentin (NEURONTIN) 600 MG tablet; Take one tab twice daily  -     albuterol (PROVENTIL HFA;VENTOLIN HFA) 108 (90 Base) MCG/ACT inhaler; Inhale 2 puffs Every 4 (Four) Hours As Needed for Wheezing.        Lab on 08/13/2018   Component Date Value Ref Range Status   • WBC 08/13/2018 8.90  3.20 - 9.80 10*3/mm3 Final   • RBC 08/13/2018 3.76* 4.37 - 5.74 10*6/mm3 Final   • Hemoglobin 08/13/2018 11.6* 13.7 - 17.3 g/dL Final   • Hematocrit 08/13/2018 36.5* 39.0 - 49.0 % Final   • MCV 08/13/2018 97.1  80.0 - 98.0 fL Final   • MCH 08/13/2018 30.9  26.5 - 34.0 pg Final   • MCHC 08/13/2018 31.8  31.5 - 36.3 g/dL Final   • RDW 08/13/2018 14.4  11.5 - 14.5 % Final   • RDW-SD 08/13/2018 48.9* 35.1 - 43.9 fl Final   • MPV 08/13/2018 10.4  8.0 - 12.0 fL Final   • Platelets 08/13/2018 211  150 - 450 10*3/mm3 Final   • Neutrophil % 08/13/2018 68.5  37.0 - 80.0 % Final   • Lymphocyte % 08/13/2018 19.4  10.0 - 50.0 % Final   • Monocyte % 08/13/2018 8.0  0.0 - 12.0 % Final   • Eosinophil % 08/13/2018 3.4  0.0 - 7.0 % Final   • Basophil % 08/13/2018 0.7  0.0 - 2.0 % Final   • Neutrophils, Absolute 08/13/2018 6.10  2.00 - 8.60 10*3/mm3 Final   • Lymphocytes, Absolute 08/13/2018 1.73  0.60 - 4.20 10*3/mm3  Final   • Monocytes, Absolute 08/13/2018 0.71  0.00 - 0.90 10*3/mm3 Final   • Eosinophils, Absolute 08/13/2018 0.30  0.00 - 0.70 10*3/mm3 Final   • Basophils, Absolute 08/13/2018 0.06  0.00 - 0.20 10*3/mm3 Final   • Glucose 08/13/2018 127* 74 - 99 mg/dL Final   • BUN 08/13/2018 54* 9 - 20 mg/dL Final   • Creatinine 08/13/2018 2.35* 0.66 - 1.25 mg/dL Final   • Sodium 08/13/2018 142  137 - 145 mmol/L Final   • Potassium 08/13/2018 4.3  3.4 - 5.0 mmol/L Final   • Chloride 08/13/2018 107  98 - 107 mmol/L Final   • CO2 08/13/2018 25.0  22.0 - 30.0 mmol/L Final   • Calcium 08/13/2018 9.5  8.4 - 10.2 mg/dL Final   • Total Protein 08/13/2018 7.0  6.3 - 8.2 g/dL Final   • Albumin 08/13/2018 4.20  3.50 - 5.00 g/dL Final   • ALT (SGPT) 08/13/2018 16  <=50 U/L Final   • AST (SGOT) 08/13/2018 22  17 - 59 U/L Final   • Alkaline Phosphatase 08/13/2018 107  38 - 126 U/L Final   • Total Bilirubin 08/13/2018 0.4  0.2 - 1.3 mg/dL Final   • eGFR Non African Amer 08/13/2018 26* 42 - 98 mL/min/1.73 Final   • Globulin 08/13/2018 2.8  2.3 - 3.5 gm/dL Final   • A/G Ratio 08/13/2018 1.5  1.1 - 1.8 g/dL Final   • BUN/Creatinine Ratio 08/13/2018 23.0  7.0 - 25.0 Final   • Anion Gap 08/13/2018 10.0  5.0 - 15.0 mmol/L Final   • Ferritin 08/13/2018 72.90  17.90 - 464.00 ng/mL Final   • Iron 08/13/2018 49  49 - 181 mcg/dL Final   • TIBC 08/13/2018 324  261 - 462 mcg/dL Final   • Iron Saturation 08/13/2018 15* 20 - 55 % Final   • Hemoglobin A1C 08/13/2018 5.7* 4 - 5.6 % Final   • LDL Cholesterol  08/13/2018 44  1 - 129 mg/dL Final   • PSA 08/13/2018 0.111  0.000 - 4.000 ng/mL Final   • TSH 08/13/2018 1.290  0.460 - 4.680 mIU/mL Final   • 25 Hydroxy, Vitamin D 08/13/2018 48.4  30.0 - 100.0 ng/ml Final   ]

## 2018-08-21 RX ORDER — RAMIPRIL 5 MG/1
CAPSULE ORAL
Qty: 30 CAPSULE | Refills: 5 | Status: SHIPPED | OUTPATIENT
Start: 2018-08-21 | End: 2018-10-23 | Stop reason: SDUPTHER

## 2018-09-12 ENCOUNTER — OFFICE VISIT (OUTPATIENT)
Dept: ONCOLOGY | Facility: CLINIC | Age: 83
End: 2018-09-12

## 2018-09-12 VITALS
TEMPERATURE: 98.2 F | SYSTOLIC BLOOD PRESSURE: 127 MMHG | OXYGEN SATURATION: 96 % | HEIGHT: 69 IN | DIASTOLIC BLOOD PRESSURE: 62 MMHG | BODY MASS INDEX: 24.85 KG/M2 | RESPIRATION RATE: 16 BRPM | WEIGHT: 167.8 LBS | HEART RATE: 53 BPM

## 2018-09-12 DIAGNOSIS — K92.1 HEMATOCHEZIA: Primary | ICD-10-CM

## 2018-09-12 PROCEDURE — 99214 OFFICE O/P EST MOD 30 MIN: CPT | Performed by: NURSE PRACTITIONER

## 2018-09-12 PROCEDURE — G0463 HOSPITAL OUTPT CLINIC VISIT: HCPCS | Performed by: NURSE PRACTITIONER

## 2018-09-12 RX ORDER — SODIUM CHLORIDE 9 MG/ML
250 INJECTION, SOLUTION INTRAVENOUS ONCE
Status: CANCELLED | OUTPATIENT
Start: 2018-09-12

## 2018-09-12 RX ORDER — DIPHENHYDRAMINE HCL 25 MG
25 CAPSULE ORAL ONCE
Status: CANCELLED | OUTPATIENT
Start: 2018-09-12

## 2018-09-12 RX ORDER — FAMOTIDINE 10 MG/ML
20 INJECTION, SOLUTION INTRAVENOUS ONCE
Status: CANCELLED | OUTPATIENT
Start: 2018-09-12

## 2018-09-12 NOTE — PROGRESS NOTES
DATE OF VISIT: 9/12/2018    REASON FOR VISIT:  Follow-up for iron deficiency anemia; pt unable to tolerate PO iron     HISTORY OF PRESENT ILLNESS:  89-year-old male with a past medical history significant for history of peripheral vascular disease status post femoropopliteal bypass, prostate cancer status post seed in 2001, history of coronary artery disease status post CABG was initially seen in consultation on December 20, 2017 for evaluation of iron deficiency anemia and not able to tolerate borderline.  Subsequently patient received 2 dose of intravenous Feraheme last of which was on January 11, 2018. His iron level improved.     Patient is here for follow-up visit today. Patient states over past 6-8 wks he has random episodes of black stools. He states the last episode was probably about 2 weeks ago. Denies any abdominal pain, no nausea or vomiting or diarrhea or constipation.       PAST MEDICAL HISTORY:    Past Medical History:   Diagnosis Date   • Actinic keratosis    • Acute prostatitis     h/o 2014    • Anemia      New problem noted with preoperative labs, 10/2015.. Anemia workup initiated, 10/2015      • Arthritis      Possible inflammatory arthritis bilateral knees   • Bursitis of hip     right greater trochanteric bursitis, spring 2014   • Cellulitis of lower limb     mild bilateral lower extremities   • Chronic kidney disease, stage 3     - cr =1.8-2.0, gradually worsening   • Claudication (CMS/[facility])     dr. you s/p numerous revascularization. medical management   • Coronary arteriosclerosis     dr. perez   • Degenerative joint disease involving multiple joints    • Disorder of lumbar spine     Lumbar laminectomy at [facility], 11/4/2015      • Dyslipidemia     Resumed statin therapy 1/2015. Lipitor   • Edema of lower extremity      Etiology undetermined. Chronic venous insufficiency is playing a role   • Essential hypertension    • Eustachian tube disorder    • Gastroesophageal reflux  disease    • Hyperlipidemia     Resumed extended interval Lipitor, 7/2015   • Idiopathic peripheral neuropathy     Gradually increasing Neurontin. Referred to Dr. Vergara, neurologist, 4/2015      • IFG (impaired fasting glucose)    • Iron deficiency anemia      New problem noted with preoperative labs, 10/2015.. Anemia workup initiated, 10/2015      • Leg cramp     nocturnal leg cramps   • Malaise and fatigue    • Malignant tumor of prostate (CMS/HCC)     6/2001 s/p brachytherapy   • Peripheral vascular disease (CMS/HCC)     PAD/claudication. Dr. Brennan      • Peripheral venous insufficiency     Chronic venous insufficiency bilateral lower extremities, left worse than right      • Pneumonia     H/O   • Pruritic rash     Autoimmune. Responds to methotrexate. Dr. Oliveros, dermatology. The patient stopped the methotrexate and declines resumption. Started low-dose prednisone, 4/2016      • Type 2 diabetes mellitus (CMS/HCC)     Newly diagnosed, 8/2015. Started Amaryl, 8/2015. Metformin and pioglitizone contraindicated   • Type 2 diabetes mellitus with chronic kidney disease, without long-term current use of insulin (CMS/HCC)     Newly diagnosed, 8/2015. Started Amaryl, 8/2015. Metformin and pioglitizone contraindicated   • UTI (urinary tract infection)     H/O,site not specified-possible       SOCIAL HISTORY:    Social History   Substance Use Topics   • Smoking status: Former Smoker     Types: Cigarettes   • Smokeless tobacco: Never Used      Comment: 40 years ago   • Alcohol use No       Surgical History :  Past Surgical History:   Procedure Laterality Date   • CORONARY ARTERY BYPASS GRAFT      vein,two  09/2006   • ENDOSCOPY AND COLONOSCOPY      7/2011 declines   • OTHER SURGICAL HISTORY      femoral-popliteal bypass graft - right leg 1997/left leg 2007   • OTHER SURGICAL HISTORY      laser surgery of prostate, - radiation implant;  Last Performed: 06/2001   • OTHER SURGICAL HISTORY      low back disk surgery , -  "Lumbar laminectomy with L4/L5 mechanical fusion;  Last Performed: 11/2015   • OTHER SURGICAL HISTORY      remove impacted cerumen 4/06/16       ALLERGIES:    Allergies   Allergen Reactions   • Bacitracin Rash   • Formaldehyde Rash   • Framycetin Rash     All Fragrances   • Neomycin Rash   • Nickel Rash       REVIEW OF SYSTEMS:      CONSTITUTIONAL:  No fever, chills, or night sweats.     HEENT:  No epistaxis, mouth sores, or difficulty swallowing.    RESPIRATORY:  No new shortness of breath or cough at present.    CARDIOVASCULAR:  No chest pain or palpitations.    GASTROINTESTINAL:  No abdominal pain, nausea, vomiting,; positive for intermittent black stools.    GENITOURINARY:  No dysuria or hematuria.    MUSCULOSKELETAL:  No any new back pain or arthralgias.     NEUROLOGICAL:  No tingling or numbness. No new headache or dizziness.     LYMPHATICS:  Denies any abnormal swollen and anywhere in the body.    SKIN:  Denies any new skin rash.    PHYSICAL EXAMINATION:      VITAL SIGNS:  /62   Pulse 53   Temp 98.2 °F (36.8 °C) (Temporal Artery )   Resp 16   Ht 175.3 cm (69.02\")   Wt 76.1 kg (167 lb 12.8 oz)   SpO2 96%   BMI 24.77 kg/m²     GENERAL:  Not in any distress.    HEENT:  Normocephalic, Atraumatic.Mild Conjunctival pallor. No icterus.  No Facial Asymmetry noted.    NECK:  No adenopathy. No JVD.    RESPIRATORY:  Fair air entry bilateral. No rhonchi or wheezing.    CARDIOVASCULAR:  S1, S2. Regular rate and rhythm. No murmur or gallop appreciated.    ABDOMEN:  Soft, obese, nontender. Bowel sounds present in all four quadrants.  No organomegaly appreciated.    EXTREMITIES:  No edema.No Calf Tenderness.    NEUROLOGIC:  Alert, awake and oriented ×3.    SKIN : No new skin lesion identified  DIAGNOSTIC DATA:    Glucose   Date Value Ref Range Status   08/13/2018 127 (H) 74 - 99 mg/dL Final     Sodium   Date Value Ref Range Status   08/13/2018 142 137 - 145 mmol/L Final     Potassium   Date Value Ref Range Status "   08/13/2018 4.3 3.4 - 5.0 mmol/L Final     CO2   Date Value Ref Range Status   08/13/2018 25.0 22.0 - 30.0 mmol/L Final     Chloride   Date Value Ref Range Status   08/13/2018 107 98 - 107 mmol/L Final     Anion Gap   Date Value Ref Range Status   08/13/2018 10.0 5.0 - 15.0 mmol/L Final     Creatinine   Date Value Ref Range Status   08/13/2018 2.35 (H) 0.66 - 1.25 mg/dL Final     BUN   Date Value Ref Range Status   08/13/2018 54 (H) 9 - 20 mg/dL Final     BUN/Creatinine Ratio   Date Value Ref Range Status   08/13/2018 23.0 7.0 - 25.0 Final     Calcium   Date Value Ref Range Status   08/13/2018 9.5 8.4 - 10.2 mg/dL Final     eGFR Non  Amer   Date Value Ref Range Status   08/13/2018 26 (L) 42 - 98 mL/min/1.73 Final     Alkaline Phosphatase   Date Value Ref Range Status   08/13/2018 107 38 - 126 U/L Final     Total Protein   Date Value Ref Range Status   08/13/2018 7.0 6.3 - 8.2 g/dL Final     ALT (SGPT)   Date Value Ref Range Status   08/13/2018 16 <=50 U/L Final     AST (SGOT)   Date Value Ref Range Status   08/13/2018 22 17 - 59 U/L Final     Total Bilirubin   Date Value Ref Range Status   08/13/2018 0.4 0.2 - 1.3 mg/dL Final     Albumin   Date Value Ref Range Status   08/13/2018 4.20 3.50 - 5.00 g/dL Final     Globulin   Date Value Ref Range Status   08/13/2018 2.8 2.3 - 3.5 gm/dL Final     Lab Results   Component Value Date    WBC 8.90 08/13/2018    HGB 11.6 (L) 08/13/2018    HCT 36.5 (L) 08/13/2018    MCV 97.1 08/13/2018     08/13/2018     Lab Results   Component Value Date    NEUTROABS 6.10 08/13/2018    IRON 49 08/13/2018    TIBC 324 08/13/2018    LABIRON 15 (L) 08/13/2018    FERRITIN 72.90 08/13/2018    [id number] 775 05/25/2018    FOLATE 11.30 05/25/2018     No results found for: , LABCA2, AFPTM, HCGQUANT, , CHROMGRNA, [id number], CEA, REFLABREPO]        ASSESSMENT AND PLAN:      1. Iron deficiency anemia secondary to possible GI bleed; He received IV iron back in January 2018 due to  his inability to tolerate PO iron; his iron levels improved; recently they have started dropping again and he has been having intermittent black stool. I suspect he may have intermittent GI bleed; will place order for IV Feraheme and order for stool occult testing if positive will refer him back to his GI provider. Will recheck iron studies in 6-8 wks after his iron infusion.     2.  Chronic kidney disease stage III; pt states he has a nephrologist that he has seen at [facility]; creat level today is improved to 2.3     3.  History of prostate cancer status post seed implant.Being followed by different provider     4.  History of peripheral vascular disease status post femoropopliteal bypass.  Had intervention done recently at [facility] in left lower extremity.         This document has been signed by JUAN M Hernandez on September 12, 2018 2:01 PM

## 2018-09-12 NOTE — PATIENT INSTRUCTIONS
Ferumoxytol injection  What is this medicine?  FERUMOXYTOL is an iron complex. Iron is used to make healthy red blood cells, which carry oxygen and nutrients throughout the body. This medicine is used to treat iron deficiency anemia in people with chronic kidney disease.  This medicine may be used for other purposes; ask your health care provider or pharmacist if you have questions.  COMMON BRAND NAME(S): Feraheme  What should I tell my health care provider before I take this medicine?  They need to know if you have any of these conditions:  -anemia not caused by low iron levels  -high levels of iron in the blood  -magnetic resonance imaging (MRI) test scheduled  -an unusual or allergic reaction to iron, other medicines, foods, dyes, or preservatives  -pregnant or trying to get pregnant  -breast-feeding  How should I use this medicine?  This medicine is for injection into a vein. It is given by a health care professional in a hospital or clinic setting.  Talk to your pediatrician regarding the use of this medicine in children. Special care may be needed.  Overdosage: If you think you have taken too much of this medicine contact a poison control center or emergency room at once.  NOTE: This medicine is only for you. Do not share this medicine with others.  What if I miss a dose?  It is important not to miss your dose. Call your doctor or health care professional if you are unable to keep an appointment.  What may interact with this medicine?  This medicine may interact with the following medications:  -other iron products  This list may not describe all possible interactions. Give your health care provider a list of all the medicines, herbs, non-prescription drugs, or dietary supplements you use. Also tell them if you smoke, drink alcohol, or use illegal drugs. Some items may interact with your medicine.  What should I watch for while using this medicine?  Visit your doctor or healthcare professional regularly. Tell  your doctor or healthcare professional if your symptoms do not start to get better or if they get worse. You may need blood work done while you are taking this medicine.  You may need to follow a special diet. Talk to your doctor. Foods that contain iron include: whole grains/cereals, dried fruits, beans, or peas, leafy green vegetables, and organ meats (liver, kidney).  What side effects may I notice from receiving this medicine?  Side effects that you should report to your doctor or health care professional as soon as possible:  -allergic reactions like skin rash, itching or hives, swelling of the face, lips, or tongue  -breathing problems  -changes in blood pressure  -feeling faint or lightheaded, falls  -fever or chills  -flushing, sweating, or hot feelings  -swelling of the ankles or feet  Side effects that usually do not require medical attention (report to your doctor or health care professional if they continue or are bothersome):  -diarrhea  -headache  -nausea, vomiting  -stomach pain  This list may not describe all possible side effects. Call your doctor for medical advice about side effects. You may report side effects to FDA at 3-274-FDA-0272.  Where should I keep my medicine?  This drug is given in a hospital or clinic and will not be stored at home.  NOTE: This sheet is a summary. It may not cover all possible information. If you have questions about this medicine, talk to your doctor, pharmacist, or health care provider.  © 2018 [employer] (2017-01-19 12:41:49)

## 2018-09-13 ENCOUNTER — TELEPHONE (OUTPATIENT)
Dept: ONCOLOGY | Facility: HOSPITAL | Age: 83
End: 2018-09-13

## 2018-09-13 ENCOUNTER — LAB (OUTPATIENT)
Dept: LAB | Facility: OTHER | Age: 83
End: 2018-09-13

## 2018-09-13 DIAGNOSIS — K92.1 HEMATOCHEZIA: Primary | ICD-10-CM

## 2018-09-13 LAB — HEMOCCULT STL QL IA: NEGATIVE

## 2018-09-13 PROCEDURE — 82274 ASSAY TEST FOR BLOOD FECAL: CPT | Performed by: NURSE PRACTITIONER

## 2018-09-13 NOTE — PROGRESS NOTES
Let him know that stool occult testing was negative but since his iron levels dropped again he needs to his GI doctor    ThanksTrarolo

## 2018-09-13 NOTE — TELEPHONE ENCOUNTER
----- Message from JUAN M Hernandez sent at 9/13/2018  1:42 PM CDT -----  Let him know that stool occult testing was negative but since his iron levels dropped again he needs to his GI doctor    ThanksChar

## 2018-09-13 NOTE — TELEPHONE ENCOUNTER
Called and informed pt. Pt states that he doesn't have a GI doctor and he refuses to be referred to a GI doctor.

## 2018-09-20 ENCOUNTER — INFUSION (OUTPATIENT)
Dept: ONCOLOGY | Facility: HOSPITAL | Age: 83
End: 2018-09-20

## 2018-09-20 VITALS
RESPIRATION RATE: 18 BRPM | HEART RATE: 61 BPM | DIASTOLIC BLOOD PRESSURE: 87 MMHG | TEMPERATURE: 98.7 F | SYSTOLIC BLOOD PRESSURE: 126 MMHG

## 2018-09-20 DIAGNOSIS — D50.0 IRON DEFICIENCY ANEMIA DUE TO CHRONIC BLOOD LOSS: Primary | ICD-10-CM

## 2018-09-20 PROCEDURE — 96375 TX/PRO/DX INJ NEW DRUG ADDON: CPT | Performed by: NURSE PRACTITIONER

## 2018-09-20 PROCEDURE — 25010000002 FERUMOXYTOL 510 MG/17ML SOLUTION 510 MG VIAL: Performed by: NURSE PRACTITIONER

## 2018-09-20 PROCEDURE — 63710000001 DIPHENHYDRAMINE PER 50 MG: Performed by: NURSE PRACTITIONER

## 2018-09-20 PROCEDURE — 96374 THER/PROPH/DIAG INJ IV PUSH: CPT | Performed by: NURSE PRACTITIONER

## 2018-09-20 RX ORDER — FAMOTIDINE 10 MG/ML
20 INJECTION, SOLUTION INTRAVENOUS ONCE
Status: CANCELLED | OUTPATIENT
Start: 2018-09-27 | End: 2018-09-27

## 2018-09-20 RX ORDER — DIPHENHYDRAMINE HCL 25 MG
25 CAPSULE ORAL ONCE
Status: CANCELLED | OUTPATIENT
Start: 2018-09-27 | End: 2018-09-27

## 2018-09-20 RX ORDER — DIPHENHYDRAMINE HCL 25 MG
25 CAPSULE ORAL ONCE
Status: COMPLETED | OUTPATIENT
Start: 2018-09-20 | End: 2018-09-20

## 2018-09-20 RX ORDER — FAMOTIDINE 10 MG/ML
20 INJECTION, SOLUTION INTRAVENOUS ONCE
Status: COMPLETED | OUTPATIENT
Start: 2018-09-20 | End: 2018-09-20

## 2018-09-20 RX ORDER — SODIUM CHLORIDE 9 MG/ML
250 INJECTION, SOLUTION INTRAVENOUS ONCE
Status: CANCELLED | OUTPATIENT
Start: 2018-09-27 | End: 2018-09-27

## 2018-09-20 RX ORDER — SODIUM CHLORIDE 9 MG/ML
250 INJECTION, SOLUTION INTRAVENOUS ONCE
Status: COMPLETED | OUTPATIENT
Start: 2018-09-20 | End: 2018-09-20

## 2018-09-20 RX ADMIN — SODIUM CHLORIDE 250 ML: 9 INJECTION, SOLUTION INTRAVENOUS at 13:32

## 2018-09-20 RX ADMIN — DIPHENHYDRAMINE HYDROCHLORIDE 25 MG: 25 CAPSULE ORAL at 13:30

## 2018-09-20 RX ADMIN — FERUMOXYTOL 510 MG: 510 INJECTION INTRAVENOUS at 13:44

## 2018-09-20 RX ADMIN — FAMOTIDINE 20 MG: 10 INJECTION INTRAVENOUS at 13:30

## 2018-09-27 ENCOUNTER — INFUSION (OUTPATIENT)
Dept: ONCOLOGY | Facility: HOSPITAL | Age: 83
End: 2018-09-27

## 2018-09-27 VITALS
RESPIRATION RATE: 16 BRPM | TEMPERATURE: 97.1 F | DIASTOLIC BLOOD PRESSURE: 61 MMHG | HEART RATE: 52 BPM | SYSTOLIC BLOOD PRESSURE: 124 MMHG

## 2018-09-27 DIAGNOSIS — D50.0 IRON DEFICIENCY ANEMIA DUE TO CHRONIC BLOOD LOSS: Primary | ICD-10-CM

## 2018-09-27 PROCEDURE — 96374 THER/PROPH/DIAG INJ IV PUSH: CPT | Performed by: NURSE PRACTITIONER

## 2018-09-27 PROCEDURE — 96375 TX/PRO/DX INJ NEW DRUG ADDON: CPT | Performed by: NURSE PRACTITIONER

## 2018-09-27 PROCEDURE — 63710000001 DIPHENHYDRAMINE PER 50 MG: Performed by: NURSE PRACTITIONER

## 2018-09-27 PROCEDURE — 25010000002 FERUMOXYTOL 510 MG/17ML SOLUTION 510 MG VIAL: Performed by: NURSE PRACTITIONER

## 2018-09-27 RX ORDER — FAMOTIDINE 10 MG/ML
20 INJECTION, SOLUTION INTRAVENOUS ONCE
Status: COMPLETED | OUTPATIENT
Start: 2018-09-27 | End: 2018-09-27

## 2018-09-27 RX ORDER — DIPHENHYDRAMINE HCL 25 MG
25 CAPSULE ORAL ONCE
Status: CANCELLED | OUTPATIENT
Start: 2018-09-27

## 2018-09-27 RX ORDER — FAMOTIDINE 10 MG/ML
20 INJECTION, SOLUTION INTRAVENOUS ONCE
Status: CANCELLED | OUTPATIENT
Start: 2018-09-27

## 2018-09-27 RX ORDER — SODIUM CHLORIDE 9 MG/ML
250 INJECTION, SOLUTION INTRAVENOUS ONCE
Status: COMPLETED | OUTPATIENT
Start: 2018-09-27 | End: 2018-09-27

## 2018-09-27 RX ORDER — SODIUM CHLORIDE 9 MG/ML
250 INJECTION, SOLUTION INTRAVENOUS ONCE
Status: CANCELLED | OUTPATIENT
Start: 2018-09-27

## 2018-09-27 RX ORDER — DIPHENHYDRAMINE HCL 25 MG
25 CAPSULE ORAL ONCE
Status: COMPLETED | OUTPATIENT
Start: 2018-09-27 | End: 2018-09-27

## 2018-09-27 RX ADMIN — FERUMOXYTOL 510 MG: 510 INJECTION INTRAVENOUS at 13:47

## 2018-09-27 RX ADMIN — SODIUM CHLORIDE 250 ML: 9 INJECTION, SOLUTION INTRAVENOUS at 13:27

## 2018-09-27 RX ADMIN — DIPHENHYDRAMINE HYDROCHLORIDE 25 MG: 25 CAPSULE ORAL at 13:28

## 2018-09-27 RX ADMIN — FAMOTIDINE 20 MG: 10 INJECTION INTRAVENOUS at 13:28

## 2018-10-23 ENCOUNTER — OFFICE VISIT (OUTPATIENT)
Dept: FAMILY MEDICINE CLINIC | Facility: CLINIC | Age: 83
End: 2018-10-23

## 2018-10-23 VITALS
OXYGEN SATURATION: 95 % | SYSTOLIC BLOOD PRESSURE: 138 MMHG | DIASTOLIC BLOOD PRESSURE: 60 MMHG | WEIGHT: 173.8 LBS | TEMPERATURE: 98 F | HEIGHT: 69 IN | BODY MASS INDEX: 25.74 KG/M2 | HEART RATE: 96 BPM

## 2018-10-23 DIAGNOSIS — J20.9 ACUTE BRONCHITIS, UNSPECIFIED ORGANISM: ICD-10-CM

## 2018-10-23 DIAGNOSIS — E11.22 TYPE 2 DIABETES MELLITUS WITH STAGE 3 CHRONIC KIDNEY DISEASE, WITHOUT LONG-TERM CURRENT USE OF INSULIN (HCC): Chronic | ICD-10-CM

## 2018-10-23 DIAGNOSIS — N18.30 CHRONIC KIDNEY DISEASE, STAGE 3 (HCC): Chronic | ICD-10-CM

## 2018-10-23 DIAGNOSIS — J44.1 COPD WITH ACUTE EXACERBATION (HCC): Primary | ICD-10-CM

## 2018-10-23 DIAGNOSIS — J30.1 SEASONAL ALLERGIC RHINITIS DUE TO POLLEN: ICD-10-CM

## 2018-10-23 DIAGNOSIS — N18.30 TYPE 2 DIABETES MELLITUS WITH STAGE 3 CHRONIC KIDNEY DISEASE, WITHOUT LONG-TERM CURRENT USE OF INSULIN (HCC): Chronic | ICD-10-CM

## 2018-10-23 PROCEDURE — 96372 THER/PROPH/DIAG INJ SC/IM: CPT | Performed by: INTERNAL MEDICINE

## 2018-10-23 PROCEDURE — 99214 OFFICE O/P EST MOD 30 MIN: CPT | Performed by: INTERNAL MEDICINE

## 2018-10-23 RX ORDER — METHYLPREDNISOLONE ACETATE 80 MG/ML
80 INJECTION, SUSPENSION INTRA-ARTICULAR; INTRALESIONAL; INTRAMUSCULAR; SOFT TISSUE ONCE
Status: COMPLETED | OUTPATIENT
Start: 2018-10-23 | End: 2018-10-23

## 2018-10-23 RX ORDER — TRIAMCINOLONE ACETONIDE 40 MG/ML
20 INJECTION, SUSPENSION INTRA-ARTICULAR; INTRAMUSCULAR ONCE
Status: COMPLETED | OUTPATIENT
Start: 2018-10-23 | End: 2018-10-23

## 2018-10-23 RX ORDER — AZITHROMYCIN 250 MG/1
TABLET, FILM COATED ORAL
Qty: 6 TABLET | Refills: 0 | Status: SHIPPED | OUTPATIENT
Start: 2018-10-23 | End: 2018-11-12

## 2018-10-23 RX ORDER — FLUTICASONE PROPIONATE 50 MCG
1 SPRAY, SUSPENSION (ML) NASAL 2 TIMES DAILY
Qty: 1 BOTTLE | Refills: 11 | Status: SHIPPED | OUTPATIENT
Start: 2018-10-23

## 2018-10-23 RX ADMIN — METHYLPREDNISOLONE ACETATE 80 MG: 80 INJECTION, SUSPENSION INTRA-ARTICULAR; INTRALESIONAL; INTRAMUSCULAR; SOFT TISSUE at 16:16

## 2018-10-23 RX ADMIN — TRIAMCINOLONE ACETONIDE 20 MG: 40 INJECTION, SUSPENSION INTRA-ARTICULAR; INTRAMUSCULAR at 16:16

## 2018-10-23 NOTE — PROGRESS NOTES
Subjective       History of Present Illness     Sy Florez is a very pleasant 89 y.o. male with a very complex set of medical issues including extensive atherosclerotic disease, type 2 diabetes, stage III chronic kidney disease, and some chronic lung disease, as well as multiple other medical issues who comes in reporting persistent wheezing and mild dyspnea on exertion as well as worsening paroxysmal cough, which has been waxing and waning for a couple of months with acute flare of symptoms approximately two weeks ago.  He reports paroxysmal cough, frequently productive of yellow sputum and occasionally nonproductive.  Cough is worse when he lies down at night.  He is a former smoker who stopped over forty years ago.  He continues on low dose prednisone 5 mg daily for flares of his autoimmune skin disease and chronic arthritic pains.   We prescribed an albuterol inhaler in August, which he doesn't feel helped significantly, but it does seem to partially improve his symptoms for couple hours.  He is having a lot of postnasal drainage, but is not taking a daily antihistamine or any nasal steroids.  He has allegra at home, which I recommended he resume.  No fevers or chills.  No facial pain or purulent nasal discharge.      Review of Systems   Constitutional: Negative for chills, fatigue and fever.   HENT: Positive for congestion. Negative for ear pain, postnasal drip, sinus pressure and sore throat.    Respiratory: Positive for cough and wheezing. Negative for shortness of breath.    Cardiovascular: Negative for chest pain, palpitations and leg swelling.   Gastrointestinal: Negative for abdominal pain, blood in stool, constipation, diarrhea, nausea and vomiting.   Endocrine: Negative for cold intolerance, heat intolerance, polydipsia and polyuria.   Genitourinary: Negative for dysuria, frequency, hematuria and urgency.   Skin: Negative for rash.   Neurological: Negative for syncope and weakness.     "    Objective     Vitals:    10/23/18 1547   BP: 138/60   Pulse: 96   Temp: 98 °F (36.7 °C)   TempSrc: Oral   SpO2: 95%   Weight: 78.8 kg (173 lb 12.8 oz)   Height: 175.3 cm (69\")     Physical Exam   Constitutional: He is oriented to person, place, and time. He appears well-developed and well-nourished. No distress.   HENT:   Head: Normocephalic and atraumatic.   Nose: Nose normal.   Mouth/Throat: Oropharynx is clear and moist. No oropharyngeal exudate.   Clear postnasal drip.    Eyes: Pupils are equal, round, and reactive to light. EOM are normal.   Neck: Neck supple. No JVD present. No thyromegaly present.   Cardiovascular: Normal rate, regular rhythm and normal heart sounds.    Pulmonary/Chest: Effort normal. No accessory muscle usage. No respiratory distress. He has wheezes. He has no rales.   Chronic lung sounds and expiratory wheezes.     Abdominal: Soft. Bowel sounds are normal. He exhibits no distension. There is no tenderness.   Musculoskeletal: He exhibits no edema.   Lymphadenopathy:     He has no cervical adenopathy.   Neurological: He is alert and oriented to person, place, and time. No cranial nerve deficit.   Psychiatric: He has a normal mood and affect. His speech is normal and behavior is normal. Judgment and thought content normal.     Future Appointments  Date Time Provider Department Center   11/12/2018 1:30 PM NURSE [facility] OPI [facility]   11/12/2018 2:00 PM Char Soto APRN [facility] ONC [facility]   2/18/2019 1:15 PM John Rodriguez MD [facility] POW None         Assessment/Plan    The patient smoked cigarettes for a good part of his adult life, but stopped many years ago.  There appears to be some undetermined degree of COPD.  This apparent COPD exacerbation is a new problem    A prescription is sent for Z-pack to take as directed and Flonase nasal spray to use one spray each nostril b.i.d.  Recommended he resume Allegra.   Continue with the Albuterol rescue inhaler every 4h as needed for wheezing or " paroxysmal cough.   A sample of Symbicort is given to use two puffs b.i.d.  Patient is given Kenalog 20 mg and Depo-Medrol 80 mg IM without difficulty or complications.  Patient tolerated well without complications.      Monitor the glucose closely due to the steroids.    Continue to avoid NSAIDs and other nephrotoxic drugs.    Return in February for routine follow up with fasting labs one week prior, or sooner if needed.     Scribed for Dr. Rodriguez by Johanna Reyes [facility].     Diagnoses and all orders for this visit:    COPD with acute exacerbation (CMS/[facility])  -     methylPREDNISolone acetate (DEPO-medrol) injection 80 mg; Inject 1 mL into the appropriate muscle as directed by prescriber 1 (One) Time.  -     triamcinolone acetonide (KENALOG-40) injection 20 mg; Inject 0.5 mL into the appropriate muscle as directed by prescriber 1 (One) Time.    Acute bronchitis, unspecified organism    Seasonal allergic rhinitis due to pollen  -     methylPREDNISolone acetate (DEPO-medrol) injection 80 mg; Inject 1 mL into the appropriate muscle as directed by prescriber 1 (One) Time.  -     triamcinolone acetonide (KENALOG-40) injection 20 mg; Inject 0.5 mL into the appropriate muscle as directed by prescriber 1 (One) Time.    Type 2 diabetes mellitus with stage 3 chronic kidney disease, without long-term current use of insulin (CMS/[facility])    Chronic kidney disease, stage 3 (CMS/[facility])    Other orders  -     azithromycin (ZITHROMAX) 250 MG tablet; Take 2 tablets the first day, then 1 tablet daily for 4 days.  -     fluticasone (FLONASE) 50 MCG/ACT nasal spray; 1 spray into the nostril(s) as directed by provider 2 (Two) Times a Day. Administer 1 spray in each nostril twice daily.        No visits with results within 3 Week(s) from this visit.   Latest known visit with results is:   Lab on 09/13/2018   Component Date Value Ref Range Status   • Occult Blood, Fecal by Immunoassay 09/13/2018 Negative  Negative Final   ]

## 2018-11-12 ENCOUNTER — LAB (OUTPATIENT)
Dept: ONCOLOGY | Facility: HOSPITAL | Age: 83
End: 2018-11-12

## 2018-11-12 ENCOUNTER — OFFICE VISIT (OUTPATIENT)
Dept: ONCOLOGY | Facility: CLINIC | Age: 83
End: 2018-11-12

## 2018-11-12 VITALS
DIASTOLIC BLOOD PRESSURE: 70 MMHG | RESPIRATION RATE: 18 BRPM | SYSTOLIC BLOOD PRESSURE: 135 MMHG | TEMPERATURE: 97 F | WEIGHT: 175.4 LBS | HEART RATE: 62 BPM | HEIGHT: 69 IN | BODY MASS INDEX: 25.98 KG/M2

## 2018-11-12 DIAGNOSIS — D50.8 OTHER IRON DEFICIENCY ANEMIA: Primary | Chronic | ICD-10-CM

## 2018-11-12 DIAGNOSIS — K92.1 HEMATOCHEZIA: ICD-10-CM

## 2018-11-12 LAB
BASOPHILS # BLD AUTO: 0.02 10*3/MM3 (ref 0–0.2)
BASOPHILS NFR BLD AUTO: 0.2 % (ref 0–2)
DEPRECATED RDW RBC AUTO: 56.5 FL (ref 35.1–43.9)
EOSINOPHIL # BLD AUTO: 0.04 10*3/MM3 (ref 0–0.7)
EOSINOPHIL NFR BLD AUTO: 0.4 % (ref 0–7)
ERYTHROCYTE [DISTWIDTH] IN BLOOD BY AUTOMATED COUNT: 16.3 % (ref 11.5–14.5)
FERRITIN SERPL-MCNC: 351 NG/ML (ref 17.9–464)
HCT VFR BLD AUTO: 39.1 % (ref 39–49)
HGB BLD-MCNC: 13 G/DL (ref 13.7–17.3)
IMM GRANULOCYTES # BLD: 0.08 10*3/MM3 (ref 0–0.02)
IMM GRANULOCYTES NFR BLD: 0.8 % (ref 0–0.5)
IRON 24H UR-MRATE: 85 MCG/DL (ref 49–181)
IRON SATN MFR SERPL: 28 % (ref 20–55)
LYMPHOCYTES # BLD AUTO: 0.97 10*3/MM3 (ref 0.6–4.2)
LYMPHOCYTES NFR BLD AUTO: 9.2 % (ref 10–50)
MCH RBC QN AUTO: 31.4 PG (ref 26.5–34)
MCHC RBC AUTO-ENTMCNC: 33.2 G/DL (ref 31.5–36.3)
MCV RBC AUTO: 94.4 FL (ref 80–98)
MONOCYTES # BLD AUTO: 0.45 10*3/MM3 (ref 0–0.9)
MONOCYTES NFR BLD AUTO: 4.3 % (ref 0–12)
NEUTROPHILS # BLD AUTO: 8.95 10*3/MM3 (ref 2–8.6)
NEUTROPHILS NFR BLD AUTO: 85.1 % (ref 37–80)
PLATELET # BLD AUTO: 191 10*3/MM3 (ref 150–450)
PMV BLD AUTO: 10.5 FL (ref 8–12)
RBC # BLD AUTO: 4.14 10*6/MM3 (ref 4.37–5.74)
TIBC SERPL-MCNC: 307 MCG/DL (ref 261–462)
WBC NRBC COR # BLD: 10.51 10*3/MM3 (ref 3.2–9.8)

## 2018-11-12 PROCEDURE — 85025 COMPLETE CBC W/AUTO DIFF WBC: CPT | Performed by: NURSE PRACTITIONER

## 2018-11-12 PROCEDURE — G0463 HOSPITAL OUTPT CLINIC VISIT: HCPCS | Performed by: NURSE PRACTITIONER

## 2018-11-12 PROCEDURE — 83540 ASSAY OF IRON: CPT | Performed by: NURSE PRACTITIONER

## 2018-11-12 PROCEDURE — 83550 IRON BINDING TEST: CPT | Performed by: NURSE PRACTITIONER

## 2018-11-12 PROCEDURE — 99214 OFFICE O/P EST MOD 30 MIN: CPT | Performed by: NURSE PRACTITIONER

## 2018-11-12 PROCEDURE — 82728 ASSAY OF FERRITIN: CPT | Performed by: NURSE PRACTITIONER

## 2018-11-12 NOTE — PROGRESS NOTES
DATE OF VISIT: 11/12/2018    REASON FOR VISIT:  Follow-up for iron deficiency anemia and anemia secondary to chronic kidney disease    HISTORY OF PRESENT ILLNESS:  89-year-old male with a past medical history significant for history of peripheral vascular disease status post femoropopliteal bypass, prostate cancer status post seed in 2001, history of coronary artery disease status post CABG was initially seen in consultation on December 20, 2017 for evaluation of iron deficiency anemia and not able to tolerate borderline.  Subsequently patient received 2 dose of intravenous Feraheme last of which was on January 11, 2018. His iron level improved. Recently iron levels dropped again and he was given 2 additional doses of Feraheme. His stool was checked and was negative; referral to GI was placed but patient refused to go.     Patient is here for follow-up visit today. He denies any blood in stool or urine. States he had bronchitis last month and received tx by his PCP that included steroid injections. weeks ago. Denies any abdominal pain, no nausea or vomiting or diarrhea or constipation.        PAST MEDICAL HISTORY:    Past Medical History:   Diagnosis Date   • Actinic keratosis    • Acute prostatitis     h/o 2014    • Anemia      New problem noted with preoperative labs, 10/2015.. Anemia workup initiated, 10/2015      • Arthritis      Possible inflammatory arthritis bilateral knees   • Bursitis of hip     right greater trochanteric bursitis, spring 2014   • Cellulitis of lower limb     mild bilateral lower extremities   • Chronic kidney disease, stage 3 (CMS/HCC)     - cr =1.8-2.0, gradually worsening   • Claudication (CMS/HCC)     dr. you s/p numerous revascularization. medical management   • Coronary arteriosclerosis     dr. perez   • Degenerative joint disease involving multiple joints    • Disorder of lumbar spine     Lumbar laminectomy at [facility], 11/4/2015      • Dyslipidemia     Resumed statin  therapy 1/2015. Lipitor   • Edema of lower extremity      Etiology undetermined. Chronic venous insufficiency is playing a role   • Essential hypertension    • Eustachian tube disorder    • Gastroesophageal reflux disease    • Hyperlipidemia     Resumed extended interval Lipitor, 7/2015   • Idiopathic peripheral neuropathy     Gradually increasing Neurontin. Referred to Dr. Vergara, neurologist, 4/2015      • IFG (impaired fasting glucose)    • Iron deficiency anemia      New problem noted with preoperative labs, 10/2015.. Anemia workup initiated, 10/2015      • Leg cramp     nocturnal leg cramps   • Malaise and fatigue    • Malignant tumor of prostate (CMS/HCC)     6/2001 s/p brachytherapy   • Peripheral vascular disease (CMS/HCC)     PAD/claudication. Dr. Brennan      • Peripheral venous insufficiency     Chronic venous insufficiency bilateral lower extremities, left worse than right      • Pneumonia     H/O   • Pruritic rash     Autoimmune. Responds to methotrexate. Dr. Oliveros, dermatology. The patient stopped the methotrexate and declines resumption. Started low-dose prednisone, 4/2016      • Type 2 diabetes mellitus (CMS/HCC)     Newly diagnosed, 8/2015. Started Amaryl, 8/2015. Metformin and pioglitizone contraindicated   • Type 2 diabetes mellitus with chronic kidney disease, without long-term current use of insulin (CMS/HCC)     Newly diagnosed, 8/2015. Started Amaryl, 8/2015. Metformin and pioglitizone contraindicated   • UTI (urinary tract infection)     H/O,site not specified-possible       SOCIAL HISTORY:    Social History     Tobacco Use   • Smoking status: Former Smoker     Types: Cigarettes   • Smokeless tobacco: Never Used   • Tobacco comment: 40 years ago   Substance Use Topics   • Alcohol use: No   • Drug use: No       Surgical History :  Past Surgical History:   Procedure Laterality Date   • CORONARY ARTERY BYPASS GRAFT      vein,two  09/2006   • ENDOSCOPY AND COLONOSCOPY      7/2011 declines   •  "OTHER SURGICAL HISTORY      femoral-popliteal bypass graft - right leg 1997/left leg 2007   • OTHER SURGICAL HISTORY      laser surgery of prostate, - radiation implant;  Last Performed: 06/2001   • OTHER SURGICAL HISTORY      low back disk surgery , - Lumbar laminectomy with L4/L5 mechanical fusion;  Last Performed: 11/2015   • OTHER SURGICAL HISTORY      remove impacted cerumen 4/06/16       ALLERGIES:    Allergies   Allergen Reactions   • Bacitracin Rash   • Formaldehyde Rash   • Framycetin Rash     All Fragrances   • Neomycin Rash   • Nickel Rash       REVIEW OF SYSTEMS:      CONSTITUTIONAL:  No fever, chills, or night sweats.     HEENT:  No epistaxis, mouth sores, or difficulty swallowing.    RESPIRATORY:  No new shortness of breath or cough at present.    CARDIOVASCULAR:  No chest pain or palpitations.    GASTROINTESTINAL:  No abdominal pain, nausea, vomiting, or blood in the stool.    GENITOURINARY:  No dysuria or hematuria.    MUSCULOSKELETAL:  No any new back pain or arthralgias.     NEUROLOGICAL:  No tingling or numbness. No new headache or dizziness.     LYMPHATICS:  Denies any abnormal swollen and anywhere in the body.    SKIN:  Denies any new skin rash.    PHYSICAL EXAMINATION:      VITAL SIGNS:  /70   Pulse 62   Temp 97 °F (36.1 °C)   Resp 18   Ht 175.3 cm (69\")   Wt 79.6 kg (175 lb 6.4 oz)   BMI 25.90 kg/m²     GENERAL:  Not in any distress.    HEENT:  Normocephalic, Atraumatic.Mild Conjunctival pallor. No icterus. No Facial Asymmetry noted.    NECK:  No adenopathy. No JVD.    RESPIRATORY:  Fair air entry bilateral. No rhonchi or wheezing.    CARDIOVASCULAR:  S1, S2. Regular rate and rhythm. No murmur or gallop appreciated.    ABDOMEN:  Soft, obese, nontender. Bowel sounds present in all four quadrants.  No organomegaly appreciated.    EXTREMITIES:  No edema.No Calf Tenderness.    NEUROLOGIC:  Alert, awake and oriented ×3.      SKIN : No new skin lesion identified  DIAGNOSTIC DATA:  "   Glucose   Date Value Ref Range Status   08/13/2018 127 (H) 74 - 99 mg/dL Final     Sodium   Date Value Ref Range Status   08/13/2018 142 137 - 145 mmol/L Final     Potassium   Date Value Ref Range Status   08/13/2018 4.3 3.4 - 5.0 mmol/L Final     CO2   Date Value Ref Range Status   08/13/2018 25.0 22.0 - 30.0 mmol/L Final     Chloride   Date Value Ref Range Status   08/13/2018 107 98 - 107 mmol/L Final     Anion Gap   Date Value Ref Range Status   08/13/2018 10.0 5.0 - 15.0 mmol/L Final     Creatinine   Date Value Ref Range Status   08/13/2018 2.35 (H) 0.66 - 1.25 mg/dL Final     BUN   Date Value Ref Range Status   08/13/2018 54 (H) 9 - 20 mg/dL Final     BUN/Creatinine Ratio   Date Value Ref Range Status   08/13/2018 23.0 7.0 - 25.0 Final     Calcium   Date Value Ref Range Status   08/13/2018 9.5 8.4 - 10.2 mg/dL Final     eGFR Non  Amer   Date Value Ref Range Status   08/13/2018 26 (L) 42 - 98 mL/min/1.73 Final     Alkaline Phosphatase   Date Value Ref Range Status   08/13/2018 107 38 - 126 U/L Final     Total Protein   Date Value Ref Range Status   08/13/2018 7.0 6.3 - 8.2 g/dL Final     ALT (SGPT)   Date Value Ref Range Status   08/13/2018 16 <=50 U/L Final     AST (SGOT)   Date Value Ref Range Status   08/13/2018 22 17 - 59 U/L Final     Total Bilirubin   Date Value Ref Range Status   08/13/2018 0.4 0.2 - 1.3 mg/dL Final     Albumin   Date Value Ref Range Status   08/13/2018 4.20 3.50 - 5.00 g/dL Final     Globulin   Date Value Ref Range Status   08/13/2018 2.8 2.3 - 3.5 gm/dL Final     Lab Results   Component Value Date    WBC 10.51 (H) 11/12/2018    HGB 13.0 (L) 11/12/2018    HCT 39.1 11/12/2018    MCV 94.4 11/12/2018     11/12/2018     Lab Results   Component Value Date    NEUTROABS 8.95 (H) 11/12/2018    IRON 49 08/13/2018    TIBC 324 08/13/2018    LABIRON 15 (L) 08/13/2018    FERRITIN 72.90 08/13/2018    RABXEFEV28 775 05/25/2018    FOLATE 11.30 05/25/2018     No results found for: ,  LABCA2, AFPTM, HCGQUANT, , CHROMGRNA, [id number], CEA, REFLABREPO]        ASSESSMENT AND PLAN:    1.  Iron deficiency anemia secondary to possible GI bleed;   He received IV iron back in January 2018 due to his inability to tolerate PO iron; his iron levels improved; recently levels dropped again and he was given 2 additional doses of iron; stool occult was negative and GI referral was placed but pt refused to go. His Hgb has improved to 13 today; iron studies pending. He denies any bleeding at present time; will plan to see him again in 4 mos with recheck of labs again.     2.  Chronic kidney disease stage III; pt states he has a nephrologist that he has seen at [facility]; creat level today is pending.     3.  History of prostate cancer status post seed implant.Being followed by different provider     4.  History of peripheral vascular disease status post femoropopliteal bypass.  Had intervention done recently at [facility] in left lower extremity. He is seeing cardiologist in [place] next month.    Patient's Body mass index is 25.9 kg/m². BMI is within normal parameters. No follow-up required.          This document has been signed by JUAN M Hernandez on November 12, 2018 2:07 PM

## 2018-11-28 RX ORDER — PREDNISONE 1 MG/1
TABLET ORAL
Qty: 60 TABLET | Refills: 2 | Status: SHIPPED | OUTPATIENT
Start: 2018-11-28 | End: 2019-05-07 | Stop reason: SDUPTHER

## 2018-12-04 RX ORDER — FUROSEMIDE 40 MG/1
TABLET ORAL
Qty: 30 TABLET | Refills: 11 | Status: SHIPPED | OUTPATIENT
Start: 2018-12-04 | End: 2020-01-15

## 2019-01-16 ENCOUNTER — DOCUMENTATION (OUTPATIENT)
Dept: FAMILY MEDICINE CLINIC | Facility: CLINIC | Age: 84
End: 2019-01-16

## 2019-01-16 NOTE — PROGRESS NOTES
I spoke to Lu on LTC at [facility] with orders. Patient is scheduled for discharge 01/18/2019. Dr. Rodriguez ordered to d/c on current meds and for him to make apt in 1-2 weeks. TP

## 2019-01-16 NOTE — PROGRESS NOTES
Subjective   Sy Florez is a 89 y.o. male.     History of Present Illness     {Common H&P Review Areas:10620}    Review of Systems    Objective   Physical Exam      Assessment/Plan   {Assess/PlanSmartLinks:83823}

## 2019-01-18 RX ORDER — HYDROCODONE BITARTRATE AND ACETAMINOPHEN 7.5; 325 MG/1; MG/1
TABLET ORAL
Qty: 40 TABLET | Refills: 0 | Status: SHIPPED | OUTPATIENT
Start: 2019-01-18 | End: 2019-02-18 | Stop reason: SDUPTHER

## 2019-01-21 RX ORDER — AMLODIPINE BESYLATE 5 MG/1
TABLET ORAL
Qty: 30 TABLET | Refills: 11 | Status: SHIPPED | OUTPATIENT
Start: 2019-01-21 | End: 2019-01-23 | Stop reason: SDUPTHER

## 2019-01-23 ENCOUNTER — OFFICE VISIT (OUTPATIENT)
Dept: FAMILY MEDICINE CLINIC | Facility: CLINIC | Age: 84
End: 2019-01-23

## 2019-01-23 VITALS
BODY MASS INDEX: 25.03 KG/M2 | HEART RATE: 75 BPM | SYSTOLIC BLOOD PRESSURE: 116 MMHG | OXYGEN SATURATION: 98 % | TEMPERATURE: 97.5 F | HEIGHT: 69 IN | WEIGHT: 169 LBS | DIASTOLIC BLOOD PRESSURE: 60 MMHG

## 2019-01-23 DIAGNOSIS — N18.30 TYPE 2 DIABETES MELLITUS WITH STAGE 3 CHRONIC KIDNEY DISEASE, WITHOUT LONG-TERM CURRENT USE OF INSULIN (HCC): Chronic | ICD-10-CM

## 2019-01-23 DIAGNOSIS — I83.022 VENOUS STASIS ULCER OF LEFT CALF LIMITED TO BREAKDOWN OF SKIN, UNSPECIFIED WHETHER VARICOSE VEINS PRESENT (HCC): ICD-10-CM

## 2019-01-23 DIAGNOSIS — I10 ESSENTIAL HYPERTENSION: Chronic | ICD-10-CM

## 2019-01-23 DIAGNOSIS — N18.30 CHRONIC KIDNEY DISEASE, STAGE 3 (HCC): Chronic | ICD-10-CM

## 2019-01-23 DIAGNOSIS — L97.221 VENOUS STASIS ULCER OF LEFT CALF LIMITED TO BREAKDOWN OF SKIN, UNSPECIFIED WHETHER VARICOSE VEINS PRESENT (HCC): ICD-10-CM

## 2019-01-23 DIAGNOSIS — S81.811A NONINFECTED SKIN TEAR OF RIGHT LOWER EXTREMITY, INITIAL ENCOUNTER: ICD-10-CM

## 2019-01-23 DIAGNOSIS — I73.9 PERIPHERAL VASCULAR DISEASE (HCC): Chronic | ICD-10-CM

## 2019-01-23 DIAGNOSIS — S52.021D CLOSED FRACTURE OF OLECRANON PROCESS OF RIGHT ULNA WITH ROUTINE HEALING, SUBSEQUENT ENCOUNTER: ICD-10-CM

## 2019-01-23 DIAGNOSIS — I87.2 PERIPHERAL VENOUS INSUFFICIENCY: Chronic | ICD-10-CM

## 2019-01-23 DIAGNOSIS — D50.8 OTHER IRON DEFICIENCY ANEMIA: Primary | Chronic | ICD-10-CM

## 2019-01-23 DIAGNOSIS — C61 MALIGNANT TUMOR OF PROSTATE (HCC): Chronic | ICD-10-CM

## 2019-01-23 DIAGNOSIS — Z09 HOSPITAL DISCHARGE FOLLOW-UP: ICD-10-CM

## 2019-01-23 DIAGNOSIS — E11.22 TYPE 2 DIABETES MELLITUS WITH STAGE 3 CHRONIC KIDNEY DISEASE, WITHOUT LONG-TERM CURRENT USE OF INSULIN (HCC): Chronic | ICD-10-CM

## 2019-01-23 PROCEDURE — 99214 OFFICE O/P EST MOD 30 MIN: CPT | Performed by: INTERNAL MEDICINE

## 2019-01-23 RX ORDER — GABAPENTIN 600 MG/1
TABLET ORAL
Qty: 60 TABLET | Refills: 5 | Status: SHIPPED | OUTPATIENT
Start: 2019-01-23 | End: 2019-08-09 | Stop reason: SDUPTHER

## 2019-01-23 NOTE — PROGRESS NOTES
Subjective        History of Present Illness      Sy Florez is a 89 y.o. male who comes in for hospital follow up.  He was admitted to [facility] 12/26/18 after a fall resulting in right elbow/olecranon fracture and hairline fracture of anteromedial aspect of right hip.  He fell to the ground after he was walking down some steps and missed a step.  He was evaluated by emergency room at the [facility] and provided pain medication.  The following day, he started to have a cough and chills and presented to the emergency room at [facility].  Patient received 1 gm IV Rocephin and 500 mg IV azithromycin in ED for questionable pneumonia.  Chest x-ray was negative. He was treated with Rocephin and Zithromax initially and nebs for acute bronchitis.      Dr. Vickers was consulted and took patient for surgical fixation of his olecranon fracture on 12/28/2018. Patient did well postoperatively. He was monitored  closely due longstanding history of iron-deficiency anemia.  PT was consulted and patient was transferred to skilled nursing for continued   rehab.  He was discharged on 12/31/18. Hemoglobin had improved to over 12 at discharge.  His elbow is healing well, although, he continues to have right hip continues to bother him, but Dr. Vickers feels this will gradually improve.  He has a follow up appointment tomorrow.  He is taking a Norco 7.5/325 and Neurontin 600 mg q.h.s., which is allowing pain relief at night to enable him to sleep.       He struggles with reports extensive vascular disease and has developed a venous stasis ulcer left lower calf with increased lower extremity edema.  The edema is starting to improve.  He normally wears compression stockings, but has left them off since last Friday.  I am sending a prescription for Polymem to place over the ulcer after it drains and recommended wearing loose fitting stockings gradually increasing pressure    He is not currently taking his Crestor, which  "he was taking twice weekly.  He was extensive peripheral vascular disease and reports while he has been off of the Crestor, he has not been experiencing the lower extremity episodic leg cramps.  I recommended he try to resume the Crestor twice weekly, but hold the medicine for a week whenever he experiences leg cramps.       Review of Systems   Constitutional: Negative for chills, fatigue and fever.   HENT: Negative for congestion, ear pain, postnasal drip, sinus pressure and sore throat.    Respiratory: Negative for cough, shortness of breath and wheezing.    Cardiovascular: Positive for leg swelling. Negative for chest pain and palpitations.   Gastrointestinal: Negative for abdominal pain, blood in stool, constipation, diarrhea, nausea and vomiting.   Endocrine: Negative for cold intolerance, heat intolerance, polydipsia and polyuria.   Genitourinary: Negative for dysuria, frequency, hematuria and urgency.   Musculoskeletal: Positive for arthralgias and back pain.   Skin: Negative for rash.   Neurological: Negative for syncope and weakness.        Objective     Vitals:    01/23/19 0852   BP: 116/60   Pulse: 75   Temp: 97.5 °F (36.4 °C)   TempSrc: Oral   SpO2: 98%   Weight: 76.7 kg (169 lb)   Height: 175.3 cm (69\")       Physical Exam   Constitutional: He is oriented to person, place, and time. He appears well-developed and well-nourished. No distress.   Accompanied by his daughter.    HENT:   Head: Normocephalic and atraumatic.   Nose: Right sinus exhibits no maxillary sinus tenderness and no frontal sinus tenderness. Left sinus exhibits no maxillary sinus tenderness and no frontal sinus tenderness.   Mouth/Throat: Uvula is midline, oropharynx is clear and moist and mucous membranes are normal. No oral lesions. No tonsillar exudate.   Eyes: Conjunctivae and EOM are normal. Pupils are equal, round, and reactive to light.   Neck: Trachea normal. Neck supple. No JVD present. Carotid bruit is not present. No tracheal " deviation present. No thyroid mass and no thyromegaly present.   Cardiovascular: Normal rate, regular rhythm, normal heart sounds and intact distal pulses.  No extrasystoles are present. PMI is not displaced.   No murmur heard.  Pulmonary/Chest: Effort normal and breath sounds normal. No accessory muscle usage. No respiratory distress. He has no decreased breath sounds. He has no wheezes. He has no rhonchi. He has no rales.   Abdominal: Soft. Bowel sounds are normal. He exhibits no distension. There is no hepatosplenomegaly. There is no tenderness.     Vascular Status -  His right foot exhibits normal foot vasculature  and no edema. His left foot exhibits normal foot vasculature  and no edema.  Lymphadenopathy:     He has no cervical adenopathy.   Neurological: He is alert and oriented to person, place, and time. No cranial nerve deficit. Coordination normal.   Skin: Skin is warm, dry and intact. No rash noted. No cyanosis. Nails show no clubbing.   Sun-damaged skin    Psychiatric: He has a normal mood and affect. His speech is normal and behavior is normal. Judgment and thought content normal.   Vitals reviewed.    Future Appointments   Date Time Provider Department Center   2/18/2019  1:15 PM John Rodriguez MD [facility] POW None   3/13/2019  1:30 PM NURSE [facility] OPI MAD   3/13/2019  2:00 PM Char Soto APRN [facility]           Assessment/Plan      A prescription is given for Polymem dressing to place over the venous stasis ulcer when it drains.  Notify us if they start to notice signs of cellulitis including warmth or colored drainage.      Continue to follow up with Dr. Vickers for olecranon fracture sustained on 12/28/2018 with a fall.      Recommended resume wearing low pressure compression stockings gradually increasing pressure.  Continue Norco and Neurontin for pain.  Refill is given for Neurontin.  Patient understands the risks associated with this controlled medication, including tolerance  and addiction.  He also agrees to only obtain this medication from me, and not from a another provider, unless that provider is covering for me in my absence.  He also agrees to be compliant in dosing, and not self adjust the dose of medication.  A signed controlled substance agreement is on file, and he has received a controlled substance education sheet at this a previous visit.  He has also signed a consent for treatment with a controlled substance as per [facility] policy. TRAE was obtained.    I recommended he resume the Crestor twice weekly taking breaks if leg cramps/myalgias recur with restarting the Crestor.     Current outpatient and discharge medications have been reconciled for the patient.  Reviewed by: John Rodriguez MD      Continue other medications and vitamin and mineral supplements to treat additional medical problems which we addressed today.      Return next month 02/18/19 for routine follow up with fasting labs one week prior.     Scribed for Dr. Rodriguez by Johanna Reyes [facility].     Diagnoses and all orders for this visit:    Other iron deficiency anemia    Chronic kidney disease, stage 3 (CMS/HCC)    Closed fracture of olecranon process of right ulna with routine healing, subsequent encounter    Hospital discharge follow-up    Essential hypertension    Peripheral vascular disease (CMS/HCC)    Type 2 diabetes mellitus with stage 3 chronic kidney disease, without long-term current use of insulin (CMS/HCC)    Malignant tumor of prostate (CMS/HCC)    Peripheral venous insufficiency    Venous stasis ulcer of left calf limited to breakdown of skin, unspecified whether varicose veins present (CMS/HCC)    Noninfected skin tear of right lower extremity, initial encounter    Other orders  -     gabapentin (NEURONTIN) 600 MG tablet; Take one tab twice daily        No visits with results within 3 Week(s) from this visit.   Latest known visit with results is:   Lab on 11/12/2018   Component Date  Value Ref Range Status   • Ferritin 11/12/2018 351.00  17.90 - 464.00 ng/mL Final   • Iron 11/12/2018 85  49 - 181 mcg/dL Final   • TIBC 11/12/2018 307  261 - 462 mcg/dL Final   • Iron Saturation 11/12/2018 28  20 - 55 % Final   • WBC 11/12/2018 10.51* 3.20 - 9.80 10*3/mm3 Final   • RBC 11/12/2018 4.14* 4.37 - 5.74 10*6/mm3 Final   • Hemoglobin 11/12/2018 13.0* 13.7 - 17.3 g/dL Final   • Hematocrit 11/12/2018 39.1  39.0 - 49.0 % Final   • MCV 11/12/2018 94.4  80.0 - 98.0 fL Final   • MCH 11/12/2018 31.4  26.5 - 34.0 pg Final   • MCHC 11/12/2018 33.2  31.5 - 36.3 g/dL Final   • RDW 11/12/2018 16.3* 11.5 - 14.5 % Final   • RDW-SD 11/12/2018 56.5* 35.1 - 43.9 fl Final   • MPV 11/12/2018 10.5  8.0 - 12.0 fL Final   • Platelets 11/12/2018 191  150 - 450 10*3/mm3 Final   • Neutrophil % 11/12/2018 85.1* 37.0 - 80.0 % Final   • Lymphocyte % 11/12/2018 9.2* 10.0 - 50.0 % Final   • Monocyte % 11/12/2018 4.3  0.0 - 12.0 % Final   • Eosinophil % 11/12/2018 0.4  0.0 - 7.0 % Final   • Basophil % 11/12/2018 0.2  0.0 - 2.0 % Final   • Immature Grans % 11/12/2018 0.8* 0.0 - 0.5 % Final   • Neutrophils, Absolute 11/12/2018 8.95* 2.00 - 8.60 10*3/mm3 Final   • Lymphocytes, Absolute 11/12/2018 0.97  0.60 - 4.20 10*3/mm3 Final   • Monocytes, Absolute 11/12/2018 0.45  0.00 - 0.90 10*3/mm3 Final   • Eosinophils, Absolute 11/12/2018 0.04  0.00 - 0.70 10*3/mm3 Final   • Basophils, Absolute 11/12/2018 0.02  0.00 - 0.20 10*3/mm3 Final   • Immature Grans, Absolute 11/12/2018 0.08* 0.00 - 0.02 10*3/mm3 Final   ]

## 2019-01-25 ENCOUNTER — OFFICE VISIT (OUTPATIENT)
Dept: FAMILY MEDICINE CLINIC | Facility: CLINIC | Age: 84
End: 2019-01-25

## 2019-01-25 VITALS
DIASTOLIC BLOOD PRESSURE: 60 MMHG | HEART RATE: 84 BPM | TEMPERATURE: 97.8 F | HEIGHT: 69 IN | WEIGHT: 169 LBS | SYSTOLIC BLOOD PRESSURE: 120 MMHG | BODY MASS INDEX: 25.03 KG/M2

## 2019-01-25 DIAGNOSIS — L03.115 CELLULITIS OF RIGHT LOWER EXTREMITY: ICD-10-CM

## 2019-01-25 DIAGNOSIS — I87.2 PERIPHERAL VENOUS INSUFFICIENCY: Chronic | ICD-10-CM

## 2019-01-25 DIAGNOSIS — I73.9 PERIPHERAL VASCULAR DISEASE (HCC): Chronic | ICD-10-CM

## 2019-01-25 DIAGNOSIS — N18.30 CHRONIC KIDNEY DISEASE, STAGE 3 (HCC): Chronic | ICD-10-CM

## 2019-01-25 DIAGNOSIS — L97.212 VENOUS STASIS ULCER OF RIGHT CALF WITH FAT LAYER EXPOSED WITHOUT VARICOSE VEINS (HCC): Primary | ICD-10-CM

## 2019-01-25 DIAGNOSIS — I87.2 VENOUS STASIS ULCER OF RIGHT CALF WITH FAT LAYER EXPOSED WITHOUT VARICOSE VEINS (HCC): Primary | ICD-10-CM

## 2019-01-25 PROCEDURE — 11042 DBRDMT SUBQ TIS 1ST 20SQCM/<: CPT | Performed by: INTERNAL MEDICINE

## 2019-01-25 PROCEDURE — 99214 OFFICE O/P EST MOD 30 MIN: CPT | Performed by: INTERNAL MEDICINE

## 2019-01-25 RX ORDER — CEPHALEXIN 500 MG/1
500 CAPSULE ORAL 3 TIMES DAILY
Qty: 21 CAPSULE | Refills: 0 | Status: SHIPPED | OUTPATIENT
Start: 2019-01-25 | End: 2019-02-01

## 2019-01-25 NOTE — PROGRESS NOTES
"Subjective     History of Present Illness     Sy Florez is a very pleasant, very complex 89 y.o. male with extensive vascular disease and chronic kidney disease who presents for re-evaluation of venous stasis ulcer of right calf, which opened and drained last evening.  When he was here two days ago, I sent a prescription for Polymem to place over the ulcer after it drained. His daughter who is a nurse practitioner was concerned with the ulcer and wanted to have it re-evaluated before the weekend.  A scalpel is used today to debride the venous stasis ulcer.  The ulcer is relatively shallow with full thickness skin loss and congealed blood.  There is some increased erythema, but still no particular warmth of the surrounding skin.  No purulent drainage.  No red streaking.  Wound care instructions are given and a prescription is sent for a 7-day course of Keflex.  They have been using the compression stockings, and his bilateral lower extremity edema has improved.      Review of Systems   Constitutional: Negative for chills, fatigue and fever.   HENT: Negative for congestion, ear pain, postnasal drip, sinus pressure and sore throat.    Respiratory: Negative for cough, shortness of breath and wheezing.    Cardiovascular: Negative for chest pain, palpitations and leg swelling.   Gastrointestinal: Negative for abdominal pain, blood in stool, constipation, diarrhea, nausea and vomiting.   Endocrine: Negative for cold intolerance, heat intolerance, polydipsia and polyuria.   Genitourinary: Negative for dysuria, frequency, hematuria and urgency.   Skin: Negative for rash.   Neurological: Negative for syncope and weakness.        Objective     Vitals:    01/25/19 1543   BP: 120/60   Pulse: 84   Temp: 97.8 °F (36.6 °C)   TempSrc: Oral   Weight: 76.7 kg (169 lb)   Height: 175.3 cm (69\")     Physical Exam   Constitutional: He is oriented to person, place, and time. He appears well-developed and well-nourished. No " distress.   Accompanied by his daughter.     HENT:   Head: Normocephalic and atraumatic.   Nose: Nose normal.   Mouth/Throat: Oropharynx is clear and moist. No oropharyngeal exudate.   Eyes: EOM are normal. Pupils are equal, round, and reactive to light.   Neck: Neck supple. No JVD present. No thyromegaly present.   Cardiovascular: Normal rate, regular rhythm and normal heart sounds.   Pulmonary/Chest: Effort normal and breath sounds normal. No accessory muscle usage. No respiratory distress. He has no wheezes. He has no rales.   Abdominal: Soft. Bowel sounds are normal. He exhibits no distension. There is no tenderness.   Musculoskeletal: He exhibits no edema.   Lymphadenopathy:     He has no cervical adenopathy.   Neurological: He is alert and oriented to person, place, and time. No cranial nerve deficit.   Skin:   A 4 cm venous stasis ulcer is noted right posterior calf with some surrounding erythema, but no increased warmth.  No purulent drainage.  The blister has burst.  I was able to debride the nonviable skin and remove congealed blood, revealing a clean wound bed.  There is full thickness skin loss.  No red streaking up the leg.  The amount of lower extremity edema has improved from last visit.  Still evidence of chronic venous insufficiency and peripheral vascular disease.   Psychiatric: He has a normal mood and affect. His speech is normal and behavior is normal. Judgment and thought content normal.       Future Appointments   Date Time Provider Department Center   2/18/2019  1:15 PM John Rodriguez MD [facility] None   3/13/2019  1:30 PM NURSE  [facility]  [facility]   3/13/2019  2:00 PM Char Soto APRN [facility]         Assessment/Plan      Procedures    The ruptured blister and sequela is a new problem.  After verbal informed consent, the area around the venous stasis ulcer right calf is cleaned in sterile technique with betadine prep.  A #11 scalpel is used to debride the nonviable tissue/skin  of the venous stasis ulcer right calf without difficulty.  Congealed blood is removed from the wound.  The leg is dressed with Polymem and covered with Tegaderm.  The compression stocking is then replaced.  Wound care instructions given today.   A prescription for Keflex 500 mg to take one t.i.d. X  seven days to help reduce the risk of skin/soft tissue infection.     Return in March for routine follow up with fasting labs one week prior.      Continue the current cardiovascular risk factor modifications.    Continue to avoid NSAIDs and other nephrotoxic drugs.  The dosing of antibiotic to continue consideration the patient's GFR.    Continue current management of his chronic venous insufficiency with the support stockings.  Keep legs elevated when not ambulating.     Scribed for Dr. Rodriguez by Johanna Reyes [facility].     Diagnoses and all orders for this visit:    Venous stasis ulcer of right calf with fat layer exposed without varicose veins (CMS/HCC)    Cellulitis of right lower extremity    Peripheral vascular disease (CMS/HCC)    Peripheral venous insufficiency    Chronic kidney disease, stage 3 (CMS/HCC)    Other orders  -     cephalexin (KEFLEX) 500 MG capsule; Take 1 capsule by mouth 3 (Three) Times a Day for 7 days.        No visits with results within 3 Week(s) from this visit.   Latest known visit with results is:   Lab on 11/12/2018   Component Date Value Ref Range Status   • Ferritin 11/12/2018 351.00  17.90 - 464.00 ng/mL Final   • Iron 11/12/2018 85  49 - 181 mcg/dL Final   • TIBC 11/12/2018 307  261 - 462 mcg/dL Final   • Iron Saturation 11/12/2018 28  20 - 55 % Final   • WBC 11/12/2018 10.51* 3.20 - 9.80 10*3/mm3 Final   • RBC 11/12/2018 4.14* 4.37 - 5.74 10*6/mm3 Final   • Hemoglobin 11/12/2018 13.0* 13.7 - 17.3 g/dL Final   • Hematocrit 11/12/2018 39.1  39.0 - 49.0 % Final   • MCV 11/12/2018 94.4  80.0 - 98.0 fL Final   • MCH 11/12/2018 31.4  26.5 - 34.0 pg Final   • MCHC 11/12/2018 33.2  31.5 -  36.3 g/dL Final   • RDW 11/12/2018 16.3* 11.5 - 14.5 % Final   • RDW-SD 11/12/2018 56.5* 35.1 - 43.9 fl Final   • MPV 11/12/2018 10.5  8.0 - 12.0 fL Final   • Platelets 11/12/2018 191  150 - 450 10*3/mm3 Final   • Neutrophil % 11/12/2018 85.1* 37.0 - 80.0 % Final   • Lymphocyte % 11/12/2018 9.2* 10.0 - 50.0 % Final   • Monocyte % 11/12/2018 4.3  0.0 - 12.0 % Final   • Eosinophil % 11/12/2018 0.4  0.0 - 7.0 % Final   • Basophil % 11/12/2018 0.2  0.0 - 2.0 % Final   • Immature Grans % 11/12/2018 0.8* 0.0 - 0.5 % Final   • Neutrophils, Absolute 11/12/2018 8.95* 2.00 - 8.60 10*3/mm3 Final   • Lymphocytes, Absolute 11/12/2018 0.97  0.60 - 4.20 10*3/mm3 Final   • Monocytes, Absolute 11/12/2018 0.45  0.00 - 0.90 10*3/mm3 Final   • Eosinophils, Absolute 11/12/2018 0.04  0.00 - 0.70 10*3/mm3 Final   • Basophils, Absolute 11/12/2018 0.02  0.00 - 0.20 10*3/mm3 Final   • Immature Grans, Absolute 11/12/2018 0.08* 0.00 - 0.02 10*3/mm3 Final   ]

## 2019-02-11 ENCOUNTER — LAB (OUTPATIENT)
Dept: LAB | Facility: OTHER | Age: 84
End: 2019-02-11

## 2019-02-11 DIAGNOSIS — E11.22 TYPE 2 DIABETES MELLITUS WITH STAGE 3 CHRONIC KIDNEY DISEASE, WITHOUT LONG-TERM CURRENT USE OF INSULIN (HCC): Chronic | ICD-10-CM

## 2019-02-11 DIAGNOSIS — N18.30 TYPE 2 DIABETES MELLITUS WITH STAGE 3 CHRONIC KIDNEY DISEASE, WITHOUT LONG-TERM CURRENT USE OF INSULIN (HCC): Chronic | ICD-10-CM

## 2019-02-11 DIAGNOSIS — D50.9 IRON DEFICIENCY ANEMIA, UNSPECIFIED IRON DEFICIENCY ANEMIA TYPE: Chronic | ICD-10-CM

## 2019-02-11 DIAGNOSIS — C61 MALIGNANT TUMOR OF PROSTATE (HCC): Chronic | ICD-10-CM

## 2019-02-11 DIAGNOSIS — E78.2 MIXED HYPERLIPIDEMIA: Chronic | ICD-10-CM

## 2019-02-11 LAB
ALBUMIN SERPL-MCNC: 4.3 G/DL (ref 3.5–5)
ALBUMIN UR-MCNC: 1.5 MG/L
ALBUMIN/GLOB SERPL: 1.5 G/DL (ref 1.1–1.8)
ALP SERPL-CCNC: 276 U/L (ref 38–126)
ALT SERPL W P-5'-P-CCNC: 16 U/L
ANION GAP SERPL CALCULATED.3IONS-SCNC: 11 MMOL/L (ref 5–15)
AST SERPL-CCNC: 20 U/L (ref 17–59)
BASOPHILS # BLD AUTO: 0.04 10*3/MM3 (ref 0–0.2)
BASOPHILS NFR BLD AUTO: 0.4 % (ref 0–2)
BILIRUB SERPL-MCNC: 0.6 MG/DL (ref 0.2–1.3)
BUN BLD-MCNC: 38 MG/DL (ref 9–20)
BUN/CREAT SERPL: 18.4 (ref 7–25)
CALCIUM SPEC-SCNC: 9.2 MG/DL (ref 8.4–10.2)
CHLORIDE SERPL-SCNC: 104 MMOL/L (ref 98–107)
CHOLEST SERPL-MCNC: 192 MG/DL (ref 150–200)
CO2 SERPL-SCNC: 28 MMOL/L (ref 22–30)
CREAT BLD-MCNC: 2.07 MG/DL (ref 0.66–1.25)
CREAT UR-MCNC: 63.3 MG/DL
DEPRECATED RDW RBC AUTO: 52.3 FL (ref 35.1–43.9)
EOSINOPHIL # BLD AUTO: 0.18 10*3/MM3 (ref 0–0.7)
EOSINOPHIL NFR BLD AUTO: 1.8 % (ref 0–7)
ERYTHROCYTE [DISTWIDTH] IN BLOOD BY AUTOMATED COUNT: 14.6 % (ref 11.5–14.5)
FERRITIN SERPL-MCNC: 726 NG/ML (ref 17.9–464)
FOLATE SERPL-MCNC: 18.1 NG/ML (ref 2.76–21)
GFR SERPL CREATININE-BSD FRML MDRD: 30 ML/MIN/1.73 (ref 42–98)
GLOBULIN UR ELPH-MCNC: 2.9 GM/DL (ref 2.3–3.5)
GLUCOSE BLD-MCNC: 108 MG/DL (ref 74–99)
HBA1C MFR BLD: 5.2 % (ref 4–5.6)
HCT VFR BLD AUTO: 36.3 % (ref 39–49)
HDLC SERPL-MCNC: 45 MG/DL (ref 40–59)
HGB BLD-MCNC: 11.3 G/DL (ref 13.7–17.3)
IRON 24H UR-MRATE: 53 MCG/DL (ref 49–181)
IRON SATN MFR SERPL: 21 % (ref 20–55)
LDLC SERPL CALC-MCNC: 97 MG/DL
LDLC/HDLC SERPL: 2.16 {RATIO} (ref 0–3.55)
LYMPHOCYTES # BLD AUTO: 0.75 10*3/MM3 (ref 0.6–4.2)
LYMPHOCYTES NFR BLD AUTO: 7.4 % (ref 10–50)
MCH RBC QN AUTO: 31.5 PG (ref 26.5–34)
MCHC RBC AUTO-ENTMCNC: 31.1 G/DL (ref 31.5–36.3)
MCV RBC AUTO: 101.1 FL (ref 80–98)
MICROALBUMIN/CREAT UR: 23.7 MG/G (ref 0–30)
MONOCYTES # BLD AUTO: 0.68 10*3/MM3 (ref 0–0.9)
MONOCYTES NFR BLD AUTO: 6.7 % (ref 0–12)
NEUTROPHILS # BLD AUTO: 8.45 10*3/MM3 (ref 2–8.6)
NEUTROPHILS NFR BLD AUTO: 83.7 % (ref 37–80)
PLATELET # BLD AUTO: 245 10*3/MM3 (ref 150–450)
PMV BLD AUTO: 9.9 FL (ref 8–12)
POTASSIUM BLD-SCNC: 4.5 MMOL/L (ref 3.4–5)
PROT SERPL-MCNC: 7.2 G/DL (ref 6.3–8.2)
PSA SERPL-MCNC: 0.15 NG/ML (ref 0–4)
RBC # BLD AUTO: 3.59 10*6/MM3 (ref 4.37–5.74)
SODIUM BLD-SCNC: 143 MMOL/L (ref 137–145)
TIBC SERPL-MCNC: 258 MCG/DL (ref 261–462)
TRIGL SERPL-MCNC: 249 MG/DL
VIT B12 BLD-MCNC: 472 PG/ML (ref 239–931)
VLDLC SERPL-MCNC: 49.8 MG/DL
WBC NRBC COR # BLD: 10.1 10*3/MM3 (ref 3.2–9.8)

## 2019-02-11 PROCEDURE — 83036 HEMOGLOBIN GLYCOSYLATED A1C: CPT | Performed by: INTERNAL MEDICINE

## 2019-02-11 PROCEDURE — 85025 COMPLETE CBC W/AUTO DIFF WBC: CPT | Performed by: INTERNAL MEDICINE

## 2019-02-11 PROCEDURE — 80061 LIPID PANEL: CPT | Performed by: INTERNAL MEDICINE

## 2019-02-11 PROCEDURE — 83540 ASSAY OF IRON: CPT | Performed by: INTERNAL MEDICINE

## 2019-02-11 PROCEDURE — 82043 UR ALBUMIN QUANTITATIVE: CPT | Performed by: INTERNAL MEDICINE

## 2019-02-11 PROCEDURE — 82570 ASSAY OF URINE CREATININE: CPT | Performed by: INTERNAL MEDICINE

## 2019-02-11 PROCEDURE — 83550 IRON BINDING TEST: CPT | Performed by: INTERNAL MEDICINE

## 2019-02-11 PROCEDURE — 84153 ASSAY OF PSA TOTAL: CPT | Performed by: INTERNAL MEDICINE

## 2019-02-11 PROCEDURE — 80053 COMPREHEN METABOLIC PANEL: CPT | Performed by: INTERNAL MEDICINE

## 2019-02-11 PROCEDURE — 36415 COLL VENOUS BLD VENIPUNCTURE: CPT | Performed by: INTERNAL MEDICINE

## 2019-02-11 PROCEDURE — 82728 ASSAY OF FERRITIN: CPT | Performed by: INTERNAL MEDICINE

## 2019-02-11 PROCEDURE — 82746 ASSAY OF FOLIC ACID SERUM: CPT | Performed by: INTERNAL MEDICINE

## 2019-02-11 PROCEDURE — 82607 VITAMIN B-12: CPT | Performed by: INTERNAL MEDICINE

## 2019-02-18 ENCOUNTER — OFFICE VISIT (OUTPATIENT)
Dept: FAMILY MEDICINE CLINIC | Facility: CLINIC | Age: 84
End: 2019-02-18

## 2019-02-18 VITALS
HEART RATE: 64 BPM | DIASTOLIC BLOOD PRESSURE: 80 MMHG | BODY MASS INDEX: 24.56 KG/M2 | SYSTOLIC BLOOD PRESSURE: 138 MMHG | HEIGHT: 69 IN | TEMPERATURE: 97.6 F | WEIGHT: 165.8 LBS

## 2019-02-18 DIAGNOSIS — I87.2 PERIPHERAL VENOUS INSUFFICIENCY: Chronic | ICD-10-CM

## 2019-02-18 DIAGNOSIS — N18.30 TYPE 2 DIABETES MELLITUS WITH STAGE 3 CHRONIC KIDNEY DISEASE, WITHOUT LONG-TERM CURRENT USE OF INSULIN (HCC): Primary | Chronic | ICD-10-CM

## 2019-02-18 DIAGNOSIS — C61 MALIGNANT TUMOR OF PROSTATE (HCC): Chronic | ICD-10-CM

## 2019-02-18 DIAGNOSIS — D51.9 ANEMIA DUE TO VITAMIN B12 DEFICIENCY, UNSPECIFIED B12 DEFICIENCY TYPE: ICD-10-CM

## 2019-02-18 DIAGNOSIS — I73.9 PERIPHERAL VASCULAR DISEASE (HCC): Chronic | ICD-10-CM

## 2019-02-18 DIAGNOSIS — I25.10 CORONARY ARTERIOSCLEROSIS: Chronic | ICD-10-CM

## 2019-02-18 DIAGNOSIS — D50.8 OTHER IRON DEFICIENCY ANEMIA: Chronic | ICD-10-CM

## 2019-02-18 DIAGNOSIS — I10 ESSENTIAL HYPERTENSION: Chronic | ICD-10-CM

## 2019-02-18 DIAGNOSIS — E78.2 MIXED HYPERLIPIDEMIA: Chronic | ICD-10-CM

## 2019-02-18 DIAGNOSIS — K21.9 GASTROESOPHAGEAL REFLUX DISEASE WITHOUT ESOPHAGITIS: Chronic | ICD-10-CM

## 2019-02-18 DIAGNOSIS — I87.2 VENOUS STASIS ULCER OF RIGHT CALF WITH FAT LAYER EXPOSED WITHOUT VARICOSE VEINS (HCC): ICD-10-CM

## 2019-02-18 DIAGNOSIS — L97.212 VENOUS STASIS ULCER OF RIGHT CALF WITH FAT LAYER EXPOSED WITHOUT VARICOSE VEINS (HCC): ICD-10-CM

## 2019-02-18 DIAGNOSIS — E11.22 TYPE 2 DIABETES MELLITUS WITH STAGE 3 CHRONIC KIDNEY DISEASE, WITHOUT LONG-TERM CURRENT USE OF INSULIN (HCC): Primary | Chronic | ICD-10-CM

## 2019-02-18 DIAGNOSIS — N18.30 CHRONIC KIDNEY DISEASE, STAGE 3 (HCC): Chronic | ICD-10-CM

## 2019-02-18 PROCEDURE — 96372 THER/PROPH/DIAG INJ SC/IM: CPT | Performed by: INTERNAL MEDICINE

## 2019-02-18 PROCEDURE — 99214 OFFICE O/P EST MOD 30 MIN: CPT | Performed by: INTERNAL MEDICINE

## 2019-02-18 RX ORDER — HYDROCODONE BITARTRATE AND ACETAMINOPHEN 7.5; 325 MG/1; MG/1
TABLET ORAL
Qty: 30 TABLET | Refills: 0 | Status: SHIPPED | OUTPATIENT
Start: 2019-02-18 | End: 2019-06-24

## 2019-02-18 RX ORDER — HYDROCODONE BITARTRATE AND ACETAMINOPHEN 7.5; 325 MG/1; MG/1
TABLET ORAL
Qty: 20 TABLET | Refills: 0 | Status: CANCELLED | OUTPATIENT
Start: 2019-02-18

## 2019-02-18 RX ORDER — CYANOCOBALAMIN 1000 UG/ML
1000 INJECTION, SOLUTION INTRAMUSCULAR; SUBCUTANEOUS
Status: SHIPPED | OUTPATIENT
Start: 2019-02-18

## 2019-02-18 RX ADMIN — CYANOCOBALAMIN 1000 MCG: 1000 INJECTION, SOLUTION INTRAMUSCULAR; SUBCUTANEOUS at 14:13

## 2019-02-18 NOTE — PROGRESS NOTES
Subjective        History of Present Illness     Sy Florez (Grant) is a 89 y.o. male who presents for 6-month follow up of CKD, CAD, PVD, PAD, hypertension, high cholesterol, iron deficiency anemia, and diet-controlled type 2 diabetes, among other issues. He continues to follow with Dr. Mccord for iron deficiency anemia. CBC reveals improved hemoglobin, although, B-12 has drifted down to 472.   He sustained a fall in January resulting in right elbow/olecranon fracture and hairline fracture of anteromedial aspect of right hip.  He continues to ambulate with a walker, although, the right hip pain is gradually improving.  He takes one Lortab nightly due to the hip pain.  He has also added Tylenol and continues on Neurontin.  He denies significant side effects including constipation or excessive daytime sedation.      He struggles with reports extensive vascular disease and developed a venous stasis ulcer to the right posterior calf last month. The venous stasis ulcer to the right posterior calf appears very shallow with new granulation tissue forming. He continues to have evidence of chronic venous insufficiency and peripheral vascular disease.  He continues to clean the area with saline and dress with Polymem and is asking about leaving it uncovered.  He has some Bactroban at home he can apply and cover with non-stick gauze.      I had patient resume his Crestor last month after he stopped it due to lower extremity leg cramps.   He reports he is averaging taking the Crestor about twice weekly.  I recommended he continue the Crestor twice weekly taking breaks if leg cramps/myalgias recur.      Weight is down 2 pounds in the past six months.  Blood pressure at goal.      The patient's relevant past medical, surgical, and social history was reviewed in Epic.   Lab results are reviewed with the patient today.  CBC reveals improved hemoglobin, although, B-12 has drifted down to 472. Fasting glucose 108. A1c is 5.2.   "Renal function improved with creatinine 2.0 decreased from 2.3. Total cholesterol 192.  HDL 45.  LDL 97.  Triglycerides 249.  LDL/HDL ratio 2.16.     Review of Systems   Constitutional: Negative for chills, fatigue and fever.   HENT: Negative for congestion, ear pain, postnasal drip, sinus pressure and sore throat.    Respiratory: Negative for cough, shortness of breath and wheezing.    Cardiovascular: Negative for chest pain, palpitations and leg swelling.   Gastrointestinal: Negative for abdominal pain, blood in stool, constipation, diarrhea, nausea and vomiting.   Endocrine: Negative for cold intolerance, heat intolerance, polydipsia and polyuria.   Genitourinary: Negative for dysuria, frequency, hematuria and urgency.   Skin: Negative for rash.   Neurological: Negative for syncope and weakness.          Objective     Vitals:    02/18/19 1332   BP: 138/80   Pulse: 64   Temp: 97.6 °F (36.4 °C)   TempSrc: Oral   Weight: 75.2 kg (165 lb 12.8 oz)   Height: 175.3 cm (69\")     Physical Exam   Constitutional: He is oriented to person, place, and time. He appears well-developed and well-nourished. No distress.   Ambulating with a walker today.     HENT:   Head: Normocephalic and atraumatic.   Nose: Right sinus exhibits no maxillary sinus tenderness and no frontal sinus tenderness. Left sinus exhibits no maxillary sinus tenderness and no frontal sinus tenderness.   Mouth/Throat: Uvula is midline, oropharynx is clear and moist and mucous membranes are normal. No oral lesions. No tonsillar exudate.   Eyes: Conjunctivae and EOM are normal. Pupils are equal, round, and reactive to light.   Neck: Trachea normal. Neck supple. No JVD present. Carotid bruit is not present. No tracheal deviation present. No thyroid mass and no thyromegaly present.   Cardiovascular: Normal rate, regular rhythm, normal heart sounds and intact distal pulses.  No extrasystoles are present. PMI is not displaced.   No murmur heard.  Pulmonary/Chest: " Effort normal and breath sounds normal. No accessory muscle usage. No respiratory distress. He has no decreased breath sounds. He has no wheezes. He has no rhonchi. He has no rales.   Abdominal: Soft. Bowel sounds are normal. He exhibits no distension. There is no hepatosplenomegaly. There is no tenderness.     Vascular Status -  His right foot exhibits abnormal foot vasculature  (Trace edema bilateral lower extremities wtih evidence of chronic venous insufficiency. ). His right foot exhibits no edema. His left foot exhibits abnormal foot vasculature . His left foot exhibits no edema.  Lymphadenopathy:     He has no cervical adenopathy.   Neurological: He is alert and oriented to person, place, and time. No cranial nerve deficit. Coordination normal.   Skin: Skin is warm, dry and intact. No rash noted. No cyanosis. Nails show no clubbing.   The venous stasis ulcer to the right posterior calf is very shallow with new granulation tissue. He continues to have evidence of chronic venous insufficiency and peripheral vascular disease.     Sun-damaged skin.       Psychiatric: He has a normal mood and affect. His speech is normal and behavior is normal. Judgment and thought content normal.   Vitals reviewed.      Assessment/Plan      Continue omeprazole for GERD.    He is given a vitamin B-12 injection today.  Hemoglobin continues to improve.  We will continue to monitor.         Continue low dose prednisone 5 mg daily    I recommended he continue the Crestor twice weekly taking breaks if leg cramps/myalgias recur.     Refill is given for Norco 7.5/325 mg to take one nightly.  Continue the Neurontin. Patient understands the risks associated with this controlled medication, including tolerance and addiction.  He also agrees to only obtain this medication from me, and not from a another provider, unless that provider is covering for me in my absence.  He also agrees to be compliant in dosing, and not self adjust the dose of  medication.  A signed controlled substance agreement is on file, and he has received a controlled substance education sheet at this a previous visit.  He has also signed a consent for treatment with a controlled substance as per [facility] policy. TRAE was obtained.     He wants to try something different for the venous stasis ulcer; has been using polymem. Recommended he apply a thin coat of Bactroban ointment and apply non-stick gauze to the venous stasis ulcer to the right posterior calf.  Continue use of compression stockings to help manage chronic venous insufficiency and extensive vascular disease.        We continue to avoid NSAIDs and other nephrotoxic drugs due to CKD.      Continue other medications and vitamin and mineral supplements to treat additional medical problems which we addressed today.  Return in four months for follow up with fasting labs one week prior.     Scribed for Dr. Rodriguez by Johanna Reyes [facility].     Diagnoses and all orders for this visit:    Type 2 diabetes mellitus with stage 3 chronic kidney disease, without long-term current use of insulin (CMS/HCC)  -     CBC Auto Differential; Future  -     Comprehensive Metabolic Panel; Future  -     Hemoglobin A1c; Future  -     LDL Cholesterol, Direct; Future  -     Triglycerides; Future  -     TSH; Future    Essential hypertension    Mixed hyperlipidemia  -     LDL Cholesterol, Direct; Future  -     Triglycerides; Future    Coronary arteriosclerosis    Peripheral vascular disease (CMS/HCC)    Peripheral venous insufficiency    Gastroesophageal reflux disease without esophagitis    Chronic kidney disease, stage 3 (CMS/HCC)    Malignant tumor of prostate (CMS/HCC)    Other iron deficiency anemia  -     Ferritin; Future  -     Iron Profile; Future    Venous stasis ulcer of right calf with fat layer exposed without varicose veins (CMS/HCC)    Anemia due to vitamin B12 deficiency, unspecified B12 deficiency type  -     cyanocobalamin  injection 1,000 mcg; Inject 1 mL into the appropriate muscle as directed by prescriber Every 28 (Twenty-Eight) Days.  -     Vitamin B12; Future    Other orders  -     Cancel: HYDROcodone-acetaminophen (NORCO) 7.5-325 MG per tablet; 1/2-1 po QID prn pain.  Use sparingly  -     HYDROcodone-acetaminophen (NORCO) 7.5-325 MG per tablet; 1/2-1 po QID prn pain.  Use sparingly        Lab on 02/11/2019   Component Date Value Ref Range Status   • WBC 02/11/2019 10.10* 3.20 - 9.80 10*3/mm3 Final   • RBC 02/11/2019 3.59* 4.37 - 5.74 10*6/mm3 Final   • Hemoglobin 02/11/2019 11.3* 13.7 - 17.3 g/dL Final   • Hematocrit 02/11/2019 36.3* 39.0 - 49.0 % Final   • MCV 02/11/2019 101.1* 80.0 - 98.0 fL Final   • MCH 02/11/2019 31.5  26.5 - 34.0 pg Final   • MCHC 02/11/2019 31.1* 31.5 - 36.3 g/dL Final   • RDW 02/11/2019 14.6* 11.5 - 14.5 % Final   • RDW-SD 02/11/2019 52.3* 35.1 - 43.9 fl Final   • MPV 02/11/2019 9.9  8.0 - 12.0 fL Final   • Platelets 02/11/2019 245  150 - 450 10*3/mm3 Final   • Neutrophil % 02/11/2019 83.7* 37.0 - 80.0 % Final   • Lymphocyte % 02/11/2019 7.4* 10.0 - 50.0 % Final   • Monocyte % 02/11/2019 6.7  0.0 - 12.0 % Final   • Eosinophil % 02/11/2019 1.8  0.0 - 7.0 % Final   • Basophil % 02/11/2019 0.4  0.0 - 2.0 % Final   • Neutrophils, Absolute 02/11/2019 8.45  2.00 - 8.60 10*3/mm3 Final   • Lymphocytes, Absolute 02/11/2019 0.75  0.60 - 4.20 10*3/mm3 Final   • Monocytes, Absolute 02/11/2019 0.68  0.00 - 0.90 10*3/mm3 Final   • Eosinophils, Absolute 02/11/2019 0.18  0.00 - 0.70 10*3/mm3 Final   • Basophils, Absolute 02/11/2019 0.04  0.00 - 0.20 10*3/mm3 Final   • Glucose 02/11/2019 108* 74 - 99 mg/dL Final   • BUN 02/11/2019 38* 9 - 20 mg/dL Final   • Creatinine 02/11/2019 2.07* 0.66 - 1.25 mg/dL Final   • Sodium 02/11/2019 143  137 - 145 mmol/L Final   • Potassium 02/11/2019 4.5  3.4 - 5.0 mmol/L Final   • Chloride 02/11/2019 104  98 - 107 mmol/L Final   • CO2 02/11/2019 28.0  22.0 - 30.0 mmol/L Final   • Calcium  02/11/2019 9.2  8.4 - 10.2 mg/dL Final   • Total Protein 02/11/2019 7.2  6.3 - 8.2 g/dL Final   • Albumin 02/11/2019 4.30  3.50 - 5.00 g/dL Final   • ALT (SGPT) 02/11/2019 16  <=50 U/L Final   • AST (SGOT) 02/11/2019 20  17 - 59 U/L Final   • Alkaline Phosphatase 02/11/2019 276* 38 - 126 U/L Final   • Total Bilirubin 02/11/2019 0.6  0.2 - 1.3 mg/dL Final   • eGFR Non African Amer 02/11/2019 30* 42 - 98 mL/min/1.73 Final   • Globulin 02/11/2019 2.9  2.3 - 3.5 gm/dL Final   • A/G Ratio 02/11/2019 1.5  1.1 - 1.8 g/dL Final   • BUN/Creatinine Ratio 02/11/2019 18.4  7.0 - 25.0 Final   • Anion Gap 02/11/2019 11.0  5.0 - 15.0 mmol/L Final   • Ferritin 02/11/2019 726.00* 17.90 - 464.00 ng/mL Final   • Hemoglobin A1C 02/11/2019 5.2  4 - 5.6 % Final   • Iron 02/11/2019 53  49 - 181 mcg/dL Final   • TIBC 02/11/2019 258* 261 - 462 mcg/dL Final   • Iron Saturation 02/11/2019 21  20 - 55 % Final   • Total Cholesterol 02/11/2019 192  150 - 200 mg/dL Final   • Triglycerides 02/11/2019 249* <=150 mg/dL Final   • HDL Cholesterol 02/11/2019 45  40 - 59 mg/dL Final   • LDL Cholesterol  02/11/2019 97  <=100 mg/dL Final   • VLDL Cholesterol 02/11/2019 49.8  mg/dL Final   • LDL/HDL Ratio 02/11/2019 2.16  0.00 - 3.55 Final   • Microalbumin/Creatinine Ratio 02/11/2019 23.7  0.0 - 30.0 mg/g Final   • Creatinine, Urine 02/11/2019 63.3  mg/dL Final   • Microalbumin, Urine 02/11/2019 1.5  mg/L Final   • PSA 02/11/2019 0.151  0.000 - 4.000 ng/mL Final   • Vitamin B-12 02/11/2019 472  239 - 931 pg/mL Final   • Folate 02/11/2019 18.10  2.76 - 21.00 ng/mL Final   ]

## 2019-03-05 RX ORDER — OMEPRAZOLE 40 MG/1
40 CAPSULE, DELAYED RELEASE ORAL EVERY MORNING
Qty: 30 CAPSULE | Refills: 11 | Status: SHIPPED | OUTPATIENT
Start: 2019-03-05 | End: 2020-02-10

## 2019-03-13 ENCOUNTER — LAB (OUTPATIENT)
Dept: ONCOLOGY | Facility: HOSPITAL | Age: 84
End: 2019-03-13

## 2019-03-13 ENCOUNTER — INFUSION (OUTPATIENT)
Dept: ONCOLOGY | Facility: HOSPITAL | Age: 84
End: 2019-03-13

## 2019-03-13 ENCOUNTER — OFFICE VISIT (OUTPATIENT)
Dept: ONCOLOGY | Facility: CLINIC | Age: 84
End: 2019-03-13

## 2019-03-13 VITALS
DIASTOLIC BLOOD PRESSURE: 66 MMHG | BODY MASS INDEX: 24.53 KG/M2 | HEART RATE: 51 BPM | RESPIRATION RATE: 18 BRPM | HEIGHT: 69 IN | TEMPERATURE: 97.5 F | SYSTOLIC BLOOD PRESSURE: 140 MMHG | WEIGHT: 165.6 LBS

## 2019-03-13 DIAGNOSIS — IMO0001 IRON AND ITS COMPOUNDS CAUSING ADVERSE EFFECT IN THERAPEUTIC USE, INITIAL ENCOUNTER: ICD-10-CM

## 2019-03-13 DIAGNOSIS — D50.8 OTHER IRON DEFICIENCY ANEMIA: ICD-10-CM

## 2019-03-13 DIAGNOSIS — E53.8 B12 DEFICIENCY: Primary | ICD-10-CM

## 2019-03-13 DIAGNOSIS — E53.8 B12 DEFICIENCY: ICD-10-CM

## 2019-03-13 LAB
BASOPHILS # BLD AUTO: 0.06 10*3/MM3 (ref 0–0.2)
BASOPHILS NFR BLD AUTO: 0.6 % (ref 0–1.5)
DEPRECATED RDW RBC AUTO: 49.1 FL (ref 37–54)
EOSINOPHIL # BLD AUTO: 0.08 10*3/MM3 (ref 0–0.4)
EOSINOPHIL NFR BLD AUTO: 0.7 % (ref 0.3–6.2)
ERYTHROCYTE [DISTWIDTH] IN BLOOD BY AUTOMATED COUNT: 14.2 % (ref 12.3–15.4)
FERRITIN SERPL-MCNC: 638 NG/ML (ref 17.9–464)
HCT VFR BLD AUTO: 36.1 % (ref 37.5–51)
HGB BLD-MCNC: 11.1 G/DL (ref 13–17.7)
IMM GRANULOCYTES # BLD AUTO: 0.06 10*3/MM3 (ref 0–0.05)
IMM GRANULOCYTES NFR BLD AUTO: 0.6 % (ref 0–0.5)
IRON 24H UR-MRATE: 69 MCG/DL (ref 49–181)
IRON SATN MFR SERPL: 26 % (ref 20–55)
LYMPHOCYTES # BLD AUTO: 1.02 10*3/MM3 (ref 0.7–3.1)
LYMPHOCYTES NFR BLD AUTO: 9.5 % (ref 19.6–45.3)
MCH RBC QN AUTO: 29.2 PG (ref 26.6–33)
MCHC RBC AUTO-ENTMCNC: 30.7 G/DL (ref 31.5–35.7)
MCV RBC AUTO: 95 FL (ref 79–97)
MONOCYTES # BLD AUTO: 0.52 10*3/MM3 (ref 0.1–0.9)
MONOCYTES NFR BLD AUTO: 4.8 % (ref 5–12)
NEUTROPHILS # BLD AUTO: 9 10*3/MM3 (ref 1.4–7)
NEUTROPHILS NFR BLD AUTO: 83.8 % (ref 42.7–76)
NRBC BLD AUTO-RTO: 0 /100 WBC (ref 0–0)
PLATELET # BLD AUTO: 244 10*3/MM3 (ref 140–450)
PMV BLD AUTO: 10.4 FL (ref 6–12)
RBC # BLD AUTO: 3.8 10*6/MM3 (ref 4.14–5.8)
TIBC SERPL-MCNC: 265 MCG/DL (ref 261–462)
WBC NRBC COR # BLD: 10.74 10*3/MM3 (ref 3.4–10.8)

## 2019-03-13 PROCEDURE — 85025 COMPLETE CBC W/AUTO DIFF WBC: CPT | Performed by: NURSE PRACTITIONER

## 2019-03-13 PROCEDURE — 36415 COLL VENOUS BLD VENIPUNCTURE: CPT | Performed by: NURSE PRACTITIONER

## 2019-03-13 PROCEDURE — 99214 OFFICE O/P EST MOD 30 MIN: CPT | Performed by: NURSE PRACTITIONER

## 2019-03-13 PROCEDURE — G0463 HOSPITAL OUTPT CLINIC VISIT: HCPCS | Performed by: NURSE PRACTITIONER

## 2019-03-13 PROCEDURE — 82728 ASSAY OF FERRITIN: CPT | Performed by: NURSE PRACTITIONER

## 2019-03-13 PROCEDURE — 83550 IRON BINDING TEST: CPT | Performed by: NURSE PRACTITIONER

## 2019-03-13 PROCEDURE — 83540 ASSAY OF IRON: CPT | Performed by: NURSE PRACTITIONER

## 2019-03-13 RX ORDER — CYANOCOBALAMIN 1000 UG/ML
1000 INJECTION, SOLUTION INTRAMUSCULAR; SUBCUTANEOUS ONCE
Status: DISCONTINUED | OUTPATIENT
Start: 2019-03-13 | End: 2019-03-13

## 2019-03-13 RX ORDER — CYANOCOBALAMIN 1000 UG/ML
1000 INJECTION, SOLUTION INTRAMUSCULAR; SUBCUTANEOUS ONCE
Status: CANCELLED | OUTPATIENT
Start: 2019-03-13

## 2019-03-14 NOTE — PROGRESS NOTES
DATE OF VISIT: 3/13/2019    REASON FOR VISIT:  Follow-up for iron deficiency anemia    HISTORY OF PRESENT ILLNESS:  89-year-old male with a past medical history significant for history of peripheral vascular disease status post femoropopliteal bypass, prostate cancer status post seed in 2001, history of coronary artery disease status post CABG was initially seen in consultation on December 20, 2017 for evaluation of iron deficiency anemia and not able to tolerate borderline.  Subsequently patient received 2 dose of intravenous Feraheme last of which was on January 11, 2018. His iron level improved. Recently iron levels dropped again and he was given 2 additional doses of Feraheme. His stool was checked and was negative; referral to GI was placed but patient refused to go.    Since he was here last he states he had a fall over [place] and had hip fracture and elbow fracture; elbow required surgery; he was in rehab for quite some time. He states he was given iron infusion while in hospital at [facility].     He denies any blood in stool or urine. Denies any SOA.    PAST MEDICAL HISTORY:    Past Medical History:   Diagnosis Date   • Actinic keratosis    • Acute prostatitis     h/o 2014    • Anemia      New problem noted with preoperative labs, 10/2015.. Anemia workup initiated, 10/2015      • Arthritis      Possible inflammatory arthritis bilateral knees   • Bursitis of hip     right greater trochanteric bursitis, spring 2014   • Cellulitis of lower limb     mild bilateral lower extremities   • Chronic kidney disease, stage 3 (CMS/[facility])     - cr =1.8-2.0, gradually worsening   • Claudication (CMS/[facility])     dr. you s/p numerous revascularization. medical management   • Coronary arteriosclerosis     dr. perez   • Degenerative joint disease involving multiple joints    • Disorder of lumbar spine     Lumbar laminectomy at [facility], 11/4/2015      • Dyslipidemia     Resumed statin therapy  1/2015. Lipitor   • Edema of lower extremity      Etiology undetermined. Chronic venous insufficiency is playing a role   • Essential hypertension    • Eustachian tube disorder    • Gastroesophageal reflux disease    • Hyperlipidemia     Resumed extended interval Lipitor, 7/2015   • Idiopathic peripheral neuropathy     Gradually increasing Neurontin. Referred to Dr. Vergara, neurologist, 4/2015      • IFG (impaired fasting glucose)    • Iron deficiency anemia      New problem noted with preoperative labs, 10/2015.. Anemia workup initiated, 10/2015      • Leg cramp     nocturnal leg cramps   • Malaise and fatigue    • Malignant tumor of prostate (CMS/HCC)     6/2001 s/p brachytherapy   • Peripheral vascular disease (CMS/HCC)     PAD/claudication. Dr. Brennan      • Peripheral venous insufficiency     Chronic venous insufficiency bilateral lower extremities, left worse than right      • Pneumonia     H/O   • Pruritic rash     Autoimmune. Responds to methotrexate. Dr. Oliveros, dermatology. The patient stopped the methotrexate and declines resumption. Started low-dose prednisone, 4/2016      • Type 2 diabetes mellitus (CMS/HCC)     Newly diagnosed, 8/2015. Started Amaryl, 8/2015. Metformin and pioglitizone contraindicated   • Type 2 diabetes mellitus with chronic kidney disease, without long-term current use of insulin (CMS/HCC)     Newly diagnosed, 8/2015. Started Amaryl, 8/2015. Metformin and pioglitizone contraindicated   • UTI (urinary tract infection)     H/O,site not specified-possible       SOCIAL HISTORY:    Social History     Tobacco Use   • Smoking status: Former Smoker     Types: Cigarettes   • Smokeless tobacco: Never Used   • Tobacco comment: 40 years ago   Substance Use Topics   • Alcohol use: No   • Drug use: No       Surgical History :  Past Surgical History:   Procedure Laterality Date   • CORONARY ARTERY BYPASS GRAFT      vein,two  09/2006   • ENDOSCOPY AND COLONOSCOPY      7/2011 declines   • OTHER  "SURGICAL HISTORY      femoral-popliteal bypass graft - right leg 1997/left leg 2007   • OTHER SURGICAL HISTORY      laser surgery of prostate, - radiation implant;  Last Performed: 06/2001   • OTHER SURGICAL HISTORY      low back disk surgery , - Lumbar laminectomy with L4/L5 mechanical fusion;  Last Performed: 11/2015   • OTHER SURGICAL HISTORY      remove impacted cerumen 4/06/16       ALLERGIES:    Allergies   Allergen Reactions   • Bacitracin Rash   • Formaldehyde Rash   • Framycetin Rash     All Fragrances   • Neomycin Rash   • Nickel Rash       REVIEW OF SYSTEMS:      CONSTITUTIONAL:  No fever, chills, or night sweats.     HEENT:  No epistaxis, mouth sores, or difficulty swallowing.    RESPIRATORY:  No new shortness of breath or cough at present.    CARDIOVASCULAR:  No chest pain or palpitations.    GASTROINTESTINAL:  No abdominal pain, nausea, vomiting, or blood in the stool.    GENITOURINARY:  No dysuria or hematuria.    MUSCULOSKELETAL:  No any new back pain or arthralgias.     NEUROLOGICAL:  No tingling or numbness. No new headache or dizziness.     LYMPHATICS:  Denies any abnormal swollen and anywhere in the body.    SKIN:  Denies any new skin rash.    PHYSICAL EXAMINATION:      VITAL SIGNS:  /66   Pulse 51   Temp 97.5 °F (36.4 °C) (Temporal)   Resp 18   Ht 175.3 cm (69.02\")   Wt 75.1 kg (165 lb 9.6 oz)   BMI 24.44 kg/m²     GENERAL:  Not in any distress.    HEENT:  Normocephalic, Atraumatic.Mild Conjunctival pallor. No icterus. Extraocular Movements Intact. No Facial Asymmetry noted.    NECK:  No adenopathy. No JVD.    RESPIRATORY:  Fair air entry bilateral. No rhonchi or wheezing.    CARDIOVASCULAR:  S1, S2. Regular rate and rhythm. No murmur or gallop appreciated.    ABDOMEN:  Soft, obese, nontender. Bowel sounds present in all four quadrants.  No organomegaly appreciated.    EXTREMITIES:  No edema.No Calf Tenderness.    NEUROLOGIC:  Alert, awake and oriented ×3.  No  Motor or sensory " deficit appreciated. Cranial Nerves 2-12 grossly intact.    SKIN : No new skin lesion identified  DIAGNOSTIC DATA:    Glucose   Date Value Ref Range Status   02/11/2019 108 (H) 74 - 99 mg/dL Final     Sodium   Date Value Ref Range Status   02/11/2019 143 137 - 145 mmol/L Final     Potassium   Date Value Ref Range Status   02/11/2019 4.5 3.4 - 5.0 mmol/L Final     CO2   Date Value Ref Range Status   02/11/2019 28.0 22.0 - 30.0 mmol/L Final     Chloride   Date Value Ref Range Status   02/11/2019 104 98 - 107 mmol/L Final     Anion Gap   Date Value Ref Range Status   02/11/2019 11.0 5.0 - 15.0 mmol/L Final     Creatinine   Date Value Ref Range Status   02/11/2019 2.07 (H) 0.66 - 1.25 mg/dL Final     BUN   Date Value Ref Range Status   02/11/2019 38 (H) 9 - 20 mg/dL Final     BUN/Creatinine Ratio   Date Value Ref Range Status   02/11/2019 18.4 7.0 - 25.0 Final     Calcium   Date Value Ref Range Status   02/11/2019 9.2 8.4 - 10.2 mg/dL Final     eGFR Non  Amer   Date Value Ref Range Status   02/11/2019 30 (L) 42 - 98 mL/min/1.73 Final     Alkaline Phosphatase   Date Value Ref Range Status   02/11/2019 276 (H) 38 - 126 U/L Final     Total Protein   Date Value Ref Range Status   02/11/2019 7.2 6.3 - 8.2 g/dL Final     ALT (SGPT)   Date Value Ref Range Status   02/11/2019 16 <=50 U/L Final     AST (SGOT)   Date Value Ref Range Status   02/11/2019 20 17 - 59 U/L Final     Total Bilirubin   Date Value Ref Range Status   02/11/2019 0.6 0.2 - 1.3 mg/dL Final     Albumin   Date Value Ref Range Status   02/11/2019 4.30 3.50 - 5.00 g/dL Final     Globulin   Date Value Ref Range Status   02/11/2019 2.9 2.3 - 3.5 gm/dL Final     Lab Results   Component Value Date    WBC 10.74 03/13/2019    HGB 11.1 (L) 03/13/2019    HCT 36.1 (L) 03/13/2019    MCV 95.0 03/13/2019     03/13/2019     Lab Results   Component Value Date    NEUTROABS 9.00 (H) 03/13/2019    IRON 69 03/13/2019    TIBC 265 03/13/2019    LABIRON 26 03/13/2019     FERRITIN 638.00 (H) 03/13/2019    JVGVALPF04 472 02/11/2019    FOLATE 18.10 02/11/2019     No results found for: , LABCA2, AFPTM, HCGQUANT, , CHROMGRNA, [id number], CEA, REFLABREPO]        ASSESSMENT AND PLAN:       1. Iron deficiency anemia secondary to possible GI bleed and component of CKD stage III;  He received IV iron back in January 2018 due to his inability to tolerate PO iron; his iron levels improved; recently levels dropped again and he was given 2 additional doses of iron; stool occult was negative and GI referral was placed but pt refused to go. His Hgb and iron studies improved. Recently had a fall and was hospitalized for quite some time; he was given iron infusion during hospitalization.His Hgb today is 11.1; iron studies have improved as well. No need for any additional IV iron; will plan to recheck him again 4 mos with repeat CBC and iron studies.     2.  Chronic kidney disease stage III; pt states he has a nephrologist that he has seen at [facility]; creat level today is pending.     3.  History of prostate cancer status post seed implant.Being followed by different provider     4.  History of peripheral vascular disease status post femoropopliteal bypass. Follows with cardiologist in [place].    Patient's Body mass index is 24.44 kg/m². BMI is within normal parameters. No follow-up required..        This document has been signed by JUAN M Hernandez on March 14, 2019 10:51 AM

## 2019-05-07 RX ORDER — PREDNISONE 1 MG/1
TABLET ORAL
Qty: 60 TABLET | Refills: 11 | Status: SHIPPED | OUTPATIENT
Start: 2019-05-07 | End: 2020-06-29

## 2019-06-18 RX ORDER — RAMIPRIL 5 MG/1
CAPSULE ORAL
Qty: 30 CAPSULE | Refills: 5 | Status: SHIPPED | OUTPATIENT
Start: 2019-06-18 | End: 2020-05-11

## 2019-06-19 ENCOUNTER — LAB (OUTPATIENT)
Dept: LAB | Facility: OTHER | Age: 84
End: 2019-06-19

## 2019-06-19 DIAGNOSIS — N18.30 TYPE 2 DIABETES MELLITUS WITH STAGE 3 CHRONIC KIDNEY DISEASE, WITHOUT LONG-TERM CURRENT USE OF INSULIN (HCC): ICD-10-CM

## 2019-06-19 DIAGNOSIS — D50.8 OTHER IRON DEFICIENCY ANEMIA: Chronic | ICD-10-CM

## 2019-06-19 DIAGNOSIS — E11.22 TYPE 2 DIABETES MELLITUS WITH STAGE 3 CHRONIC KIDNEY DISEASE, WITHOUT LONG-TERM CURRENT USE OF INSULIN (HCC): ICD-10-CM

## 2019-06-19 DIAGNOSIS — D51.9 ANEMIA DUE TO VITAMIN B12 DEFICIENCY, UNSPECIFIED B12 DEFICIENCY TYPE: ICD-10-CM

## 2019-06-19 DIAGNOSIS — E78.2 MIXED HYPERLIPIDEMIA: Chronic | ICD-10-CM

## 2019-06-19 LAB
ALBUMIN SERPL-MCNC: 4.2 G/DL (ref 3.5–5)
ALBUMIN/GLOB SERPL: 1.5 G/DL (ref 1.1–1.8)
ALP SERPL-CCNC: 202 U/L (ref 38–126)
ALT SERPL W P-5'-P-CCNC: 15 U/L
ANION GAP SERPL CALCULATED.3IONS-SCNC: 11 MMOL/L (ref 5–15)
ANISOCYTOSIS BLD QL: NORMAL
ARTICHOKE IGE QN: 166 MG/DL (ref 0–100)
AST SERPL-CCNC: 19 U/L (ref 17–59)
BILIRUB SERPL-MCNC: 0.2 MG/DL (ref 0.2–1.3)
BUN BLD-MCNC: 49 MG/DL (ref 7–23)
BUN/CREAT SERPL: 22.6 (ref 7–25)
CALCIUM SPEC-SCNC: 9.8 MG/DL (ref 8.4–10.2)
CHLORIDE SERPL-SCNC: 110 MMOL/L (ref 101–112)
CO2 SERPL-SCNC: 23 MMOL/L (ref 22–30)
CREAT BLD-MCNC: 2.17 MG/DL (ref 0.7–1.3)
DEPRECATED RDW RBC AUTO: 52 FL (ref 37–54)
EOSINOPHIL # BLD MANUAL: 0.39 10*3/MM3 (ref 0–0.4)
EOSINOPHIL NFR BLD MANUAL: 5 % (ref 0.3–6.2)
ERYTHROCYTE [DISTWIDTH] IN BLOOD BY AUTOMATED COUNT: 15 % (ref 12.3–15.4)
FERRITIN SERPL-MCNC: 446 NG/ML (ref 30–400)
GFR SERPL CREATININE-BSD FRML MDRD: 29 ML/MIN/1.73 (ref 42–98)
GLOBULIN UR ELPH-MCNC: 2.8 GM/DL (ref 2.3–3.5)
GLUCOSE BLD-MCNC: 115 MG/DL (ref 70–99)
HBA1C MFR BLD: 5.4 % (ref 4.8–5.6)
HCT VFR BLD AUTO: 36.9 % (ref 37.5–51)
HGB BLD-MCNC: 12 G/DL (ref 13–17.7)
IRON 24H UR-MRATE: 62 MCG/DL (ref 59–158)
IRON SATN MFR SERPL: 19 % (ref 20–50)
LYMPHOCYTES # BLD MANUAL: 2.34 10*3/MM3 (ref 0.7–3.1)
LYMPHOCYTES NFR BLD MANUAL: 30 % (ref 19.6–45.3)
LYMPHOCYTES NFR BLD MANUAL: 6 % (ref 5–12)
MCH RBC QN AUTO: 31.8 PG (ref 26.6–33)
MCHC RBC AUTO-ENTMCNC: 32.5 G/DL (ref 31.5–35.7)
MCV RBC AUTO: 97.9 FL (ref 79–97)
MONOCYTES # BLD AUTO: 0.47 10*3/MM3 (ref 0.1–0.9)
NEUTROPHILS # BLD AUTO: 4.6 10*3/MM3 (ref 1.7–7)
NEUTROPHILS NFR BLD MANUAL: 59 % (ref 42.7–76)
PLATELET # BLD AUTO: 214 10*3/MM3 (ref 140–450)
PMV BLD AUTO: 10.6 FL (ref 6–12)
POTASSIUM BLD-SCNC: 4.7 MMOL/L (ref 3.4–5)
PROT SERPL-MCNC: 7 G/DL (ref 6.3–8.6)
RBC # BLD AUTO: 3.77 10*6/MM3 (ref 4.14–5.8)
SMALL PLATELETS BLD QL SMEAR: ADEQUATE
SODIUM BLD-SCNC: 144 MMOL/L (ref 137–145)
TIBC SERPL-MCNC: 322 MCG/DL (ref 298–536)
TRANSFERRIN SERPL-MCNC: 216 MG/DL (ref 200–360)
TRIGL SERPL-MCNC: 382 MG/DL
TSH SERPL DL<=0.05 MIU/L-ACNC: 2.5 MIU/ML (ref 0.27–4.2)
VIT B12 BLD-MCNC: 666 PG/ML (ref 211–946)
WBC MORPH BLD: NORMAL
WBC NRBC COR # BLD: 7.8 10*3/MM3 (ref 3.4–10.8)

## 2019-06-19 PROCEDURE — 82607 VITAMIN B-12: CPT | Performed by: INTERNAL MEDICINE

## 2019-06-19 PROCEDURE — 84478 ASSAY OF TRIGLYCERIDES: CPT | Performed by: INTERNAL MEDICINE

## 2019-06-19 PROCEDURE — 85025 COMPLETE CBC W/AUTO DIFF WBC: CPT | Performed by: INTERNAL MEDICINE

## 2019-06-19 PROCEDURE — 82728 ASSAY OF FERRITIN: CPT | Performed by: INTERNAL MEDICINE

## 2019-06-19 PROCEDURE — 84466 ASSAY OF TRANSFERRIN: CPT | Performed by: INTERNAL MEDICINE

## 2019-06-19 PROCEDURE — 83540 ASSAY OF IRON: CPT | Performed by: INTERNAL MEDICINE

## 2019-06-19 PROCEDURE — 83721 ASSAY OF BLOOD LIPOPROTEIN: CPT | Performed by: INTERNAL MEDICINE

## 2019-06-19 PROCEDURE — 80053 COMPREHEN METABOLIC PANEL: CPT | Performed by: INTERNAL MEDICINE

## 2019-06-19 PROCEDURE — 83036 HEMOGLOBIN GLYCOSYLATED A1C: CPT | Performed by: INTERNAL MEDICINE

## 2019-06-19 PROCEDURE — 84443 ASSAY THYROID STIM HORMONE: CPT | Performed by: INTERNAL MEDICINE

## 2019-06-19 PROCEDURE — 36415 COLL VENOUS BLD VENIPUNCTURE: CPT | Performed by: INTERNAL MEDICINE

## 2019-06-24 ENCOUNTER — OFFICE VISIT (OUTPATIENT)
Dept: FAMILY MEDICINE CLINIC | Facility: CLINIC | Age: 84
End: 2019-06-24

## 2019-06-24 VITALS
BODY MASS INDEX: 24.76 KG/M2 | WEIGHT: 167.2 LBS | SYSTOLIC BLOOD PRESSURE: 130 MMHG | HEART RATE: 64 BPM | DIASTOLIC BLOOD PRESSURE: 80 MMHG | HEIGHT: 69 IN | TEMPERATURE: 98 F

## 2019-06-24 DIAGNOSIS — N18.30 TYPE 2 DIABETES MELLITUS WITH STAGE 3 CHRONIC KIDNEY DISEASE, WITHOUT LONG-TERM CURRENT USE OF INSULIN (HCC): Chronic | ICD-10-CM

## 2019-06-24 DIAGNOSIS — I73.9 PERIPHERAL VASCULAR DISEASE (HCC): Chronic | ICD-10-CM

## 2019-06-24 DIAGNOSIS — E78.2 MIXED HYPERLIPIDEMIA: Chronic | ICD-10-CM

## 2019-06-24 DIAGNOSIS — I25.10 CORONARY ARTERIOSCLEROSIS: Chronic | ICD-10-CM

## 2019-06-24 DIAGNOSIS — C61 MALIGNANT TUMOR OF PROSTATE (HCC): Chronic | ICD-10-CM

## 2019-06-24 DIAGNOSIS — I73.9 CLAUDICATION (HCC): Chronic | ICD-10-CM

## 2019-06-24 DIAGNOSIS — N18.30 CHRONIC KIDNEY DISEASE, STAGE 3 (HCC): Chronic | ICD-10-CM

## 2019-06-24 DIAGNOSIS — K21.9 GASTROESOPHAGEAL REFLUX DISEASE WITHOUT ESOPHAGITIS: Chronic | ICD-10-CM

## 2019-06-24 DIAGNOSIS — I10 ESSENTIAL HYPERTENSION: Primary | Chronic | ICD-10-CM

## 2019-06-24 DIAGNOSIS — D50.8 OTHER IRON DEFICIENCY ANEMIA: Chronic | ICD-10-CM

## 2019-06-24 DIAGNOSIS — E11.22 TYPE 2 DIABETES MELLITUS WITH STAGE 3 CHRONIC KIDNEY DISEASE, WITHOUT LONG-TERM CURRENT USE OF INSULIN (HCC): Chronic | ICD-10-CM

## 2019-06-24 DIAGNOSIS — E53.8 B12 DEFICIENCY: ICD-10-CM

## 2019-06-24 PROCEDURE — 96372 THER/PROPH/DIAG INJ SC/IM: CPT | Performed by: INTERNAL MEDICINE

## 2019-06-24 PROCEDURE — 99214 OFFICE O/P EST MOD 30 MIN: CPT | Performed by: INTERNAL MEDICINE

## 2019-06-24 RX ADMIN — CYANOCOBALAMIN 1000 MCG: 1000 INJECTION, SOLUTION INTRAMUSCULAR; SUBCUTANEOUS at 14:32

## 2019-06-24 NOTE — PROGRESS NOTES
Subjective        History of Present Illness     Sy Florez (Grant) is a 89 y.o. male who presents for 4-month follow up of CKD, CAD, PVD, PAD, hypertension, high cholesterol, iron deficiency anemia.  CBC reveals gradually improving hemoglobin 12 improved from 11.1.  Vitamin B-12 is also improved at 666, increased from 472.  He continues to follow with Dr. Mccord for iron deficiency anemia, although, hemoglobin and B-12 are gradually improving.  GERD symptoms adequately managed with omeprazole.  Diabetes adequately managed with diet control.  He reports he is able to sleep well at night. Denies any recent falls.     He sustained a fall in January resulting in right elbow/olecranon fracture and hairline fracture of anteromedial aspect of right hip with residual hip pain. Walking any distance causes discomfort.  We discussed a power chair to help with ambulation.  He declines.     He struggles with reports extensive vascular disease. Denies chest pain.  LDL above goal at 166 with Crestor once weekly.  He is unable to tolerate it more frequently.    He struggles with chronic venous insufficiency, for which we reviewed compliance with support stockings, sodium restriction, maintaining goal body weight, and elevating the lower extremities when not ambulating.          Weight is down 2 pounds in the past six months.  Blood pressure is at goal.      Renal function declined with creatinine 2.17, increased from 2.07, although, remaining in his baseline range.    The patient's relevant past medical, surgical, and social history was reviewed in Epic.   Lab results are reviewed with the patient today.  CBC reveals hemoglobin at 12 increased from 11.1.  Fasting glucose 115.   A1c is 5.4.  Iron is improved.  B-12 is at goal.  Last B-12 injection was given 02/2019.      Review of Systems   Constitutional: Negative for chills, fatigue and fever.   HENT: Negative for congestion, ear pain, postnasal drip, sinus pressure and  "sore throat.    Respiratory: Negative for cough, shortness of breath and wheezing.    Cardiovascular: Positive for leg swelling. Negative for chest pain and palpitations.   Gastrointestinal: Negative for abdominal pain, blood in stool, constipation, diarrhea, nausea and vomiting.   Endocrine: Negative for cold intolerance, heat intolerance, polydipsia and polyuria.   Genitourinary: Negative for dysuria, frequency, hematuria and urgency.   Musculoskeletal: Positive for arthralgias and gait problem.   Skin: Negative for rash.   Neurological: Negative for syncope and weakness.        Objective     Vitals:    06/24/19 1357   BP: 130/80   Pulse: 64   Temp: 98 °F (36.7 °C)   TempSrc: Oral   Weight: 75.8 kg (167 lb 3.2 oz)   Height: 175.3 cm (69\")     Physical Exam   Constitutional: He is oriented to person, place, and time. He appears well-developed and well-nourished. No distress.   HENT:   Head: Normocephalic and atraumatic.   Nose: Right sinus exhibits no maxillary sinus tenderness and no frontal sinus tenderness. Left sinus exhibits no maxillary sinus tenderness and no frontal sinus tenderness.   Mouth/Throat: Uvula is midline, oropharynx is clear and moist and mucous membranes are normal. No oral lesions. No tonsillar exudate.   Eyes: Conjunctivae and EOM are normal. Pupils are equal, round, and reactive to light.   Neck: Trachea normal. Neck supple. No JVD present. Carotid bruit is not present. No tracheal deviation present. No thyroid mass and no thyromegaly present.   Cardiovascular: Normal rate, regular rhythm, normal heart sounds and intact distal pulses.  No extrasystoles are present. PMI is not displaced.   No murmur heard.  Pulmonary/Chest: Effort normal and breath sounds normal. No accessory muscle usage. No respiratory distress. He has no decreased breath sounds. He has no wheezes. He has no rhonchi. He has no rales.   Abdominal: Soft. Bowel sounds are normal. He exhibits no distension. There is no " hepatosplenomegaly. There is no tenderness.     Vascular Status -  His right foot exhibits abnormal foot vasculature  (Diminished pulses bilateral ankles. ) and abnormal foot edema (Trace edema bilateral lower extremities with evidence of chronic venous insufficiency. ). His left foot exhibits abnormal foot vasculature  and abnormal foot edema.  Lymphadenopathy:     He has no cervical adenopathy.   Neurological: He is alert and oriented to person, place, and time. No cranial nerve deficit. Coordination normal.   Skin: Skin is warm, dry and intact. No rash noted. No cyanosis. Nails show no clubbing.   Sun-damaged skin.     Psychiatric: He has a normal mood and affect. His speech is normal and behavior is normal. Judgment and thought content normal.   Vitals reviewed.      Assessment/Plan      Continue to avoid NSAIDs and other nephrotoxic drugs due to CKD.     He is given a vitamin B-12 injection today.  Hemoglobin and vitain B-12 continue to improve.  We will continue to monitor.         Continue low dose prednisone 5 mg daily.      Continue Prilosec for GERD/gastritis.     Continue Crestor once weekly taking longer breaks when needed due to myalgias.  Continue the 81 mg daily aspirin.      Reviewed management of chronic venous insufficiency including compression stockings, maintaining goal weight, sodium restriction and elevating the lower extremities when not ambulating.   We discussed a power chair to help ease ambulation.  He declines.            Continue other medications and vitamin and mineral supplements to treat additional medical problems which we addressed today.  Return in four months for follow up with fasting labs one week prior.      Scribed for Dr. Rodriguez by Johanna Reyes [facility].     Diagnoses and all orders for this visit:    Essential hypertension  -     CBC Auto Differential; Future  -     Comprehensive Metabolic Panel; Future    Coronary arteriosclerosis    Mixed hyperlipidemia  -     LDL  Cholesterol, Direct; Future    Peripheral vascular disease (CMS/[facility])    Type 2 diabetes mellitus with stage 3 chronic kidney disease, without long-term current use of insulin (CMS/HCC)  -     CBC Auto Differential; Future  -     Comprehensive Metabolic Panel; Future  -     Hemoglobin A1c; Future    Gastroesophageal reflux disease without esophagitis    B12 deficiency  -     Vitamin B12; Future    Claudication (CMS/HCC)    Chronic kidney disease, stage 3 (CMS/HCC)  -     CBC Auto Differential; Future  -     Comprehensive Metabolic Panel; Future    Malignant tumor of prostate (CMS/HCC)    Other iron deficiency anemia  -     Ferritin; Future  -     Iron Profile; Future        Lab on 06/19/2019   Component Date Value Ref Range Status   • WBC 06/19/2019 7.80  3.40 - 10.80 10*3/mm3 Final   • RBC 06/19/2019 3.77* 4.14 - 5.80 10*6/mm3 Final   • Hemoglobin 06/19/2019 12.0* 13.0 - 17.7 g/dL Final   • Hematocrit 06/19/2019 36.9* 37.5 - 51.0 % Final   • MCV 06/19/2019 97.9* 79.0 - 97.0 fL Final   • MCH 06/19/2019 31.8  26.6 - 33.0 pg Final   • MCHC 06/19/2019 32.5  31.5 - 35.7 g/dL Final   • RDW 06/19/2019 15.0  12.3 - 15.4 % Final   • RDW-SD 06/19/2019 52.0  37.0 - 54.0 fl Final   • MPV 06/19/2019 10.6  6.0 - 12.0 fL Final   • Platelets 06/19/2019 214  140 - 450 10*3/mm3 Final   • Glucose 06/19/2019 115* 70 - 99 mg/dL Final   • BUN 06/19/2019 49* 7 - 23 mg/dL Final   • Creatinine 06/19/2019 2.17* 0.70 - 1.30 mg/dL Final   • Sodium 06/19/2019 144  137 - 145 mmol/L Final   • Potassium 06/19/2019 4.7  3.4 - 5.0 mmol/L Final   • Chloride 06/19/2019 110  101 - 112 mmol/L Final   • CO2 06/19/2019 23.0  22.0 - 30.0 mmol/L Final   • Calcium 06/19/2019 9.8  8.4 - 10.2 mg/dL Final   • Total Protein 06/19/2019 7.0  6.3 - 8.6 g/dL Final   • Albumin 06/19/2019 4.20  3.50 - 5.00 g/dL Final   • ALT (SGPT) 06/19/2019 15  <=50 U/L Final   • AST (SGOT) 06/19/2019 19  17 - 59 U/L Final   • Alkaline Phosphatase 06/19/2019 202* 38 - 126 U/L Final    • Total Bilirubin 06/19/2019 0.2  0.2 - 1.3 mg/dL Final   • eGFR Non African Amer 06/19/2019 29* 42 - 98 mL/min/1.73 Final   • Globulin 06/19/2019 2.8  2.3 - 3.5 gm/dL Final   • A/G Ratio 06/19/2019 1.5  1.1 - 1.8 g/dL Final   • BUN/Creatinine Ratio 06/19/2019 22.6  7.0 - 25.0 Final   • Anion Gap 06/19/2019 11.0  5.0 - 15.0 mmol/L Final   • Hemoglobin A1C 06/19/2019 5.40  4.80 - 5.60 % Final   • LDL Cholesterol  06/19/2019 166* 0 - 100 mg/dL Final   • Triglycerides 06/19/2019 382* <=150 mg/dL Final   • TSH 06/19/2019 2.500  0.270 - 4.200 mIU/mL Final   • Ferritin 06/19/2019 446.00* 30.00 - 400.00 ng/mL Final   • Iron 06/19/2019 62  59 - 158 mcg/dL Final   • Iron Saturation 06/19/2019 19* 20 - 50 % Final   • Transferrin 06/19/2019 216  200 - 360 mg/dL Final   • TIBC 06/19/2019 322  298 - 536 mcg/dL Final   • Vitamin B-12 06/19/2019 666  211 - 946 pg/mL Final   • Neutrophil % 06/19/2019 59.0  42.7 - 76.0 % Final   • Lymphocyte % 06/19/2019 30.0  19.6 - 45.3 % Final   • Monocyte % 06/19/2019 6.0  5.0 - 12.0 % Final   • Eosinophil % 06/19/2019 5.0  0.3 - 6.2 % Final   • Neutrophils Absolute 06/19/2019 4.60  1.70 - 7.00 10*3/mm3 Final   • Lymphocytes Absolute 06/19/2019 2.34  0.70 - 3.10 10*3/mm3 Final   • Monocytes Absolute 06/19/2019 0.47  0.10 - 0.90 10*3/mm3 Final   • Eosinophils Absolute 06/19/2019 0.39  0.00 - 0.40 10*3/mm3 Final   • Anisocytosis 06/19/2019 Slight/1+  None Seen Final   • WBC Morphology 06/19/2019 Normal  Normal Final   • Platelet Estimate 06/19/2019 Adequate  Normal Final   ]

## 2019-07-17 ENCOUNTER — APPOINTMENT (OUTPATIENT)
Dept: ONCOLOGY | Facility: HOSPITAL | Age: 84
End: 2019-07-17

## 2019-07-17 ENCOUNTER — OFFICE VISIT (OUTPATIENT)
Dept: ONCOLOGY | Facility: CLINIC | Age: 84
End: 2019-07-17

## 2019-07-17 VITALS
HEIGHT: 69 IN | SYSTOLIC BLOOD PRESSURE: 145 MMHG | WEIGHT: 165.4 LBS | BODY MASS INDEX: 24.5 KG/M2 | HEART RATE: 63 BPM | RESPIRATION RATE: 18 BRPM | TEMPERATURE: 98.7 F | DIASTOLIC BLOOD PRESSURE: 73 MMHG

## 2019-07-17 DIAGNOSIS — D50.8 OTHER IRON DEFICIENCY ANEMIA: Primary | Chronic | ICD-10-CM

## 2019-07-17 PROCEDURE — 99213 OFFICE O/P EST LOW 20 MIN: CPT | Performed by: NURSE PRACTITIONER

## 2019-07-17 PROCEDURE — G0463 HOSPITAL OUTPT CLINIC VISIT: HCPCS | Performed by: NURSE PRACTITIONER

## 2019-07-18 NOTE — PROGRESS NOTES
DATE OF VISIT: 7/17/2019    REASON FOR VISIT:  Follow-up for iron deficiency anemia     HISTORY OF PRESENT ILLNESS:  89-year-old male with a past medical history significant for history of peripheral vascular disease status post femoropopliteal bypass, prostate cancer status post seed in 2001, history of coronary artery disease status post CABG was initially seen in consultation on December 20, 2017 for evaluation of iron deficiency anemia and not able to tolerate borderline.  Subsequently patient received 2 dose of intravenous Feraheme last of which was on January 11, 2018. His iron level improved. Recently iron levels dropped again and he was given 2 additional doses of Feraheme. His stool was checked and was negative; referral to GI was placed but patient refused to go.    Patient states he follows with cardiology in [place] and continues to follow with Dr. Rodriguez for primary care.  Denies any bleeding. States he does have an abrasion on his left calf area from an incident with a dog; states he has been cleaning area and keeping it dry.     PAST MEDICAL HISTORY:    Past Medical History:   Diagnosis Date   • Actinic keratosis    • Acute prostatitis     h/o 2014    • Anemia      New problem noted with preoperative labs, 10/2015.. Anemia workup initiated, 10/2015      • Arthritis      Possible inflammatory arthritis bilateral knees   • Bursitis of hip     right greater trochanteric bursitis, spring 2014   • Cellulitis of lower limb     mild bilateral lower extremities   • Chronic kidney disease, stage 3 (CMS/HCC)     - cr =1.8-2.0, gradually worsening   • Claudication (CMS/HCC)     dr. you s/p numerous revascularization. medical management   • Coronary arteriosclerosis     dr. perez   • Degenerative joint disease involving multiple joints    • Disorder of lumbar spine     Lumbar laminectomy at [facility], 11/4/2015      • Dyslipidemia     Resumed statin therapy 1/2015. Lipitor   • Edema of lower  extremity      Etiology undetermined. Chronic venous insufficiency is playing a role   • Essential hypertension    • Eustachian tube disorder    • Gastroesophageal reflux disease    • Hyperlipidemia     Resumed extended interval Lipitor, 7/2015   • Idiopathic peripheral neuropathy     Gradually increasing Neurontin. Referred to Dr. Vergara, neurologist, 4/2015      • IFG (impaired fasting glucose)    • Iron deficiency anemia      New problem noted with preoperative labs, 10/2015.. Anemia workup initiated, 10/2015      • Leg cramp     nocturnal leg cramps   • Malaise and fatigue    • Malignant tumor of prostate (CMS/HCC)     6/2001 s/p brachytherapy   • Peripheral vascular disease (CMS/HCC)     PAD/claudication. Dr. Brennan      • Peripheral venous insufficiency     Chronic venous insufficiency bilateral lower extremities, left worse than right      • Pneumonia     H/O   • Pruritic rash     Autoimmune. Responds to methotrexate. Dr. Oliveros, dermatology. The patient stopped the methotrexate and declines resumption. Started low-dose prednisone, 4/2016      • Type 2 diabetes mellitus (CMS/HCC)     Newly diagnosed, 8/2015. Started Amaryl, 8/2015. Metformin and pioglitizone contraindicated   • Type 2 diabetes mellitus with chronic kidney disease, without long-term current use of insulin (CMS/HCC)     Newly diagnosed, 8/2015. Started Amaryl, 8/2015. Metformin and pioglitizone contraindicated   • UTI (urinary tract infection)     H/O,site not specified-possible       SOCIAL HISTORY:    Social History     Tobacco Use   • Smoking status: Former Smoker     Types: Cigarettes   • Smokeless tobacco: Never Used   • Tobacco comment: 40 years ago   Substance Use Topics   • Alcohol use: No   • Drug use: No       Surgical History :  Past Surgical History:   Procedure Laterality Date   • CORONARY ARTERY BYPASS GRAFT      vein,two  09/2006   • ENDOSCOPY AND COLONOSCOPY      7/2011 declines   • OTHER SURGICAL HISTORY      femoral-popliteal  "bypass graft - right leg 1997/left leg 2007   • OTHER SURGICAL HISTORY      laser surgery of prostate, - radiation implant;  Last Performed: 06/2001   • OTHER SURGICAL HISTORY      low back disk surgery , - Lumbar laminectomy with L4/L5 mechanical fusion;  Last Performed: 11/2015   • OTHER SURGICAL HISTORY      remove impacted cerumen 4/06/16       ALLERGIES:    Allergies   Allergen Reactions   • Bacitracin Rash   • Formaldehyde Rash   • Framycetin Rash     All Fragrances   • Neomycin Rash   • Nickel Rash       REVIEW OF SYSTEMS:      CONSTITUTIONAL:  No fever, chills, or night sweats.     HEENT:  No epistaxis, mouth sores, or difficulty swallowing.    RESPIRATORY:  No new shortness of breath or cough at present.    CARDIOVASCULAR:  No chest pain or palpitations.    GASTROINTESTINAL:  No abdominal pain, nausea, vomiting, or blood in the stool.    GENITOURINARY:  No dysuria or hematuria.    MUSCULOSKELETAL:  No any new back pain or arthralgias.     NEUROLOGICAL:  No tingling or numbness. No new headache or dizziness.     LYMPHATICS:  Denies any abnormal swollen and anywhere in the body.    SKIN:  Denies any new skin rash.    PHYSICAL EXAMINATION:      VITAL SIGNS:  /73   Pulse 63   Temp 98.7 °F (37.1 °C) (Temporal)   Resp 18   Ht 175.3 cm (69.02\")   Wt 75 kg (165 lb 6.4 oz)   BMI 24.41 kg/m²     GENERAL:  Not in any distress.    HEENT:  Normocephalic, Atraumatic.Mild Conjunctival pallor. No icterus. No Facial Asymmetry noted.    NECK:  No adenopathy. No JVD.    RESPIRATORY:  Fair air entry bilateral. No rhonchi or wheezing.    CARDIOVASCULAR:  S1, S2. Regular rate and rhythm. No murmur or gallop appreciated.    ABDOMEN:  Soft, obese, nontender. Bowel sounds present in all four quadrants.  No organomegaly appreciated.    EXTREMITIES:  No edema.No Calf Tenderness.    NEUROLOGIC:  Alert, awake and oriented ×3.      SKIN : abrasion on left lower leg, no erythema noted, skin is warm and dry.    DIAGNOSTIC " DATA:    Glucose   Date Value Ref Range Status   06/19/2019 115 (H) 70 - 99 mg/dL Final     Sodium   Date Value Ref Range Status   06/19/2019 144 137 - 145 mmol/L Final     Potassium   Date Value Ref Range Status   06/19/2019 4.7 3.4 - 5.0 mmol/L Final     CO2   Date Value Ref Range Status   06/19/2019 23.0 22.0 - 30.0 mmol/L Final     Chloride   Date Value Ref Range Status   06/19/2019 110 101 - 112 mmol/L Final     Anion Gap   Date Value Ref Range Status   06/19/2019 11.0 5.0 - 15.0 mmol/L Final     Creatinine   Date Value Ref Range Status   06/19/2019 2.17 (H) 0.70 - 1.30 mg/dL Final     BUN   Date Value Ref Range Status   06/19/2019 49 (H) 7 - 23 mg/dL Final     BUN/Creatinine Ratio   Date Value Ref Range Status   06/19/2019 22.6 7.0 - 25.0 Final     Calcium   Date Value Ref Range Status   06/19/2019 9.8 8.4 - 10.2 mg/dL Final     eGFR Non  Amer   Date Value Ref Range Status   06/19/2019 29 (L) 42 - 98 mL/min/1.73 Final     Alkaline Phosphatase   Date Value Ref Range Status   06/19/2019 202 (H) 38 - 126 U/L Final     Total Protein   Date Value Ref Range Status   06/19/2019 7.0 6.3 - 8.6 g/dL Final     ALT (SGPT)   Date Value Ref Range Status   06/19/2019 15 <=50 U/L Final     AST (SGOT)   Date Value Ref Range Status   06/19/2019 19 17 - 59 U/L Final     Total Bilirubin   Date Value Ref Range Status   06/19/2019 0.2 0.2 - 1.3 mg/dL Final     Albumin   Date Value Ref Range Status   06/19/2019 4.20 3.50 - 5.00 g/dL Final     Globulin   Date Value Ref Range Status   06/19/2019 2.8 2.3 - 3.5 gm/dL Final     Lab Results   Component Value Date    WBC 7.80 06/19/2019    HGB 12.0 (L) 06/19/2019    HCT 36.9 (L) 06/19/2019    MCV 97.9 (H) 06/19/2019     06/19/2019     Lab Results   Component Value Date    NEUTROABS 4.60 06/19/2019    IRON 62 06/19/2019    TIBC 322 06/19/2019    LABIRON 19 (L) 06/19/2019    FERRITIN 446.00 (H) 06/19/2019    [id number] 666 06/19/2019    FOLATE 18.10 02/11/2019     No results  found for: , LABCA2, AFPTM, HCGQUANT, , CHROMGRNA, [id number], CEA, REFLABREPO]        ASSESSMENT AND PLAN:      1. Iron deficiency anemia secondary to possible GI bleed and component of CKD stage III;  He received IV iron back in January 2018 due to his inability to tolerate PO iron; his iron levels improved; recently levels dropped again and he was given 2 additional doses of iron; stool occult was negative and GI referral was placed but pt refused to go. His Hgb and iron studies improved.  Hgb today is stable at 12; iron levels are stable as well. Will repeat labs again in 5 mos.     2.  Chronic kidney disease stage III; pt was following with nephrology at [facility]; states he does not follow with anyone at this time; GFR today is 29; will defer to his PCP.     3.  Prostate cancer status post seed implant. Being followed by different provider, not managed by our clinic.     4.  Peripheral vascular disease status post femoropopliteal bypass. Follows with cardiologist in [place].      Patient's Body mass index is 24.41 kg/m². BMI is within normal parameters. No follow-up required..    This document has been signed by JUAN M Hernandez on July 18, 2019 9:55 AM

## 2019-08-05 RX ORDER — ROSUVASTATIN CALCIUM 10 MG/1
10 TABLET, COATED ORAL DAILY
Qty: 30 TABLET | Refills: 11 | Status: SHIPPED | OUTPATIENT
Start: 2019-08-05 | End: 2021-01-01 | Stop reason: SDUPTHER

## 2019-08-09 RX ORDER — GABAPENTIN 600 MG/1
TABLET ORAL
Qty: 60 TABLET | Refills: 5 | Status: CANCELLED | OUTPATIENT
Start: 2019-08-09

## 2019-08-09 RX ORDER — GABAPENTIN 600 MG/1
TABLET ORAL
Qty: 60 TABLET | Refills: 5 | Status: SHIPPED | OUTPATIENT
Start: 2019-08-09 | End: 2020-04-08 | Stop reason: SDUPTHER

## 2019-08-12 RX ORDER — AMLODIPINE BESYLATE 5 MG/1
TABLET ORAL
Qty: 30 TABLET | Refills: 11 | Status: SHIPPED | OUTPATIENT
Start: 2019-08-12 | End: 2019-10-28

## 2019-10-22 ENCOUNTER — LAB (OUTPATIENT)
Dept: LAB | Facility: OTHER | Age: 84
End: 2019-10-22

## 2019-10-22 DIAGNOSIS — E78.2 MIXED HYPERLIPIDEMIA: Chronic | ICD-10-CM

## 2019-10-22 DIAGNOSIS — N18.30 CHRONIC KIDNEY DISEASE, STAGE 3 (HCC): Chronic | ICD-10-CM

## 2019-10-22 DIAGNOSIS — D50.8 OTHER IRON DEFICIENCY ANEMIA: Chronic | ICD-10-CM

## 2019-10-22 DIAGNOSIS — E11.22 TYPE 2 DIABETES MELLITUS WITH STAGE 3 CHRONIC KIDNEY DISEASE, WITHOUT LONG-TERM CURRENT USE OF INSULIN (HCC): Chronic | ICD-10-CM

## 2019-10-22 DIAGNOSIS — E53.8 B12 DEFICIENCY: ICD-10-CM

## 2019-10-22 DIAGNOSIS — N18.30 TYPE 2 DIABETES MELLITUS WITH STAGE 3 CHRONIC KIDNEY DISEASE, WITHOUT LONG-TERM CURRENT USE OF INSULIN (HCC): Chronic | ICD-10-CM

## 2019-10-22 DIAGNOSIS — I10 ESSENTIAL HYPERTENSION: Chronic | ICD-10-CM

## 2019-10-22 LAB
ALBUMIN SERPL-MCNC: 4.3 G/DL (ref 3.5–5)
ALBUMIN/GLOB SERPL: 1.5 G/DL (ref 1.1–1.8)
ALP SERPL-CCNC: 132 U/L (ref 38–126)
ALT SERPL W P-5'-P-CCNC: 14 U/L
ANION GAP SERPL CALCULATED.3IONS-SCNC: 12 MMOL/L (ref 5–15)
ARTICHOKE IGE QN: 135 MG/DL (ref 0–100)
AST SERPL-CCNC: 21 U/L (ref 17–59)
BASOPHILS # BLD AUTO: 0.06 10*3/MM3 (ref 0–0.2)
BASOPHILS NFR BLD AUTO: 0.7 % (ref 0–1.5)
BILIRUB SERPL-MCNC: 0.4 MG/DL (ref 0.2–1.3)
BUN BLD-MCNC: 41 MG/DL (ref 7–23)
BUN/CREAT SERPL: 18.1 (ref 7–25)
CALCIUM SPEC-SCNC: 9.6 MG/DL (ref 8.4–10.2)
CHLORIDE SERPL-SCNC: 106 MMOL/L (ref 101–112)
CO2 SERPL-SCNC: 26 MMOL/L (ref 22–30)
CREAT BLD-MCNC: 2.27 MG/DL (ref 0.7–1.3)
DEPRECATED RDW RBC AUTO: 49.8 FL (ref 37–54)
EOSINOPHIL # BLD AUTO: 0.27 10*3/MM3 (ref 0–0.4)
EOSINOPHIL NFR BLD AUTO: 3.4 % (ref 0.3–6.2)
ERYTHROCYTE [DISTWIDTH] IN BLOOD BY AUTOMATED COUNT: 14.9 % (ref 12.3–15.4)
FERRITIN SERPL-MCNC: 195 NG/ML (ref 30–400)
GFR SERPL CREATININE-BSD FRML MDRD: 27 ML/MIN/1.73 (ref 42–98)
GLOBULIN UR ELPH-MCNC: 2.8 GM/DL (ref 2.3–3.5)
GLUCOSE BLD-MCNC: 114 MG/DL (ref 70–99)
HBA1C MFR BLD: 5.6 % (ref 4.8–5.6)
HCT VFR BLD AUTO: 35.9 % (ref 37.5–51)
HGB BLD-MCNC: 11.2 G/DL (ref 13–17.7)
IRON 24H UR-MRATE: 49 MCG/DL (ref 59–158)
IRON SATN MFR SERPL: 14 % (ref 20–50)
LYMPHOCYTES # BLD AUTO: 1.49 10*3/MM3 (ref 0.7–3.1)
LYMPHOCYTES NFR BLD AUTO: 18.5 % (ref 19.6–45.3)
MCH RBC QN AUTO: 29.8 PG (ref 26.6–33)
MCHC RBC AUTO-ENTMCNC: 31.2 G/DL (ref 31.5–35.7)
MCV RBC AUTO: 95.5 FL (ref 79–97)
MONOCYTES # BLD AUTO: 0.6 10*3/MM3 (ref 0.1–0.9)
MONOCYTES NFR BLD AUTO: 7.5 % (ref 5–12)
NEUTROPHILS # BLD AUTO: 5.63 10*3/MM3 (ref 1.7–7)
NEUTROPHILS NFR BLD AUTO: 69.9 % (ref 42.7–76)
PLATELET # BLD AUTO: 223 10*3/MM3 (ref 140–450)
PMV BLD AUTO: 10.5 FL (ref 6–12)
POTASSIUM BLD-SCNC: 4.7 MMOL/L (ref 3.4–5)
PROT SERPL-MCNC: 7.1 G/DL (ref 6.3–8.6)
RBC # BLD AUTO: 3.76 10*6/MM3 (ref 4.14–5.8)
SODIUM BLD-SCNC: 144 MMOL/L (ref 137–145)
TIBC SERPL-MCNC: 349 MCG/DL (ref 298–536)
TRANSFERRIN SERPL-MCNC: 234 MG/DL (ref 200–360)
VIT B12 BLD-MCNC: 697 PG/ML (ref 211–946)
WBC NRBC COR # BLD: 8.05 10*3/MM3 (ref 3.4–10.8)

## 2019-10-22 PROCEDURE — 84466 ASSAY OF TRANSFERRIN: CPT | Performed by: INTERNAL MEDICINE

## 2019-10-22 PROCEDURE — 83540 ASSAY OF IRON: CPT | Performed by: INTERNAL MEDICINE

## 2019-10-22 PROCEDURE — 83721 ASSAY OF BLOOD LIPOPROTEIN: CPT | Performed by: INTERNAL MEDICINE

## 2019-10-22 PROCEDURE — 36415 COLL VENOUS BLD VENIPUNCTURE: CPT | Performed by: INTERNAL MEDICINE

## 2019-10-22 PROCEDURE — 82607 VITAMIN B-12: CPT | Performed by: INTERNAL MEDICINE

## 2019-10-22 PROCEDURE — 80053 COMPREHEN METABOLIC PANEL: CPT | Performed by: INTERNAL MEDICINE

## 2019-10-22 PROCEDURE — 83036 HEMOGLOBIN GLYCOSYLATED A1C: CPT | Performed by: INTERNAL MEDICINE

## 2019-10-22 PROCEDURE — 85025 COMPLETE CBC W/AUTO DIFF WBC: CPT | Performed by: INTERNAL MEDICINE

## 2019-10-22 PROCEDURE — 82728 ASSAY OF FERRITIN: CPT | Performed by: INTERNAL MEDICINE

## 2019-10-28 ENCOUNTER — OFFICE VISIT (OUTPATIENT)
Dept: FAMILY MEDICINE CLINIC | Facility: CLINIC | Age: 84
End: 2019-10-28

## 2019-10-28 VITALS
SYSTOLIC BLOOD PRESSURE: 124 MMHG | HEART RATE: 64 BPM | OXYGEN SATURATION: 98 % | BODY MASS INDEX: 25.03 KG/M2 | WEIGHT: 169 LBS | HEIGHT: 69 IN | DIASTOLIC BLOOD PRESSURE: 68 MMHG | TEMPERATURE: 98.7 F

## 2019-10-28 DIAGNOSIS — K21.9 GASTROESOPHAGEAL REFLUX DISEASE WITHOUT ESOPHAGITIS: Chronic | ICD-10-CM

## 2019-10-28 DIAGNOSIS — N18.30 TYPE 2 DIABETES MELLITUS WITH STAGE 3 CHRONIC KIDNEY DISEASE, WITHOUT LONG-TERM CURRENT USE OF INSULIN (HCC): Chronic | ICD-10-CM

## 2019-10-28 DIAGNOSIS — E78.2 MIXED HYPERLIPIDEMIA: Chronic | ICD-10-CM

## 2019-10-28 DIAGNOSIS — I10 ESSENTIAL HYPERTENSION: Primary | Chronic | ICD-10-CM

## 2019-10-28 DIAGNOSIS — I73.9 PERIPHERAL VASCULAR DISEASE (HCC): Chronic | ICD-10-CM

## 2019-10-28 DIAGNOSIS — N18.30 CHRONIC KIDNEY DISEASE, STAGE 3 (HCC): Chronic | ICD-10-CM

## 2019-10-28 DIAGNOSIS — I87.2 PERIPHERAL VENOUS INSUFFICIENCY: Chronic | ICD-10-CM

## 2019-10-28 DIAGNOSIS — D50.8 OTHER IRON DEFICIENCY ANEMIA: Chronic | ICD-10-CM

## 2019-10-28 DIAGNOSIS — E11.22 TYPE 2 DIABETES MELLITUS WITH STAGE 3 CHRONIC KIDNEY DISEASE, WITHOUT LONG-TERM CURRENT USE OF INSULIN (HCC): Chronic | ICD-10-CM

## 2019-10-28 DIAGNOSIS — E53.8 B12 DEFICIENCY: ICD-10-CM

## 2019-10-28 PROCEDURE — 99214 OFFICE O/P EST MOD 30 MIN: CPT | Performed by: INTERNAL MEDICINE

## 2019-10-28 RX ORDER — INFLUENZA A VIRUS A/MICHIGAN/45/2015 X-275 (H1N1) ANTIGEN (FORMALDEHYDE INACTIVATED), INFLUENZA A VIRUS A/SINGAPORE/INFIMH-16-0019/2016 IVR-186 (H3N2) ANTIGEN (FORMALDEHYDE INACTIVATED), AND INFLUENZA B VIRUS B/MARYLAND/15/2016 BX-69A (A B/COLORADO/6/2017-LIKE VIRUS) ANTIGEN (FORMALDEHYDE INACTIVATED) 60; 60; 60 UG/.5ML; UG/.5ML; UG/.5ML
INJECTION, SUSPENSION INTRAMUSCULAR
Refills: 0 | COMMUNITY
Start: 2019-10-26 | End: 2019-12-02

## 2019-10-28 NOTE — PROGRESS NOTES
Subjective        History of Present Illness     Sy Florez (QUITA) is a 89 y.o. male who presents for 4-month follow up of stage 3 CKD, CAD, PVD, PAD, hypertension, high cholesterol, iron deficiency anemia.  He struggles with reports extensive peripheral vascular disease, status post femoropopliteal bypass.  LDL improved at 135 down from 166 with Crestor once weekly.   He sustained a fall in January 2018 resulting in right elbow/olecranon fracture and hairline fracture of anteromedial aspect of right hip with residual hip pain. GERD symptoms adequately managed with omeprazole.  Diabetes adequately managed with diet control.     He reports rare episodes of chest pain, only once to twice over the past four months.  I offered cardiology referral, although, he doesn't feel symptoms warrant referral at this time.    He has a suspicious skin lesion on the left upper extremity.  He sees dermatology routinely and has a dermatology appointment scheduled for December 5th with [facility].  We will see if we can get this appointment expedited.       Oncology addressed iron deficiency anemia secondary to possible GI bleed and component of CKD stage III.  He received IV iron on two occasions due to inability to tolerate oral iron.   Iron as well as hemoglobin are drifting down.  He has noticed increased shortness of breath.   His appointment with oncology/hematology is not scheduled until  December 18th, for which we will see if we can get his appointment moved up.     He has already had influenza vaccine this fall.      Weight is up 2 pounds in the past four months.  Blood pressure at goal.     The patient's relevant past medical, surgical, and social history was reviewed in Epic.   Lab results are reviewed with the patient today. Fasting glucose 114. A1c is 5.6.  Vitamin B-12 normal range at 697.  Iron as well as hemoglobin are drifting down as noted above.  LDL  135 with once weekly Crestor.     Review of  "Systems   Constitutional: Negative for chills, fatigue and fever.   HENT: Negative for congestion, ear pain, postnasal drip, sinus pressure and sore throat.    Respiratory: Negative for cough, shortness of breath and wheezing.    Cardiovascular: Negative for chest pain, palpitations and leg swelling.   Gastrointestinal: Negative for abdominal pain, blood in stool, constipation, diarrhea, nausea and vomiting.   Endocrine: Negative for cold intolerance, heat intolerance, polydipsia and polyuria.   Genitourinary: Negative for dysuria, frequency, hematuria and urgency.   Skin: Negative for rash.   Neurological: Negative for syncope and weakness.        Objective      Visit Vitals  /68   Pulse 64   Temp 98.7 °F (37.1 °C) (Oral)   Ht 175.3 cm (69\")   Wt 76.7 kg (169 lb)   SpO2 98%   BMI 24.96 kg/m²         Physical Exam   Constitutional: He is oriented to person, place, and time. He appears well-developed and well-nourished. No distress.   HENT:   Head: Normocephalic and atraumatic.   Nose: Right sinus exhibits no maxillary sinus tenderness and no frontal sinus tenderness. Left sinus exhibits no maxillary sinus tenderness and no frontal sinus tenderness.   Mouth/Throat: Uvula is midline, oropharynx is clear and moist and mucous membranes are normal. No oral lesions. No tonsillar exudate.   Eyes: Conjunctivae and EOM are normal. Pupils are equal, round, and reactive to light.   Neck: Trachea normal. Neck supple. No JVD present. Carotid bruit is not present. No tracheal deviation present. No thyroid mass and no thyromegaly present.   Cardiovascular: Normal rate, regular rhythm, normal heart sounds and intact distal pulses.  No extrasystoles are present. PMI is not displaced.   No murmur heard.  Pulmonary/Chest: Effort normal and breath sounds normal. No accessory muscle usage. No respiratory distress. He has no decreased breath sounds. He has no wheezes. He has no rhonchi. He has no rales.   Chronic lung sounds.  "   Abdominal: Soft. Bowel sounds are normal. He exhibits no distension. There is no hepatosplenomegaly. There is no tenderness.   Ventral hernia just above xyphoid process.       Vascular Status -  His right foot exhibits abnormal foot vasculature  and abnormal foot edema (Trace edema bilateral lower extremities with chronic venous insufficiency. ). His left foot exhibits abnormal foot vasculature  and abnormal foot edema.  Lymphadenopathy:     He has no cervical adenopathy.   Neurological: He is alert and oriented to person, place, and time. No cranial nerve deficit. Coordination normal.   Skin: Skin is warm, dry and intact. No rash noted. No cyanosis. Nails show no clubbing.   Suspicious skin lesion on the left upper extremity.    Psychiatric: He has a normal mood and affect. His speech is normal and behavior is normal. Judgment and thought content normal.   Vitals reviewed.      Assessment/Plan      We will contact [facility] to see if patient can be seen sooner than his appointment on 12/05/2019 to evaluate the suspicious skin lesion on the right upper extremity.     We will also see if we can get his appointment with hematology/dermatology scheduled for 12/18/2019 moved up to address the iron deficiency anemia, which responded well to iron infusions last winter.    Continue the current vascular risk factor modifications, including Crestor and daily aspirin.  He can only tolerate Crestor once weekly due to myalgia.    Continue current diabetic management and monitoring.  He is diet controlled.    Continue Prilosec for GERD.         Continue to avoid NSAIDs and other nephrotoxic drugs due to CKD.     Return in four months for follow up with fasting labs one week prior.     Scribed for Dr. Rodriguez by Johanna Reyes [facility].     Diagnoses and all orders for this visit:    Essential hypertension  -     CBC Auto Differential; Future  -     Comprehensive Metabolic Panel; Future    Mixed hyperlipidemia  -      Lipid Panel; Future    Peripheral vascular disease (CMS/[facility])    Peripheral venous insufficiency    B12 deficiency  -     Vitamin B12; Future    Gastroesophageal reflux disease without esophagitis    Type 2 diabetes mellitus with stage 3 chronic kidney disease, without long-term current use of insulin (CMS/[facility])  -     Hemoglobin A1c; Future  -     Microalbumin / Creatinine Urine Ratio - Urine, Clean Catch; Future  -     TSH; Future    Chronic kidney disease, stage 3 (CMS/[facility])  -     Comprehensive Metabolic Panel; Future    Other iron deficiency anemia  -     Ferritin; Future  -     Iron Profile; Future    Other orders  -     FLUZONE HIGH-DOSE 0.5 ML suspension prefilled syringe injection; inject 0.5 milliliters intramuscularly        Lab on 10/22/2019   Component Date Value Ref Range Status   • WBC 10/22/2019 8.05  3.40 - 10.80 10*3/mm3 Final   • RBC 10/22/2019 3.76* 4.14 - 5.80 10*6/mm3 Final   • Hemoglobin 10/22/2019 11.2* 13.0 - 17.7 g/dL Final   • Hematocrit 10/22/2019 35.9* 37.5 - 51.0 % Final   • MCV 10/22/2019 95.5  79.0 - 97.0 fL Final   • MCH 10/22/2019 29.8  26.6 - 33.0 pg Final   • MCHC 10/22/2019 31.2* 31.5 - 35.7 g/dL Final   • RDW 10/22/2019 14.9  12.3 - 15.4 % Final   • RDW-SD 10/22/2019 49.8  37.0 - 54.0 fl Final   • MPV 10/22/2019 10.5  6.0 - 12.0 fL Final   • Platelets 10/22/2019 223  140 - 450 10*3/mm3 Final   • Neutrophil % 10/22/2019 69.9  42.7 - 76.0 % Final   • Lymphocyte % 10/22/2019 18.5* 19.6 - 45.3 % Final   • Monocyte % 10/22/2019 7.5  5.0 - 12.0 % Final   • Eosinophil % 10/22/2019 3.4  0.3 - 6.2 % Final   • Basophil % 10/22/2019 0.7  0.0 - 1.5 % Final   • Neutrophils, Absolute 10/22/2019 5.63  1.70 - 7.00 10*3/mm3 Final   • Lymphocytes, Absolute 10/22/2019 1.49  0.70 - 3.10 10*3/mm3 Final   • Monocytes, Absolute 10/22/2019 0.60  0.10 - 0.90 10*3/mm3 Final   • Eosinophils, Absolute 10/22/2019 0.27  0.00 - 0.40 10*3/mm3 Final   • Basophils, Absolute 10/22/2019 0.06  0.00 - 0.20 10*3/mm3  Final   • Glucose 10/22/2019 114* 70 - 99 mg/dL Final   • BUN 10/22/2019 41* 7 - 23 mg/dL Final   • Creatinine 10/22/2019 2.27* 0.70 - 1.30 mg/dL Final   • Sodium 10/22/2019 144  137 - 145 mmol/L Final   • Potassium 10/22/2019 4.7  3.4 - 5.0 mmol/L Final   • Chloride 10/22/2019 106  101 - 112 mmol/L Final   • CO2 10/22/2019 26.0  22.0 - 30.0 mmol/L Final   • Calcium 10/22/2019 9.6  8.4 - 10.2 mg/dL Final   • Total Protein 10/22/2019 7.1  6.3 - 8.6 g/dL Final   • Albumin 10/22/2019 4.30  3.50 - 5.00 g/dL Final   • ALT (SGPT) 10/22/2019 14  <=50 U/L Final   • AST (SGOT) 10/22/2019 21  17 - 59 U/L Final   • Alkaline Phosphatase 10/22/2019 132* 38 - 126 U/L Final   • Total Bilirubin 10/22/2019 0.4  0.2 - 1.3 mg/dL Final   • eGFR Non African Amer 10/22/2019 27* 42 - 98 mL/min/1.73 Final   • Globulin 10/22/2019 2.8  2.3 - 3.5 gm/dL Final   • A/G Ratio 10/22/2019 1.5  1.1 - 1.8 g/dL Final   • BUN/Creatinine Ratio 10/22/2019 18.1  7.0 - 25.0 Final   • Anion Gap 10/22/2019 12.0  5.0 - 15.0 mmol/L Final   • Hemoglobin A1C 10/22/2019 5.60  4.80 - 5.60 % Final   • LDL Cholesterol  10/22/2019 135* 0 - 100 mg/dL Final   • Vitamin B-12 10/22/2019 697  211 - 946 pg/mL Final   • Ferritin 10/22/2019 195.00  30.00 - 400.00 ng/mL Final   • Iron 10/22/2019 49* 59 - 158 mcg/dL Final   • Iron Saturation 10/22/2019 14* 20 - 50 % Final   • Transferrin 10/22/2019 234  200 - 360 mg/dL Final   • TIBC 10/22/2019 349  298 - 536 mcg/dL Final   ]

## 2019-11-05 ENCOUNTER — OFFICE VISIT (OUTPATIENT)
Dept: ONCOLOGY | Facility: CLINIC | Age: 84
End: 2019-11-05

## 2019-11-05 ENCOUNTER — LAB (OUTPATIENT)
Dept: ONCOLOGY | Facility: HOSPITAL | Age: 84
End: 2019-11-05

## 2019-11-05 VITALS
HEART RATE: 77 BPM | RESPIRATION RATE: 18 BRPM | WEIGHT: 168.2 LBS | SYSTOLIC BLOOD PRESSURE: 149 MMHG | HEIGHT: 69 IN | BODY MASS INDEX: 24.91 KG/M2 | DIASTOLIC BLOOD PRESSURE: 64 MMHG | TEMPERATURE: 97.2 F

## 2019-11-05 DIAGNOSIS — E53.8 B12 DEFICIENCY: ICD-10-CM

## 2019-11-05 DIAGNOSIS — D50.8 OTHER IRON DEFICIENCY ANEMIA: ICD-10-CM

## 2019-11-05 DIAGNOSIS — C61 MALIGNANT TUMOR OF PROSTATE (HCC): Chronic | ICD-10-CM

## 2019-11-05 LAB
ALBUMIN SERPL-MCNC: 4.2 G/DL (ref 3.5–5.2)
ALBUMIN/GLOB SERPL: 1.6 G/DL
ALP SERPL-CCNC: 135 U/L (ref 39–117)
ALT SERPL W P-5'-P-CCNC: 17 U/L (ref 1–41)
ANION GAP SERPL CALCULATED.3IONS-SCNC: 12 MMOL/L (ref 5–15)
AST SERPL-CCNC: 16 U/L (ref 1–40)
BASOPHILS # BLD AUTO: 0.07 10*3/MM3 (ref 0–0.2)
BASOPHILS NFR BLD AUTO: 0.8 % (ref 0–1.5)
BILIRUB SERPL-MCNC: 0.2 MG/DL (ref 0.2–1.2)
BUN BLD-MCNC: 42 MG/DL (ref 8–23)
BUN/CREAT SERPL: 18.1 (ref 7–25)
CALCIUM SPEC-SCNC: 9.4 MG/DL (ref 8.2–9.6)
CHLORIDE SERPL-SCNC: 104 MMOL/L (ref 98–107)
CO2 SERPL-SCNC: 24 MMOL/L (ref 22–29)
CREAT BLD-MCNC: 2.32 MG/DL (ref 0.76–1.27)
DEPRECATED RDW RBC AUTO: 48.6 FL (ref 37–54)
EOSINOPHIL # BLD AUTO: 0.05 10*3/MM3 (ref 0–0.4)
EOSINOPHIL NFR BLD AUTO: 0.6 % (ref 0.3–6.2)
ERYTHROCYTE [DISTWIDTH] IN BLOOD BY AUTOMATED COUNT: 14.6 % (ref 12.3–15.4)
FERRITIN SERPL-MCNC: 180.9 NG/ML (ref 30–400)
GFR SERPL CREATININE-BSD FRML MDRD: 27 ML/MIN/1.73
GLOBULIN UR ELPH-MCNC: 2.7 GM/DL
GLUCOSE BLD-MCNC: 138 MG/DL (ref 65–99)
HCT VFR BLD AUTO: 34.6 % (ref 37.5–51)
HGB BLD-MCNC: 11.1 G/DL (ref 13–17.7)
IMM GRANULOCYTES # BLD AUTO: 0.06 10*3/MM3 (ref 0–0.05)
IMM GRANULOCYTES NFR BLD AUTO: 0.7 % (ref 0–0.5)
IRON 24H UR-MRATE: 41 MCG/DL (ref 59–158)
IRON SATN MFR SERPL: 12 % (ref 20–50)
LYMPHOCYTES # BLD AUTO: 0.9 10*3/MM3 (ref 0.7–3.1)
LYMPHOCYTES NFR BLD AUTO: 10.7 % (ref 19.6–45.3)
MCH RBC QN AUTO: 29.2 PG (ref 26.6–33)
MCHC RBC AUTO-ENTMCNC: 32.1 G/DL (ref 31.5–35.7)
MCV RBC AUTO: 91.1 FL (ref 79–97)
MONOCYTES # BLD AUTO: 0.41 10*3/MM3 (ref 0.1–0.9)
MONOCYTES NFR BLD AUTO: 4.9 % (ref 5–12)
NEUTROPHILS # BLD AUTO: 6.89 10*3/MM3 (ref 1.7–7)
NEUTROPHILS NFR BLD AUTO: 82.3 % (ref 42.7–76)
NRBC BLD AUTO-RTO: 0 /100 WBC (ref 0–0.2)
PLATELET # BLD AUTO: 215 10*3/MM3 (ref 140–450)
PMV BLD AUTO: 10.2 FL (ref 6–12)
POTASSIUM BLD-SCNC: 4.8 MMOL/L (ref 3.5–5.2)
PROT SERPL-MCNC: 6.9 G/DL (ref 6–8.5)
RBC # BLD AUTO: 3.8 10*6/MM3 (ref 4.14–5.8)
SODIUM BLD-SCNC: 140 MMOL/L (ref 136–145)
TIBC SERPL-MCNC: 340 MCG/DL (ref 298–536)
TRANSFERRIN SERPL-MCNC: 228 MG/DL (ref 200–360)
WBC NRBC COR # BLD: 8.38 10*3/MM3 (ref 3.4–10.8)

## 2019-11-05 PROCEDURE — 80053 COMPREHEN METABOLIC PANEL: CPT | Performed by: NURSE PRACTITIONER

## 2019-11-05 PROCEDURE — 82728 ASSAY OF FERRITIN: CPT | Performed by: NURSE PRACTITIONER

## 2019-11-05 PROCEDURE — 83540 ASSAY OF IRON: CPT | Performed by: NURSE PRACTITIONER

## 2019-11-05 PROCEDURE — 84466 ASSAY OF TRANSFERRIN: CPT | Performed by: NURSE PRACTITIONER

## 2019-11-05 PROCEDURE — G0463 HOSPITAL OUTPT CLINIC VISIT: HCPCS | Performed by: NURSE PRACTITIONER

## 2019-11-05 PROCEDURE — 99213 OFFICE O/P EST LOW 20 MIN: CPT | Performed by: NURSE PRACTITIONER

## 2019-11-05 PROCEDURE — 85025 COMPLETE CBC W/AUTO DIFF WBC: CPT | Performed by: NURSE PRACTITIONER

## 2019-11-07 DIAGNOSIS — C61 MALIGNANT TUMOR OF PROSTATE (HCC): Primary | ICD-10-CM

## 2019-11-07 RX ORDER — FERROUS SULFATE 325(65) MG
325 TABLET ORAL
Qty: 30 TABLET | Refills: 3 | Status: SHIPPED | OUTPATIENT
Start: 2019-11-07 | End: 2019-12-02

## 2019-11-07 NOTE — PROGRESS NOTES
DATE OF VISIT: 11/5/2019    REASON FOR VISIT: Follow-up for iron deficiency anemia     HISTORY OF PRESENT ILLNESS:  89-year-old male with a past medical history significant for history of peripheral vascular disease status post femoropopliteal bypass, prostate cancer status post seed in 2001, history of coronary artery disease status post CABG was initially seen in consultation on December 20, 2017 for evaluation of iron deficiency anemia and not able to tolerate borderline.  Subsequently patient received 2 dose of intravenous Feraheme last of which was on January 11, 2018. His iron level improved. Recently iron levels dropped again and he was given 2 additional doses of Feraheme. His stool was checked and was negative; referral to GI was placed but patient refused to go.     Patient states he follows with cardiology in [place] and continues to follow with Dr. Rodriguez for primary care.    Recently has noticed more shortness of air with exertion and increased fatigue. Denies any  bleeding.     PAST MEDICAL HISTORY:    Past Medical History:   Diagnosis Date   • Actinic keratosis    • Acute prostatitis     h/o 2014    • Anemia      New problem noted with preoperative labs, 10/2015.. Anemia workup initiated, 10/2015      • Arthritis      Possible inflammatory arthritis bilateral knees   • Bursitis of hip     right greater trochanteric bursitis, spring 2014   • Cellulitis of lower limb     mild bilateral lower extremities   • Chronic kidney disease, stage 3 (CMS/HCC)     - cr =1.8-2.0, gradually worsening   • Claudication (CMS/HCC)     dr. you s/p numerous revascularization. medical management   • Coronary arteriosclerosis     dr. perez   • Degenerative joint disease involving multiple joints    • Disorder of lumbar spine     Lumbar laminectomy at [facility], 11/4/2015      • Dyslipidemia     Resumed statin therapy 1/2015. Lipitor   • Edema of lower extremity      Etiology undetermined. Chronic venous  insufficiency is playing a role   • Essential hypertension    • Eustachian tube disorder    • Gastroesophageal reflux disease    • Hyperlipidemia     Resumed extended interval Lipitor, 7/2015   • Idiopathic peripheral neuropathy     Gradually increasing Neurontin. Referred to Dr. Vergara, neurologist, 4/2015      • IFG (impaired fasting glucose)    • Iron deficiency anemia      New problem noted with preoperative labs, 10/2015.. Anemia workup initiated, 10/2015      • Leg cramp     nocturnal leg cramps   • Malaise and fatigue    • Malignant tumor of prostate (CMS/HCC)     6/2001 s/p brachytherapy   • Peripheral vascular disease (CMS/HCC)     PAD/claudication. Dr. Brennan      • Peripheral venous insufficiency     Chronic venous insufficiency bilateral lower extremities, left worse than right      • Pneumonia     H/O   • Pruritic rash     Autoimmune. Responds to methotrexate. Dr. Oliveros, dermatology. The patient stopped the methotrexate and declines resumption. Started low-dose prednisone, 4/2016      • Type 2 diabetes mellitus (CMS/HCC)     Newly diagnosed, 8/2015. Started Amaryl, 8/2015. Metformin and pioglitizone contraindicated   • Type 2 diabetes mellitus with chronic kidney disease, without long-term current use of insulin (CMS/HCC)     Newly diagnosed, 8/2015. Started Amaryl, 8/2015. Metformin and pioglitizone contraindicated   • UTI (urinary tract infection)     H/O,site not specified-possible       SOCIAL HISTORY:    Social History     Tobacco Use   • Smoking status: Former Smoker     Types: Cigarettes   • Smokeless tobacco: Never Used   • Tobacco comment: 40 years ago   Substance Use Topics   • Alcohol use: No   • Drug use: No       Surgical History :  Past Surgical History:   Procedure Laterality Date   • CORONARY ARTERY BYPASS GRAFT      vein,two  09/2006   • ENDOSCOPY AND COLONOSCOPY      7/2011 declines   • OTHER SURGICAL HISTORY      femoral-popliteal bypass graft - right leg 1997/left leg 2007   • OTHER  "SURGICAL HISTORY      laser surgery of prostate, - radiation implant;  Last Performed: 06/2001   • OTHER SURGICAL HISTORY      low back disk surgery , - Lumbar laminectomy with L4/L5 mechanical fusion;  Last Performed: 11/2015   • OTHER SURGICAL HISTORY      remove impacted cerumen 4/06/16       ALLERGIES:    Allergies   Allergen Reactions   • Bacitracin Rash   • Formaldehyde Rash   • Framycetin Rash     All Fragrances   • Neomycin Rash   • Nickel Rash       REVIEW OF SYSTEMS:      CONSTITUTIONAL:  No fever, chills, or night sweats. +fatigue    HEENT:  No epistaxis, mouth sores, or difficulty swallowing.    RESPIRATORY:  Increase shortness of air with exertion; denies any cough at present.    CARDIOVASCULAR:  No chest pain or palpitations.    GASTROINTESTINAL:  No abdominal pain, nausea, vomiting, or blood in the stool.    GENITOURINARY:  No dysuria or hematuria.    MUSCULOSKELETAL:  No any new back pain or arthralgias.     NEUROLOGICAL:  No tingling or numbness. No new headache or dizziness.     LYMPHATICS:  Denies any abnormal swollen and anywhere in the body.    SKIN:  Denies any new skin rash.    PHYSICAL EXAMINATION:      VITAL SIGNS:  /64   Pulse 77   Temp 97.2 °F (36.2 °C)   Resp 18   Ht 175.3 cm (69\")   Wt 76.3 kg (168 lb 3.2 oz)   BMI 24.84 kg/m²     GENERAL:  Not in any distress.    HEENT:  Normocephalic, Atraumatic.Mild Conjunctival pallor. No icterus. No Facial Asymmetry noted.    NECK:  No adenopathy. No JVD.    RESPIRATORY:  Fair air entry bilateral. No rhonchi or wheezing.    CARDIOVASCULAR:  S1, S2. Regular rate and rhythm. No murmur or gallop appreciated.    ABDOMEN:  Soft, obese, nontender. Bowel sounds present in all four quadrants.  No organomegaly appreciated.    EXTREMITIES:  No edema.No Calf Tenderness.    NEUROLOGIC:  Alert, awake and oriented ×3.      SKIN : No new skin lesion identified  DIAGNOSTIC DATA:    Glucose   Date Value Ref Range Status   11/05/2019 138 (H) 65 - 99 mg/dL " Final     Sodium   Date Value Ref Range Status   11/05/2019 140 136 - 145 mmol/L Final     Potassium   Date Value Ref Range Status   11/05/2019 4.8 3.5 - 5.2 mmol/L Final     CO2   Date Value Ref Range Status   11/05/2019 24.0 22.0 - 29.0 mmol/L Final     Chloride   Date Value Ref Range Status   11/05/2019 104 98 - 107 mmol/L Final     Anion Gap   Date Value Ref Range Status   11/05/2019 12.0 5.0 - 15.0 mmol/L Final     Creatinine   Date Value Ref Range Status   11/05/2019 2.32 (H) 0.76 - 1.27 mg/dL Final     BUN   Date Value Ref Range Status   11/05/2019 42 (H) 8 - 23 mg/dL Final     BUN/Creatinine Ratio   Date Value Ref Range Status   11/05/2019 18.1 7.0 - 25.0 Final     Calcium   Date Value Ref Range Status   11/05/2019 9.4 8.2 - 9.6 mg/dL Final     eGFR Non  Amer   Date Value Ref Range Status   11/05/2019 27 (L) >60 mL/min/1.73 Final     Alkaline Phosphatase   Date Value Ref Range Status   11/05/2019 135 (H) 39 - 117 U/L Final     Total Protein   Date Value Ref Range Status   11/05/2019 6.9 6.0 - 8.5 g/dL Final     ALT (SGPT)   Date Value Ref Range Status   11/05/2019 17 1 - 41 U/L Final     AST (SGOT)   Date Value Ref Range Status   11/05/2019 16 1 - 40 U/L Final     Total Bilirubin   Date Value Ref Range Status   11/05/2019 0.2 0.2 - 1.2 mg/dL Final     Albumin   Date Value Ref Range Status   11/05/2019 4.20 3.50 - 5.20 g/dL Final     Globulin   Date Value Ref Range Status   11/05/2019 2.7 gm/dL Final     Lab Results   Component Value Date    WBC 8.38 11/05/2019    HGB 11.1 (L) 11/05/2019    HCT 34.6 (L) 11/05/2019    MCV 91.1 11/05/2019     11/05/2019     Lab Results   Component Value Date    NEUTROABS 6.89 11/05/2019    IRON 41 (L) 11/05/2019    TIBC 340 11/05/2019    LABIRON 12 (L) 11/05/2019    FERRITIN 180.90 11/05/2019    FYPTLTEZ18 697 10/22/2019    FOLATE 18.10 02/11/2019     No results found for: , LABCA2, AFPTM, HCGQUANT, , CHROMGRNA, [id number], CEA,  PEACE]        ASSESSMENT AND PLAN:       1. Anemia; multiple reasons; component of Iron deficiency anemia secondary to possible GI bleed and component of CKD stage III;  He received IV iron back in January 2018 due to his inability to tolerate PO iron; his iron levels improved; recently levels dropped again and he was given 2 additional doses of iron; stool occult was negative and GI referral was placed but pt refused to go. His Hgb today is stable at 11.2; iron and saturation are only marginally low; will try him on PO iron again; if he cant tolerate then will consider IV replacement. Repeat labs again in 3-4 mos.      2.  Chronic kidney disease stage III; pt was following with nephrology at [facility]; states he does not follow with anyone at this time; GFR today is 27; will defer to his PCP.     3.  Prostate cancer status post seed implant. Being followed by different provider, not managed by our clinic.     4.  Peripheral vascular disease status post femoropopliteal bypass. Follows with cardiologist in [place].       This document has been signed by JUAN M Hernandez on November 7, 2019 9:12 AM

## 2019-11-08 ENCOUNTER — TELEPHONE (OUTPATIENT)
Dept: ONCOLOGY | Facility: HOSPITAL | Age: 84
End: 2019-11-08

## 2019-11-08 NOTE — TELEPHONE ENCOUNTER
----- Message from JUAN M Hernandez sent at 11/8/2019  1:14 PM CST -----  Ok so we will just recheck at next visit, his insruance wont pay for IV iron  ----- Message -----  From: Thania Freitas, RN  Sent: 11/7/2019  10:41 AM  To: JUAN M Hernandez    Informed patient. He said he is unable to tolerate oral iron and doesn't want to take it.   I gave him appointment for 3 months.   Do you want IV iron?    ----- Message -----  From: Char Soto APRN  Sent: 11/7/2019   9:18 AM  To: Thania Freitas RN    Let pt know sent RX for iron to pharmacy and then we need to recheck him again in 3- 4mos  CBC  CMP  Yves  IRon profile  B12/folate    Thanks  Char

## 2019-11-18 RX ORDER — AMLODIPINE BESYLATE 5 MG/1
TABLET ORAL
Qty: 30 TABLET | Refills: 11 | OUTPATIENT
Start: 2019-11-18

## 2019-12-02 ENCOUNTER — OFFICE VISIT (OUTPATIENT)
Dept: FAMILY MEDICINE CLINIC | Facility: CLINIC | Age: 84
End: 2019-12-02

## 2019-12-02 VITALS
SYSTOLIC BLOOD PRESSURE: 114 MMHG | HEIGHT: 69 IN | OXYGEN SATURATION: 97 % | BODY MASS INDEX: 25.03 KG/M2 | WEIGHT: 169 LBS | TEMPERATURE: 98.6 F | DIASTOLIC BLOOD PRESSURE: 64 MMHG | HEART RATE: 64 BPM

## 2019-12-02 DIAGNOSIS — J44.1 COPD EXACERBATION (HCC): ICD-10-CM

## 2019-12-02 DIAGNOSIS — J18.9 PNEUMONIA OF LEFT LOWER LOBE DUE TO INFECTIOUS ORGANISM: Primary | ICD-10-CM

## 2019-12-02 DIAGNOSIS — Z86.59 MENTAL STATUS CHANGE RESOLVED: ICD-10-CM

## 2019-12-02 DIAGNOSIS — I10 ESSENTIAL HYPERTENSION: Chronic | ICD-10-CM

## 2019-12-02 DIAGNOSIS — T81.49XA WOUND INFECTION AFTER SURGERY: ICD-10-CM

## 2019-12-02 DIAGNOSIS — N18.30 CHRONIC KIDNEY DISEASE, STAGE 3 (HCC): Chronic | ICD-10-CM

## 2019-12-02 PROCEDURE — 96372 THER/PROPH/DIAG INJ SC/IM: CPT | Performed by: INTERNAL MEDICINE

## 2019-12-02 PROCEDURE — 99214 OFFICE O/P EST MOD 30 MIN: CPT | Performed by: INTERNAL MEDICINE

## 2019-12-02 RX ORDER — METHYLPREDNISOLONE ACETATE 80 MG/ML
80 INJECTION, SUSPENSION INTRA-ARTICULAR; INTRALESIONAL; INTRAMUSCULAR; SOFT TISSUE ONCE
Status: COMPLETED | OUTPATIENT
Start: 2019-12-02 | End: 2019-12-02

## 2019-12-02 RX ORDER — ALBUTEROL SULFATE 90 UG/1
2 AEROSOL, METERED RESPIRATORY (INHALATION) EVERY 4 HOURS PRN
Qty: 1 INHALER | Refills: 11 | Status: SHIPPED | OUTPATIENT
Start: 2019-12-02 | End: 2020-01-10

## 2019-12-02 RX ORDER — TRIAMCINOLONE ACETONIDE 40 MG/ML
20 INJECTION, SUSPENSION INTRA-ARTICULAR; INTRAMUSCULAR ONCE
Status: COMPLETED | OUTPATIENT
Start: 2019-12-02 | End: 2019-12-02

## 2019-12-02 RX ORDER — AMOXICILLIN AND CLAVULANATE POTASSIUM 500; 125 MG/1; MG/1
500 TABLET, FILM COATED ORAL
COMMUNITY
Start: 2019-11-28 | End: 2019-12-05 | Stop reason: SDUPTHER

## 2019-12-02 RX ADMIN — METHYLPREDNISOLONE ACETATE 80 MG: 80 INJECTION, SUSPENSION INTRA-ARTICULAR; INTRALESIONAL; INTRAMUSCULAR; SOFT TISSUE at 16:47

## 2019-12-02 RX ADMIN — TRIAMCINOLONE ACETONIDE 20 MG: 40 INJECTION, SUSPENSION INTRA-ARTICULAR; INTRAMUSCULAR at 16:47

## 2019-12-02 NOTE — PROGRESS NOTES
"Subjective          History of Present Illness     Sy Florez is a 89 y.o. male who comes in for ER follow up.  He presented to [facility] on 11/28/2019 with generalized malaise, confusion, and feeling lightheaded in addition to a nonproductive cough.  He denies fever.  Chest x-ray demonstrated pneumonia of left lower lobe, for which he was discharged with a 10-day course of Augmentin 500 mg twice daily after declining the option of hospital admission.  His confusion cleared quickly in the ER.  He is out of his inhaler and doesn't have nebulizer machine at home.  He has not taken his prednisone for the past 3-4 days.  He continues to have cough productive of yellow sputum.  Denies visualizing blood in the sputum.      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  Wound culture was positive for community acquired staph.  Minocycline was stopped by the hospital, when Augmentin was started.  Dermatology is addressing this issue.       Review of Systems   Constitutional: Positive for fatigue. Negative for chills and fever.   HENT: Negative for congestion, ear pain, postnasal drip, sinus pressure and sore throat.    Respiratory: Positive for cough and wheezing. Negative for shortness of breath.    Cardiovascular: Negative for chest pain, palpitations and leg swelling.   Gastrointestinal: Negative for abdominal pain, blood in stool, constipation, diarrhea, nausea and vomiting.   Endocrine: Negative for cold intolerance, heat intolerance, polydipsia and polyuria.   Genitourinary: Negative for dysuria, frequency, hematuria and urgency.   Skin: Negative for rash.   Neurological: Positive for weakness. Negative for syncope.      Objective     Visit Vitals  /64   Pulse 64   Temp 98.6 °F (37 °C) (Oral)   Ht 175.3 cm (69\")   Wt 76.7 kg (169 lb)   SpO2 97%   BMI 24.96 kg/m²     Physical Exam   Constitutional: He is oriented to person, place, and time. He " appears well-developed and well-nourished. No distress.   Accompanied by his daughter.  Ambulating with a walker.    HENT:   Head: Normocephalic and atraumatic.   Nose: Right sinus exhibits no maxillary sinus tenderness and no frontal sinus tenderness. Left sinus exhibits no maxillary sinus tenderness and no frontal sinus tenderness.   Mouth/Throat: Uvula is midline, oropharynx is clear and moist and mucous membranes are normal. No oral lesions. No tonsillar exudate.   Eyes: Conjunctivae and EOM are normal. Pupils are equal, round, and reactive to light.   Neck: Trachea normal. Neck supple. No JVD present. Carotid bruit is not present. No tracheal deviation present. No thyroid mass and no thyromegaly present.   Cardiovascular: Normal rate, regular rhythm, normal heart sounds and intact distal pulses.  No extrasystoles are present. PMI is not displaced.   No murmur heard.  Heart rate in the 60s.    Pulmonary/Chest: Effort normal. No accessory muscle usage. No respiratory distress. He has no decreased breath sounds. He has wheezes. He has rhonchi. He has no rales.   Diminished excursion on inspiratory and expiratory phase.  Expiratory wheezes and scattered rhonchi.    Abdominal: Soft. Bowel sounds are normal. He exhibits no distension. There is no hepatosplenomegaly. There is no tenderness.     Vascular Status -  His right foot exhibits normal foot vasculature  and no edema. His left foot exhibits normal foot vasculature  and no edema.  Lymphadenopathy:     He has no cervical adenopathy.   Neurological: He is alert and oriented to person, place, and time. No cranial nerve deficit. Coordination normal.   Skin: Skin is warm, dry and intact. No rash noted. No cyanosis. Nails show no clubbing.   Exam of the left deltoid area reveals some wound dehiscence at the skin cancer incision site.  There is a small amount of serous drainage, but no foul odor or purulence.  There is some surrounding mild erythema.   Psychiatric: He  has a normal mood and affect. His speech is normal and behavior is normal. Judgment and thought content normal.   Vitals reviewed.    Future Appointments   Date Time Provider Department Center   2/7/2020  2:00 PM NURSE [facility] OPI [facility]   2/7/2020  2:30 PM Char Soto, JUAN M [facility]C MAD [facility]   3/2/2020  2:30 PM John Rodriguez, MD [facility] POW None         Assessment/Plan      The left pneumonia is a new problem.  We will repeat a chest x-ray on Thursday of this week to check improvement of the left lower lobe pneumonia.  Continue the Augmentin as prescribed in the ER.  Prescription is sent for albuterol inhaler to inhale 2 puffs every 4 hours as needed for wheezing.   Sample of Breo is given today to use on inhalation daily.  Resume the prednisone 5 mg daily today.  After informed verbal consent, patient is given Kenalog 20 mg and Depo-Medrol 80 mg IM without difficulty or complications.  Patient tolerated well without complications.        The wound infection is a new problem.  I recommended resuming the minocycline to treat the staph infection demonstrated on wound culture post-removal of sqaumous cell skin cancer.  Patient's daughter will discuss this with dermatology.  Wound care discussed.  Prescription is given for Tegaderm.  They have Polymem at home.  They also have Bactroban at home, which he can resume.  Follow-up with plastic surgery in 2 days, as scheduled.    Continue to avoid NSAIDs and other nephrotoxic drugs.    Return in March for routine follow up with fasting labs one week prior or sooner if needed.     Scribed for Dr. Rodriguez by Johanna Reyes [facility].     Diagnoses and all orders for this visit:    Pneumonia of left lower lobe due to infectious organism (CMS/HCC)  -     XR Chest PA & Lateral    Wound infection after surgery    COPD exacerbation (CMS/HCC)  -     methylPREDNISolone acetate (DEPO-medrol) injection 80 mg  -     triamcinolone acetonide (KENALOG-40) injection 20 mg    Chronic kidney  disease, stage 3 (CMS/HCC)    Essential hypertension    Mental status change resolved    Other orders  -     amoxicillin-clavulanate (AUGMENTIN) 500-125 MG per tablet; Take 500 mg by mouth.  -     albuterol sulfate  (90 Base) MCG/ACT inhaler; Inhale 2 puffs Every 4 (Four) Hours As Needed for Wheezing.        No visits with results within 3 Week(s) from this visit.   Latest known visit with results is:   Lab on 11/05/2019   Component Date Value Ref Range Status   • Glucose 11/05/2019 138* 65 - 99 mg/dL Final   • BUN 11/05/2019 42* 8 - 23 mg/dL Final   • Creatinine 11/05/2019 2.32* 0.76 - 1.27 mg/dL Final   • Sodium 11/05/2019 140  136 - 145 mmol/L Final   • Potassium 11/05/2019 4.8  3.5 - 5.2 mmol/L Final   • Chloride 11/05/2019 104  98 - 107 mmol/L Final   • CO2 11/05/2019 24.0  22.0 - 29.0 mmol/L Final   • Calcium 11/05/2019 9.4  8.2 - 9.6 mg/dL Final   • Total Protein 11/05/2019 6.9  6.0 - 8.5 g/dL Final   • Albumin 11/05/2019 4.20  3.50 - 5.20 g/dL Final   • ALT (SGPT) 11/05/2019 17  1 - 41 U/L Final   • AST (SGOT) 11/05/2019 16  1 - 40 U/L Final   • Alkaline Phosphatase 11/05/2019 135* 39 - 117 U/L Final   • Total Bilirubin 11/05/2019 0.2  0.2 - 1.2 mg/dL Final   • eGFR Non African Amer 11/05/2019 27* >60 mL/min/1.73 Final   • Globulin 11/05/2019 2.7  gm/dL Final   • A/G Ratio 11/05/2019 1.6  g/dL Final   • BUN/Creatinine Ratio 11/05/2019 18.1  7.0 - 25.0 Final   • Anion Gap 11/05/2019 12.0  5.0 - 15.0 mmol/L Final   • Ferritin 11/05/2019 180.90  30.00 - 400.00 ng/mL Final   • Iron 11/05/2019 41* 59 - 158 mcg/dL Final   • Iron Saturation 11/05/2019 12* 20 - 50 % Final   • Transferrin 11/05/2019 228  200 - 360 mg/dL Final   • TIBC 11/05/2019 340  298 - 536 mcg/dL Final   • WBC 11/05/2019 8.38  3.40 - 10.80 10*3/mm3 Final   • RBC 11/05/2019 3.80* 4.14 - 5.80 10*6/mm3 Final   • Hemoglobin 11/05/2019 11.1* 13.0 - 17.7 g/dL Final   • Hematocrit 11/05/2019 34.6* 37.5 - 51.0 % Final   • MCV 11/05/2019 91.1  79.0  - 97.0 fL Final   • MCH 11/05/2019 29.2  26.6 - 33.0 pg Final   • MCHC 11/05/2019 32.1  31.5 - 35.7 g/dL Final   • RDW 11/05/2019 14.6  12.3 - 15.4 % Final   • RDW-SD 11/05/2019 48.6  37.0 - 54.0 fl Final   • MPV 11/05/2019 10.2  6.0 - 12.0 fL Final   • Platelets 11/05/2019 215  140 - 450 10*3/mm3 Final   • Neutrophil % 11/05/2019 82.3* 42.7 - 76.0 % Final   • Lymphocyte % 11/05/2019 10.7* 19.6 - 45.3 % Final   • Monocyte % 11/05/2019 4.9* 5.0 - 12.0 % Final   • Eosinophil % 11/05/2019 0.6  0.3 - 6.2 % Final   • Basophil % 11/05/2019 0.8  0.0 - 1.5 % Final   • Immature Grans % 11/05/2019 0.7* 0.0 - 0.5 % Final   • Neutrophils, Absolute 11/05/2019 6.89  1.70 - 7.00 10*3/mm3 Final   • Lymphocytes, Absolute 11/05/2019 0.90  0.70 - 3.10 10*3/mm3 Final   • Monocytes, Absolute 11/05/2019 0.41  0.10 - 0.90 10*3/mm3 Final   • Eosinophils, Absolute 11/05/2019 0.05  0.00 - 0.40 10*3/mm3 Final   • Basophils, Absolute 11/05/2019 0.07  0.00 - 0.20 10*3/mm3 Final   • Immature Grans, Absolute 11/05/2019 0.06* 0.00 - 0.05 10*3/mm3 Final   • nRBC 11/05/2019 0.0  0.0 - 0.2 /100 WBC Final   ]

## 2019-12-05 DIAGNOSIS — J18.9 PNEUMONIA OF LEFT LOWER LOBE DUE TO INFECTIOUS ORGANISM: Primary | ICD-10-CM

## 2019-12-05 RX ORDER — AMOXICILLIN AND CLAVULANATE POTASSIUM 500; 125 MG/1; MG/1
500 TABLET, FILM COATED ORAL 2 TIMES DAILY
Qty: 14 TABLET | Refills: 0 | Status: SHIPPED | OUTPATIENT
Start: 2019-12-05 | End: 2019-12-12

## 2019-12-18 RX ORDER — RAMIPRIL 5 MG/1
CAPSULE ORAL
Qty: 30 CAPSULE | Refills: 5 | OUTPATIENT
Start: 2019-12-18

## 2019-12-18 RX ORDER — RAMIPRIL 5 MG/1
5 CAPSULE ORAL DAILY
Qty: 30 CAPSULE | Refills: 5 | Status: SHIPPED | OUTPATIENT
Start: 2019-12-18 | End: 2020-03-02 | Stop reason: SDUPTHER

## 2020-01-14 ENCOUNTER — PRIOR AUTHORIZATION (OUTPATIENT)
Dept: FAMILY MEDICINE CLINIC | Facility: CLINIC | Age: 85
End: 2020-01-14

## 2020-01-15 RX ORDER — FUROSEMIDE 40 MG/1
TABLET ORAL
Qty: 30 TABLET | Refills: 11 | Status: SHIPPED | OUTPATIENT
Start: 2020-01-15 | End: 2021-01-01 | Stop reason: ALTCHOICE

## 2020-02-07 ENCOUNTER — LAB (OUTPATIENT)
Dept: ONCOLOGY | Facility: HOSPITAL | Age: 85
End: 2020-02-07

## 2020-02-07 ENCOUNTER — OFFICE VISIT (OUTPATIENT)
Dept: ONCOLOGY | Facility: CLINIC | Age: 85
End: 2020-02-07

## 2020-02-07 VITALS
WEIGHT: 170.8 LBS | DIASTOLIC BLOOD PRESSURE: 61 MMHG | TEMPERATURE: 97.4 F | HEART RATE: 62 BPM | BODY MASS INDEX: 25.3 KG/M2 | SYSTOLIC BLOOD PRESSURE: 133 MMHG | RESPIRATION RATE: 16 BRPM | HEIGHT: 69 IN

## 2020-02-07 DIAGNOSIS — D50.8 OTHER IRON DEFICIENCY ANEMIA: Primary | Chronic | ICD-10-CM

## 2020-02-07 DIAGNOSIS — C61 MALIGNANT TUMOR OF PROSTATE (HCC): ICD-10-CM

## 2020-02-07 LAB
ALBUMIN SERPL-MCNC: 4.6 G/DL (ref 3.5–5.2)
ALBUMIN/GLOB SERPL: 1.6 G/DL
ALP SERPL-CCNC: 124 U/L (ref 39–117)
ALT SERPL W P-5'-P-CCNC: 14 U/L (ref 1–41)
ANION GAP SERPL CALCULATED.3IONS-SCNC: 14 MMOL/L (ref 5–15)
AST SERPL-CCNC: 15 U/L (ref 1–40)
BASOPHILS # BLD AUTO: 0.05 10*3/MM3 (ref 0–0.2)
BASOPHILS NFR BLD AUTO: 0.6 % (ref 0–1.5)
BILIRUB SERPL-MCNC: 0.4 MG/DL (ref 0.2–1.2)
BUN BLD-MCNC: 42 MG/DL (ref 8–23)
BUN/CREAT SERPL: 20.6 (ref 7–25)
CALCIUM SPEC-SCNC: 10.2 MG/DL (ref 8.2–9.6)
CHLORIDE SERPL-SCNC: 102 MMOL/L (ref 98–107)
CO2 SERPL-SCNC: 25 MMOL/L (ref 22–29)
CREAT BLD-MCNC: 2.04 MG/DL (ref 0.76–1.27)
DEPRECATED RDW RBC AUTO: 58.2 FL (ref 37–54)
EOSINOPHIL # BLD AUTO: 0.01 10*3/MM3 (ref 0–0.4)
EOSINOPHIL NFR BLD AUTO: 0.1 % (ref 0.3–6.2)
ERYTHROCYTE [DISTWIDTH] IN BLOOD BY AUTOMATED COUNT: 17.6 % (ref 12.3–15.4)
FERRITIN SERPL-MCNC: 177.7 NG/ML (ref 30–400)
FOLATE SERPL-MCNC: 15.6 NG/ML (ref 4.78–24.2)
GFR SERPL CREATININE-BSD FRML MDRD: 31 ML/MIN/1.73
GLOBULIN UR ELPH-MCNC: 2.8 GM/DL
GLUCOSE BLD-MCNC: 124 MG/DL (ref 65–99)
HCT VFR BLD AUTO: 37.9 % (ref 37.5–51)
HGB BLD-MCNC: 12 G/DL (ref 13–17.7)
IMM GRANULOCYTES # BLD AUTO: 0.14 10*3/MM3 (ref 0–0.05)
IMM GRANULOCYTES NFR BLD AUTO: 1.6 % (ref 0–0.5)
IRON 24H UR-MRATE: 68 MCG/DL (ref 59–158)
IRON SATN MFR SERPL: 17 % (ref 20–50)
LYMPHOCYTES # BLD AUTO: 0.98 10*3/MM3 (ref 0.7–3.1)
LYMPHOCYTES NFR BLD AUTO: 11.2 % (ref 19.6–45.3)
MCH RBC QN AUTO: 28.6 PG (ref 26.6–33)
MCHC RBC AUTO-ENTMCNC: 31.7 G/DL (ref 31.5–35.7)
MCV RBC AUTO: 90.5 FL (ref 79–97)
MONOCYTES # BLD AUTO: 0.48 10*3/MM3 (ref 0.1–0.9)
MONOCYTES NFR BLD AUTO: 5.5 % (ref 5–12)
NEUTROPHILS # BLD AUTO: 7.06 10*3/MM3 (ref 1.7–7)
NEUTROPHILS NFR BLD AUTO: 81 % (ref 42.7–76)
NRBC BLD AUTO-RTO: 0 /100 WBC (ref 0–0.2)
PLATELET # BLD AUTO: 206 10*3/MM3 (ref 140–450)
PMV BLD AUTO: 10.6 FL (ref 6–12)
POTASSIUM BLD-SCNC: 5.1 MMOL/L (ref 3.5–5.2)
PROT SERPL-MCNC: 7.4 G/DL (ref 6–8.5)
RBC # BLD AUTO: 4.19 10*6/MM3 (ref 4.14–5.8)
SODIUM BLD-SCNC: 141 MMOL/L (ref 136–145)
TIBC SERPL-MCNC: 392 MCG/DL (ref 298–536)
TRANSFERRIN SERPL-MCNC: 263 MG/DL (ref 200–360)
VIT B12 BLD-MCNC: 559 PG/ML (ref 211–946)
WBC NRBC COR # BLD: 8.72 10*3/MM3 (ref 3.4–10.8)

## 2020-02-07 PROCEDURE — G0463 HOSPITAL OUTPT CLINIC VISIT: HCPCS | Performed by: NURSE PRACTITIONER

## 2020-02-07 PROCEDURE — 99213 OFFICE O/P EST LOW 20 MIN: CPT | Performed by: NURSE PRACTITIONER

## 2020-02-07 PROCEDURE — 84466 ASSAY OF TRANSFERRIN: CPT | Performed by: NURSE PRACTITIONER

## 2020-02-07 PROCEDURE — 82728 ASSAY OF FERRITIN: CPT | Performed by: NURSE PRACTITIONER

## 2020-02-07 PROCEDURE — 83540 ASSAY OF IRON: CPT | Performed by: NURSE PRACTITIONER

## 2020-02-07 PROCEDURE — 80053 COMPREHEN METABOLIC PANEL: CPT | Performed by: NURSE PRACTITIONER

## 2020-02-07 PROCEDURE — 82607 VITAMIN B-12: CPT | Performed by: NURSE PRACTITIONER

## 2020-02-07 PROCEDURE — 82746 ASSAY OF FOLIC ACID SERUM: CPT | Performed by: NURSE PRACTITIONER

## 2020-02-07 PROCEDURE — 85025 COMPLETE CBC W/AUTO DIFF WBC: CPT | Performed by: NURSE PRACTITIONER

## 2020-02-07 NOTE — PROGRESS NOTES
DATE OF VISIT: 2/7/2020    REASON FOR VISIT:  Follow-up for iron deficiency anemia     HISTORY OF PRESENT ILLNESS:  89-year-old male with a past medical history significant for history of peripheral vascular disease status post femoropopliteal bypass, prostate cancer status post seed in 2001, history of coronary artery disease status post CABG was initially seen in consultation on December 20, 2017 for evaluation of iron deficiency anemia and not able to tolerate borderline.  Subsequently patient received 2 dose of intravenous Feraheme last of which was on January 11, 2018. His iron level improved. Recently iron levels dropped again and he was given 2 additional doses of Feraheme. His stool was checked and was negative; referral to GI was placed but patient refused to go.  Patient states he follows with cardiology in [place] and continues to follow with Dr. Rodriguez for primary care.    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      PAST MEDICAL HISTORY:    Past Medical History:   Diagnosis Date   • Actinic keratosis    • Acute prostatitis     h/o 2014    • Anemia      New problem noted with preoperative labs, 10/2015.. Anemia workup initiated, 10/2015      • Arthritis      Possible inflammatory arthritis bilateral knees   • Bursitis of hip     right greater trochanteric bursitis, spring 2014   • Cellulitis of lower limb     mild bilateral lower extremities   • Chronic kidney disease, stage 3 (CMS/HCC)     - cr =1.8-2.0, gradually worsening   • Claudication (CMS/HCC)     dr. you s/p numerous revascularization. medical management   • Coronary arteriosclerosis     dr. perez   • Degenerative joint disease involving multiple joints    • Disorder of lumbar spine     Lumbar laminectomy at [facility], 11/4/2015      • Dyslipidemia     Resumed statin therapy 1/2015. Lipitor   • Edema of lower extremity      Etiology undetermined. Chronic venous insufficiency is  playing a role   • Essential hypertension    • Eustachian tube disorder    • Gastroesophageal reflux disease    • Hyperlipidemia     Resumed extended interval Lipitor, 7/2015   • Idiopathic peripheral neuropathy     Gradually increasing Neurontin. Referred to Dr. Veragra, neurologist, 4/2015      • IFG (impaired fasting glucose)    • Iron deficiency anemia      New problem noted with preoperative labs, 10/2015.. Anemia workup initiated, 10/2015      • Leg cramp     nocturnal leg cramps   • Malaise and fatigue    • Malignant tumor of prostate (CMS/HCC)     6/2001 s/p brachytherapy   • Peripheral vascular disease (CMS/HCC)     PAD/claudication. Dr. Brnenan      • Peripheral venous insufficiency     Chronic venous insufficiency bilateral lower extremities, left worse than right      • Pneumonia     H/O   • Pruritic rash     Autoimmune. Responds to methotrexate. Dr. Oliveros, dermatology. The patient stopped the methotrexate and declines resumption. Started low-dose prednisone, 4/2016      • Type 2 diabetes mellitus (CMS/HCC)     Newly diagnosed, 8/2015. Started Amaryl, 8/2015. Metformin and pioglitizone contraindicated   • Type 2 diabetes mellitus with chronic kidney disease, without long-term current use of insulin (CMS/HCC)     Newly diagnosed, 8/2015. Started Amaryl, 8/2015. Metformin and pioglitizone contraindicated   • UTI (urinary tract infection)     H/O,site not specified-possible       SOCIAL HISTORY:    Social History     Tobacco Use   • Smoking status: Former Smoker     Types: Cigarettes   • Smokeless tobacco: Never Used   • Tobacco comment: 40 years ago   Substance Use Topics   • Alcohol use: No   • Drug use: No       Surgical History :  Past Surgical History:   Procedure Laterality Date   • CORONARY ARTERY BYPASS GRAFT      vein,two  09/2006   • ENDOSCOPY AND COLONOSCOPY      7/2011 declines   • OTHER SURGICAL HISTORY      femoral-popliteal bypass graft - right leg 1997/left leg 2007   • OTHER SURGICAL HISTORY  "     laser surgery of prostate, - radiation implant;  Last Performed: 06/2001   • OTHER SURGICAL HISTORY      low back disk surgery , - Lumbar laminectomy with L4/L5 mechanical fusion;  Last Performed: 11/2015   • OTHER SURGICAL HISTORY      remove impacted cerumen 4/06/16       ALLERGIES:    Allergies   Allergen Reactions   • Bacitracin Rash   • Formaldehyde Rash   • Framycetin Rash     All Fragrances   • Neomycin Rash   • Nickel Rash       REVIEW OF SYSTEMS:      CONSTITUTIONAL:  No fever, chills, or night sweats.     HEENT:  No epistaxis, mouth sores, or difficulty swallowing.    RESPIRATORY:  No new shortness of breath or cough at present.    CARDIOVASCULAR:  No chest pain or palpitations.    GASTROINTESTINAL:  No abdominal pain, nausea, vomiting, or blood in the stool.    GENITOURINARY:  No dysuria or hematuria.    MUSCULOSKELETAL:  No any new back pain or arthralgias.     NEUROLOGICAL:  No tingling or numbness. No new headache or dizziness.     LYMPHATICS:  Denies any abnormal swollen and anywhere in the body.    SKIN:  Denies any new skin rash.    PHYSICAL EXAMINATION:      VITAL SIGNS:  /61   Pulse 62   Temp 97.4 °F (36.3 °C) (Temporal)   Resp 16   Ht 175.3 cm (69.02\")   Wt 77.5 kg (170 lb 12.8 oz)   BMI 25.21 kg/m²     GENERAL:  Not in any distress.    HEENT:  Normocephalic, Atraumatic.Mild Conjunctival pallor. No icterus.  No Facial Asymmetry noted.    NECK:  No adenopathy. No JVD.    RESPIRATORY:  Fair air entry bilateral. No rhonchi or wheezing.    CARDIOVASCULAR:  S1, S2. Regular rate and rhythm. No murmur or gallop appreciated.    ABDOMEN:  Soft, obese, nontender. Bowel sounds present in all four quadrants.  No organomegaly appreciated.    EXTREMITIES:  No edema.No Calf Tenderness.    NEUROLOGIC:  Alert, awake and oriented ×3.  No  Motor or sensory deficit appreciated. Cranial Nerves 2-12 grossly intact.    SKIN : No new skin lesion identified  DIAGNOSTIC DATA:    Glucose   Date Value Ref " Range Status   02/07/2020 124 (H) 65 - 99 mg/dL Final     Sodium   Date Value Ref Range Status   02/07/2020 141 136 - 145 mmol/L Final   11/28/2019 141 136 - 145 mmol/L Final     Potassium   Date Value Ref Range Status   02/07/2020 5.1 3.5 - 5.2 mmol/L Final   11/28/2019 4.7 3.5 - 5.1 mmol/L Final     CO2   Date Value Ref Range Status   02/07/2020 25.0 22.0 - 29.0 mmol/L Final     Chloride   Date Value Ref Range Status   02/07/2020 102 98 - 107 mmol/L Final   11/28/2019 105 98 - 107 mmol/L Final     Anion Gap   Date Value Ref Range Status   02/07/2020 14.0 5.0 - 15.0 mmol/L Final     Creatinine   Date Value Ref Range Status   02/07/2020 2.04 (H) 0.76 - 1.27 mg/dL Final   11/28/2019 2.6 (H) 0.7 - 1.3 mg/dL Final     Comment:     **The MDRD GFR formula is valid only for ages 18-70.    GFR will not be reported for individuals aging <18 or >70.     BUN   Date Value Ref Range Status   02/07/2020 42 (H) 8 - 23 mg/dL Final   11/28/2019 51 (H) 7 - 18 mg/dL Final     BUN/Creatinine Ratio   Date Value Ref Range Status   02/07/2020 20.6 7.0 - 25.0 Final     Calcium   Date Value Ref Range Status   02/07/2020 10.2 (H) 8.2 - 9.6 mg/dL Final   11/28/2019 9.2 8.5 - 10.1 mg/dL Final     eGFR Non  Amer   Date Value Ref Range Status   02/07/2020 31 (L) >60 mL/min/1.73 Final     Alkaline Phosphatase   Date Value Ref Range Status   02/07/2020 124 (H) 39 - 117 U/L Final   11/28/2019 133 (H) 46 - 116 U/L Final     Total Protein   Date Value Ref Range Status   02/07/2020 7.4 6.0 - 8.5 g/dL Final   11/28/2019 7.1 6.4 - 8.2 g/dL Final     ALT (SGPT)   Date Value Ref Range Status   02/07/2020 14 1 - 41 U/L Final   11/28/2019 23 16 - 63 U/L Final     AST (SGOT)   Date Value Ref Range Status   02/07/2020 15 1 - 40 U/L Final   11/28/2019 69 (H) 15 - 37 U/L Final     Total Bilirubin   Date Value Ref Range Status   02/07/2020 0.4 0.2 - 1.2 mg/dL Final   11/28/2019 0.5 0.2 - 1.0 mg/dL Final     Albumin   Date Value Ref Range Status      02/07/2020 4.60 3.50 - 5.20 g/dL Final   11/28/2019 3.8 3.4 - 5.0 g/dL Final     Globulin   Date Value Ref Range Status   02/07/2020 2.8 gm/dL Final     Lab Results   Component Value Date    WBC 8.72 02/07/2020    HGB 12.0 (L) 02/07/2020    HCT 37.9 02/07/2020    MCV 90.5 02/07/2020     02/07/2020     Lab Results   Component Value Date    NEUTROABS 7.06 (H) 02/07/2020    IRON 68 02/07/2020    TIBC 392 02/07/2020    LABIRON 17 (L) 02/07/2020    FERRITIN 177.70 02/07/2020    CICLNQMT01 697 10/22/2019    FOLATE 18.10 02/11/2019     No results found for: , LABCA2, AFPTM, HCGQUANT, , CHROMGRNA, [id number], CEA, REFLABREPO]      ASSESSMENT AND PLAN:    1. Anemia; multiple reasons; component of Iron deficiency anemia secondary to possible GI bleed and component of CKD stage III;  He received IV iron back in January 2018 due to his inability to tolerate PO iron; his iron levels improved; recently levels dropped again and he was given 2 additional doses of iron; stool occult was negative and GI referral was placed but pt refused to go. His Hgb today is stable at 12.0 with iron saturation of 17% and ferritin of 177; will recheck again 6 mos.      2.  Chronic kidney disease stage III; pt was following with nephrology at [facility]; states he does not follow with anyone at this time; GFR today is 31; will defer to his PCP.     3.  Prostate cancer status post seed implant. Being followed by different provider, not managed by our clinic.     4.  Peripheral vascular disease status post femoropopliteal bypass. Follows with cardiologist in [place].         This document has been signed by JUAN M Hernandez on February 7, 2020 3:19 PM

## 2020-02-10 RX ORDER — OMEPRAZOLE 40 MG/1
40 CAPSULE, DELAYED RELEASE ORAL EVERY MORNING
Qty: 30 CAPSULE | Refills: 11 | Status: SHIPPED | OUTPATIENT
Start: 2020-02-10 | End: 2021-01-01

## 2020-02-17 RX ORDER — FOLIC ACID 1 MG/1
1 TABLET ORAL DAILY
Qty: 30 TABLET | Refills: 3 | Status: SHIPPED | OUTPATIENT
Start: 2020-02-17 | End: 2021-01-22 | Stop reason: SDUPTHER

## 2020-02-24 ENCOUNTER — LAB (OUTPATIENT)
Dept: LAB | Facility: OTHER | Age: 85
End: 2020-02-24

## 2020-02-24 DIAGNOSIS — I10 ESSENTIAL HYPERTENSION: ICD-10-CM

## 2020-02-24 DIAGNOSIS — N18.30 CHRONIC KIDNEY DISEASE, STAGE 3 (HCC): Chronic | ICD-10-CM

## 2020-02-24 DIAGNOSIS — E53.8 B12 DEFICIENCY: ICD-10-CM

## 2020-02-24 DIAGNOSIS — D50.8 OTHER IRON DEFICIENCY ANEMIA: Chronic | ICD-10-CM

## 2020-02-24 DIAGNOSIS — E11.22 TYPE 2 DIABETES MELLITUS WITH STAGE 3 CHRONIC KIDNEY DISEASE, WITHOUT LONG-TERM CURRENT USE OF INSULIN (HCC): Chronic | ICD-10-CM

## 2020-02-24 DIAGNOSIS — E78.2 MIXED HYPERLIPIDEMIA: Chronic | ICD-10-CM

## 2020-02-24 DIAGNOSIS — N18.30 TYPE 2 DIABETES MELLITUS WITH STAGE 3 CHRONIC KIDNEY DISEASE, WITHOUT LONG-TERM CURRENT USE OF INSULIN (HCC): Chronic | ICD-10-CM

## 2020-02-24 LAB
ALBUMIN SERPL-MCNC: 4.2 G/DL (ref 3.5–5)
ALBUMIN UR-MCNC: 2.1 MG/DL
ALBUMIN/GLOB SERPL: 1.3 G/DL (ref 1.1–1.8)
ALP SERPL-CCNC: 124 U/L (ref 38–126)
ALT SERPL W P-5'-P-CCNC: 16 U/L
ANION GAP SERPL CALCULATED.3IONS-SCNC: 9 MMOL/L (ref 5–15)
ANISOCYTOSIS BLD QL: NORMAL
AST SERPL-CCNC: 23 U/L (ref 17–59)
BILIRUB SERPL-MCNC: 0.3 MG/DL (ref 0.2–1.3)
BUN BLD-MCNC: 48 MG/DL (ref 7–23)
BUN/CREAT SERPL: 19.7 (ref 7–25)
CALCIUM SPEC-SCNC: 9.3 MG/DL (ref 8.4–10.2)
CHLORIDE SERPL-SCNC: 106 MMOL/L (ref 101–112)
CHOLEST SERPL-MCNC: 248 MG/DL (ref 150–200)
CO2 SERPL-SCNC: 25 MMOL/L (ref 22–30)
CREAT BLD-MCNC: 2.44 MG/DL (ref 0.7–1.3)
CREAT UR-MCNC: 74.1 MG/DL
DEPRECATED RDW RBC AUTO: 58.2 FL (ref 37–54)
EOSINOPHIL # BLD MANUAL: 0.15 10*3/MM3 (ref 0–0.4)
EOSINOPHIL NFR BLD MANUAL: 2 % (ref 0.3–6.2)
ERYTHROCYTE [DISTWIDTH] IN BLOOD BY AUTOMATED COUNT: 17.8 % (ref 12.3–15.4)
FERRITIN SERPL-MCNC: 179 NG/ML (ref 30–400)
GFR SERPL CREATININE-BSD FRML MDRD: 25 ML/MIN/1.73 (ref 42–98)
GLOBULIN UR ELPH-MCNC: 3.3 GM/DL (ref 2.3–3.5)
GLUCOSE BLD-MCNC: 107 MG/DL (ref 70–99)
HBA1C MFR BLD: 5.9 % (ref 4.8–5.6)
HCT VFR BLD AUTO: 35.2 % (ref 37.5–51)
HDLC SERPL-MCNC: 54 MG/DL (ref 40–59)
HGB BLD-MCNC: 10.9 G/DL (ref 13–17.7)
IRON 24H UR-MRATE: 46 MCG/DL (ref 59–158)
IRON SATN MFR SERPL: 14 % (ref 20–50)
LDLC SERPL CALC-MCNC: 154 MG/DL
LDLC/HDLC SERPL: 2.86 {RATIO} (ref 0–3.55)
LYMPHOCYTES # BLD MANUAL: 1.5 10*3/MM3 (ref 0.7–3.1)
LYMPHOCYTES NFR BLD MANUAL: 20 % (ref 19.6–45.3)
LYMPHOCYTES NFR BLD MANUAL: 7 % (ref 5–12)
MCH RBC QN AUTO: 28.9 PG (ref 26.6–33)
MCHC RBC AUTO-ENTMCNC: 31 G/DL (ref 31.5–35.7)
MCV RBC AUTO: 93.4 FL (ref 79–97)
MICROALBUMIN/CREAT UR: 28.3 MG/G
MONOCYTES # BLD AUTO: 0.53 10*3/MM3 (ref 0.1–0.9)
NEUTROPHILS # BLD AUTO: 5.33 10*3/MM3 (ref 1.7–7)
NEUTROPHILS NFR BLD MANUAL: 71 % (ref 42.7–76)
PLATELET # BLD AUTO: 191 10*3/MM3 (ref 140–450)
PMV BLD AUTO: 10.1 FL (ref 6–12)
POTASSIUM BLD-SCNC: 4.7 MMOL/L (ref 3.4–5)
PROT SERPL-MCNC: 7.5 G/DL (ref 6.3–8.6)
RBC # BLD AUTO: 3.77 10*6/MM3 (ref 4.14–5.8)
SMALL PLATELETS BLD QL SMEAR: ADEQUATE
SODIUM BLD-SCNC: 140 MMOL/L (ref 137–145)
TIBC SERPL-MCNC: 338 MCG/DL (ref 298–536)
TRANSFERRIN SERPL-MCNC: 227 MG/DL (ref 200–360)
TRIGL SERPL-MCNC: 199 MG/DL
TSH SERPL DL<=0.05 MIU/L-ACNC: 2.2 UIU/ML (ref 0.27–4.2)
VIT B12 BLD-MCNC: 625 PG/ML (ref 211–946)
VLDLC SERPL-MCNC: 39.8 MG/DL
WBC MORPH BLD: NORMAL
WBC NRBC COR # BLD: 7.51 10*3/MM3 (ref 3.4–10.8)

## 2020-02-24 PROCEDURE — 82570 ASSAY OF URINE CREATININE: CPT | Performed by: INTERNAL MEDICINE

## 2020-02-24 PROCEDURE — 82043 UR ALBUMIN QUANTITATIVE: CPT | Performed by: INTERNAL MEDICINE

## 2020-02-24 PROCEDURE — 85025 COMPLETE CBC W/AUTO DIFF WBC: CPT | Performed by: INTERNAL MEDICINE

## 2020-02-24 PROCEDURE — 80061 LIPID PANEL: CPT | Performed by: INTERNAL MEDICINE

## 2020-02-24 PROCEDURE — 83036 HEMOGLOBIN GLYCOSYLATED A1C: CPT | Performed by: INTERNAL MEDICINE

## 2020-02-24 PROCEDURE — 84466 ASSAY OF TRANSFERRIN: CPT | Performed by: INTERNAL MEDICINE

## 2020-02-24 PROCEDURE — 82728 ASSAY OF FERRITIN: CPT | Performed by: INTERNAL MEDICINE

## 2020-02-24 PROCEDURE — 83540 ASSAY OF IRON: CPT | Performed by: INTERNAL MEDICINE

## 2020-02-24 PROCEDURE — 82607 VITAMIN B-12: CPT | Performed by: INTERNAL MEDICINE

## 2020-02-24 PROCEDURE — 80053 COMPREHEN METABOLIC PANEL: CPT | Performed by: INTERNAL MEDICINE

## 2020-02-24 PROCEDURE — 36415 COLL VENOUS BLD VENIPUNCTURE: CPT | Performed by: INTERNAL MEDICINE

## 2020-02-24 PROCEDURE — 84443 ASSAY THYROID STIM HORMONE: CPT | Performed by: INTERNAL MEDICINE

## 2020-03-02 ENCOUNTER — OFFICE VISIT (OUTPATIENT)
Dept: FAMILY MEDICINE CLINIC | Facility: CLINIC | Age: 85
End: 2020-03-02

## 2020-03-02 VITALS
TEMPERATURE: 97.4 F | BODY MASS INDEX: 25.09 KG/M2 | DIASTOLIC BLOOD PRESSURE: 80 MMHG | HEIGHT: 69 IN | SYSTOLIC BLOOD PRESSURE: 138 MMHG | HEART RATE: 56 BPM | WEIGHT: 169.4 LBS

## 2020-03-02 DIAGNOSIS — N18.30 TYPE 2 DIABETES MELLITUS WITH STAGE 3 CHRONIC KIDNEY DISEASE, WITHOUT LONG-TERM CURRENT USE OF INSULIN (HCC): Chronic | ICD-10-CM

## 2020-03-02 DIAGNOSIS — I73.9 PERIPHERAL VASCULAR DISEASE (HCC): Chronic | ICD-10-CM

## 2020-03-02 DIAGNOSIS — E53.8 B12 DEFICIENCY: ICD-10-CM

## 2020-03-02 DIAGNOSIS — I87.2 PERIPHERAL VENOUS INSUFFICIENCY: Chronic | ICD-10-CM

## 2020-03-02 DIAGNOSIS — E11.22 TYPE 2 DIABETES MELLITUS WITH STAGE 3 CHRONIC KIDNEY DISEASE, WITHOUT LONG-TERM CURRENT USE OF INSULIN (HCC): Chronic | ICD-10-CM

## 2020-03-02 DIAGNOSIS — D50.8 OTHER IRON DEFICIENCY ANEMIA: Chronic | ICD-10-CM

## 2020-03-02 DIAGNOSIS — I10 ESSENTIAL HYPERTENSION: Primary | Chronic | ICD-10-CM

## 2020-03-02 DIAGNOSIS — E78.2 MIXED HYPERLIPIDEMIA: Chronic | ICD-10-CM

## 2020-03-02 DIAGNOSIS — C61 MALIGNANT TUMOR OF PROSTATE (HCC): ICD-10-CM

## 2020-03-02 DIAGNOSIS — I25.10 CORONARY ARTERIOSCLEROSIS: Chronic | ICD-10-CM

## 2020-03-02 DIAGNOSIS — Z23 NEED FOR VACCINATION FOR PNEUMOCOCCUS: ICD-10-CM

## 2020-03-02 DIAGNOSIS — N18.30 CHRONIC KIDNEY DISEASE, STAGE 3 (HCC): Chronic | ICD-10-CM

## 2020-03-02 PROCEDURE — 90732 PPSV23 VACC 2 YRS+ SUBQ/IM: CPT | Performed by: INTERNAL MEDICINE

## 2020-03-02 PROCEDURE — G0009 ADMIN PNEUMOCOCCAL VACCINE: HCPCS | Performed by: INTERNAL MEDICINE

## 2020-03-02 PROCEDURE — 99214 OFFICE O/P EST MOD 30 MIN: CPT | Performed by: INTERNAL MEDICINE

## 2020-03-02 NOTE — PROGRESS NOTES
Subjective          History of Present Illness     Sy Florez is a 89 y.o.male who comes in for  4-month follow up of stage 3 CKD, CAD, PAD, chronic venous insufficiency, hypertension, high cholesterol, iron deficiency anemia and extensive peripheral vascular disease, status post femoropopliteal bypass right leg 1997 and left leg 2007.  He continues vascular risk factor modifications including Crestor and daily aspirin.  He can only tolerate Crestor once weekly due to myalgia.  Denies chest pain.  He has support stockings at home, but has not been wearing them since discharge from the hospital 11/2019 when he was treated for left pneumonia.  He reports the stockings are just too difficult to get on.  Diabetes continues to be diet controlled.       He reports increased shortness of breath and wheezing recently.  He has been having a cough productive of yellow sputum for the past 10-14 days.  He has a ProAir rescue inhaler.  He used a sample of Breo maintenance inhaler in the past with good results and would like to have a refill.  We discussed using antibiotic treatment, although, he would like to wait for now and see if symptoms improve.  He may find running a humidifier during the winter heating months helps.    Weight is up 1 pound in the past four months.  Blood pressure at goal.     Labs reveal hemoglobin trending down at 10.9, decreased from 12 three weeks prior when checked by hematology.  He denies visualizing blood in the urine or stool.  Dr. Mccord/Char Soto continues to follow iron deficiency anemia.  He is scheduled for follow up in June.     The patient's relevant past medical, surgical, and social history was reviewed in Epic.   Lab results are reviewed with the patient today.  Fasting glucose 107. A1c 5.9.  Normal liver function.  Renal function declined with creatinine 2.4 increased from 2.0 three weeks prior, although, overall trend has improved.  Total cholesterol 248.  HDL 54.  .  "Triglycerides 199.  LDL/HDL ratio 2.86.  Vitamin B-12 at goal.  Normal thyroid function.      Review of Systems   Constitutional: Negative for chills, fatigue and fever.   HENT: Negative for congestion, ear pain, postnasal drip, sinus pressure and sore throat.    Respiratory: Negative for cough, shortness of breath and wheezing.    Cardiovascular: Negative for chest pain, palpitations and leg swelling.   Gastrointestinal: Negative for abdominal pain, blood in stool, constipation, diarrhea, nausea and vomiting.   Endocrine: Negative for cold intolerance, heat intolerance, polydipsia and polyuria.   Genitourinary: Negative for dysuria, frequency, hematuria and urgency.   Skin: Negative for rash.   Neurological: Negative for syncope and weakness.        Objective     Visit Vitals  /80   Pulse 56   Temp 97.4 °F (36.3 °C) (Oral)   Ht 175.3 cm (69\")   Wt 76.8 kg (169 lb 6.4 oz)   BMI 25.02 kg/m²     Physical Exam   Constitutional: He is oriented to person, place, and time. He appears well-developed and well-nourished. No distress.   Appears younger than stated age.     HENT:   Head: Normocephalic and atraumatic.   Nose: Right sinus exhibits no maxillary sinus tenderness and no frontal sinus tenderness. Left sinus exhibits no maxillary sinus tenderness and no frontal sinus tenderness.   Mouth/Throat: Uvula is midline, oropharynx is clear and moist and mucous membranes are normal. No oral lesions. No tonsillar exudate.   Eyes: Pupils are equal, round, and reactive to light. Conjunctivae and EOM are normal.   Neck: Trachea normal. Neck supple. No JVD present. Carotid bruit is not present. No tracheal deviation present. No thyroid mass and no thyromegaly present.   Turbulent flow over carotids.    Cardiovascular: Normal rate, regular rhythm, normal heart sounds and intact distal pulses.  No extrasystoles are present. PMI is not displaced.   No murmur heard.  Pulmonary/Chest: Effort normal and breath sounds normal. No " accessory muscle usage. No respiratory distress. He has no decreased breath sounds. He has no wheezes. He has no rhonchi. He has no rales.   Chronic lung sounds.    Abdominal: Soft. Bowel sounds are normal. He exhibits no distension. There is no hepatosplenomegaly. There is no tenderness.     Vascular Status -  His right foot exhibits abnormal foot vasculature  (Very diminished pulses bilateral ankles.   ) and abnormal foot edema (1+ edema bilateral lower extremities.  ). His left foot exhibits abnormal foot vasculature  and abnormal foot edema.  Lymphadenopathy:     He has no cervical adenopathy.   Neurological: He is alert and oriented to person, place, and time. No cranial nerve deficit. Coordination normal.   Skin: Skin is warm, dry and intact. No rash noted. No cyanosis. Nails show no clubbing.   Psychiatric: He has a normal mood and affect. His speech is normal and behavior is normal. Judgment and thought content normal.   Vitals reviewed.      Assessment/Plan      After informed verbal consent, patient is given Pneumovax.  He tolerated well.     Continue the current vascular risk factor modifications, including Crestor and daily aspirin. I recommended he add OTC Co Enzyme 100 mg with each dose of Crestor to help him tolerate it more frequently.  Hopefully, he can increase to three times weekly to help lower cholesterol and decrease risk of heart attack or stroke.   Continue current blood pressure medications.      Reviewed management of chronic venous insufficiency including maintaining a goal body weight, elevating lower extremities when not ambulating, sodium restriction and compliance with wearing the compression stockings.  He may can tolerate lighter weight IVÁN stockings if the compression stockings continue to be difficulty to get on    Continue to follow with Dr. Mccord for iron deficiency anemia.  We will continue to monitor anemia as well.     Continue current diabetic management and monitoring.  He is  diet controlled.     Continue Prilosec for GERD.        Prescription is sent for Breo inhaler to use one inhalation daily.  Continue the ProAir rescue inhaler.  He declines antibiotic treatment.  I recommended running a humidifier during the winter heating months.        Continue to avoid NSAIDs and other nephrotoxic drugs.    Return in four months for follow up with fasting labs one week prior.     Scribed for Dr. Rodriguez by Johanna Reyes [facility].     Diagnoses and all orders for this visit:    Essential hypertension    Coronary arteriosclerosis    Mixed hyperlipidemia  -     LDL Cholesterol, Direct; Future    Peripheral vascular disease (CMS/HCC)    Peripheral venous insufficiency    B12 deficiency  -     Vitamin B12; Future  -     Ferritin; Future  -     Iron Profile; Future    Type 2 diabetes mellitus with stage 3 chronic kidney disease, without long-term current use of insulin (CMS/HCC)  -     CBC Auto Differential; Future  -     Comprehensive Metabolic Panel; Future  -     Hemoglobin A1c; Future    Chronic kidney disease, stage 3 (CMS/HCC)    Other iron deficiency anemia  -     Ferritin; Future  -     Iron Profile; Future    Need for vaccination for pneumococcus  -     Pneumococcal Polysaccharide Vaccine 23-Valent (PPSV23) Greater Than or Equal To 1yo Subcutaneous / IM    Malignant tumor of prostate (CMS/[facility])  -     PSA DIAGNOSTIC; Future    Other orders  -     Cancel: Fluad Tri 65yr+  -     Fluticasone Furoate-Vilanterol (BREO ELLIPTA) 100-25 MCG/INH inhaler; Inhale 1 puff Daily.        Lab on 02/24/2020   Component Date Value Ref Range Status   • WBC 02/24/2020 7.51  3.40 - 10.80 10*3/mm3 Final   • RBC 02/24/2020 3.77* 4.14 - 5.80 10*6/mm3 Final   • Hemoglobin 02/24/2020 10.9* 13.0 - 17.7 g/dL Final   • Hematocrit 02/24/2020 35.2* 37.5 - 51.0 % Final   • MCV 02/24/2020 93.4  79.0 - 97.0 fL Final   • MCH 02/24/2020 28.9  26.6 - 33.0 pg Final   • MCHC 02/24/2020 31.0* 31.5 - 35.7 g/dL Final   • RDW 02/24/2020  17.8* 12.3 - 15.4 % Final   • RDW-SD 02/24/2020 58.2* 37.0 - 54.0 fl Final   • MPV 02/24/2020 10.1  6.0 - 12.0 fL Final   • Platelets 02/24/2020 191  140 - 450 10*3/mm3 Final   • Glucose 02/24/2020 107* 70 - 99 mg/dL Final   • BUN 02/24/2020 48* 7 - 23 mg/dL Final   • Creatinine 02/24/2020 2.44* 0.70 - 1.30 mg/dL Final   • Sodium 02/24/2020 140  137 - 145 mmol/L Final   • Potassium 02/24/2020 4.7  3.4 - 5.0 mmol/L Final   • Chloride 02/24/2020 106  101 - 112 mmol/L Final   • CO2 02/24/2020 25.0  22.0 - 30.0 mmol/L Final   • Calcium 02/24/2020 9.3  8.4 - 10.2 mg/dL Final   • Total Protein 02/24/2020 7.5  6.3 - 8.6 g/dL Final   • Albumin 02/24/2020 4.20  3.50 - 5.00 g/dL Final   • ALT (SGPT) 02/24/2020 16  <=50 U/L Final   • AST (SGOT) 02/24/2020 23  17 - 59 U/L Final   • Alkaline Phosphatase 02/24/2020 124  38 - 126 U/L Final   • Total Bilirubin 02/24/2020 0.3  0.2 - 1.3 mg/dL Final   • eGFR Non African Amer 02/24/2020 25* 42 - 98 mL/min/1.73 Final   • Globulin 02/24/2020 3.3  2.3 - 3.5 gm/dL Final   • A/G Ratio 02/24/2020 1.3  1.1 - 1.8 g/dL Final   • BUN/Creatinine Ratio 02/24/2020 19.7  7.0 - 25.0 Final   • Anion Gap 02/24/2020 9.0  5.0 - 15.0 mmol/L Final   • Hemoglobin A1C 02/24/2020 5.90* 4.80 - 5.60 % Final   • Total Cholesterol 02/24/2020 248* 150 - 200 mg/dL Final   • Triglycerides 02/24/2020 199* <=150 mg/dL Final   • HDL Cholesterol 02/24/2020 54  40 - 59 mg/dL Final   • LDL Cholesterol  02/24/2020 154* <=100 mg/dL Final   • VLDL Cholesterol 02/24/2020 39.8  mg/dL Final   • LDL/HDL Ratio 02/24/2020 2.86  0.00 - 3.55 Final   • Ferritin 02/24/2020 179.00  30.00 - 400.00 ng/mL Final   • Iron 02/24/2020 46* 59 - 158 mcg/dL Final   • Iron Saturation 02/24/2020 14* 20 - 50 % Final   • Transferrin 02/24/2020 227  200 - 360 mg/dL Final   • TIBC 02/24/2020 338  298 - 536 mcg/dL Final   • Microalbumin/Creatinine Ratio 02/24/2020 28.3  mg/g Final   • Creatinine, Urine 02/24/2020 74.1  mg/dL Final   • Microalbumin,  Urine 02/24/2020 2.1  mg/dL Final   • Vitamin B-12 02/24/2020 625  211 - 946 pg/mL Final   • TSH 02/24/2020 2.200  0.270 - 4.200 uIU/mL Final   • Neutrophil % 02/24/2020 71.0  42.7 - 76.0 % Final   • Lymphocyte % 02/24/2020 20.0  19.6 - 45.3 % Final   • Monocyte % 02/24/2020 7.0  5.0 - 12.0 % Final   • Eosinophil % 02/24/2020 2.0  0.3 - 6.2 % Final   • Neutrophils Absolute 02/24/2020 5.33  1.70 - 7.00 10*3/mm3 Final   • Lymphocytes Absolute 02/24/2020 1.50  0.70 - 3.10 10*3/mm3 Final   • Monocytes Absolute 02/24/2020 0.53  0.10 - 0.90 10*3/mm3 Final   • Eosinophils Absolute 02/24/2020 0.15  0.00 - 0.40 10*3/mm3 Final   • Anisocytosis 02/24/2020 Slight/1+  None Seen Final   • WBC Morphology 02/24/2020 Normal  Normal Final   • Platelet Estimate 02/24/2020 Adequate  Normal Final   ]

## 2020-04-08 DIAGNOSIS — I73.9 CLAUDICATION (HCC): Primary | ICD-10-CM

## 2020-04-08 RX ORDER — GABAPENTIN 600 MG/1
TABLET ORAL
Qty: 60 TABLET | Refills: 5 | Status: SHIPPED | OUTPATIENT
Start: 2020-04-08 | End: 2020-11-02

## 2020-05-05 ENCOUNTER — EPISODE CHANGES (OUTPATIENT)
Dept: CASE MANAGEMENT | Facility: OTHER | Age: 85
End: 2020-05-05

## 2020-05-06 ENCOUNTER — PATIENT OUTREACH (OUTPATIENT)
Dept: CASE MANAGEMENT | Facility: OTHER | Age: 85
End: 2020-05-06

## 2020-05-06 NOTE — OUTREACH NOTE
Care Plan Note      Responses   Annual Wellness Visit:   Patient Will Schedule [has scheduled appointment on 7-7-2020]   Care Gaps Addressed  Colon Cancer Screening, Diabetic A1C, Diabetic Eye Exam, Flu Shot, Pneumonia Vaccine   Colon Cancer Screening Type  Exempt   HbA1c Status  Up to Date-within defined limits   HbA1c Completion at [facility] or Other  [facility]   HbA1c Comments  5.90 on 2-   Diabetic Eye Exam Status  Up to Date   Diabetic Eye Exam Completion at [facility] or Other  Other   Diabetic Eye Exam Comments  completed 2-4-2020   Flu Shot Status  Up to Date   Flu Shot Comments  completed 10-29-19   Pneumonia Vaccine Status  Up to Date   Pneumonia Vaccine Completion at [facility] or Other  [facility]   Pneumonia Vaccine Comments  completed PPSV 23 3-2-2020   Other Patient Education/Resources   -- [provided ACM contact information]   Does patient have depression diagnosis?  No   Advanced Directives:  Patient Has   Medication Adherence  Medications understood        ACM proactive outreach for a patient at risk of developing complications should an exposure to COVID-19 occur. Patient reports he is doing well at home. He has a  that assists as needed. He is independent of ADL's and does not require an assist device for ambulation;no falls reported. He has his medications and food in the home. He is aware of virtual visits through his active MyChart or telephonic outreach if needed. He maintains close follow up with providers. He denies needs today. Care gaps listed.    Follow Up Outreach Due: as needed    Alona Martinez RN  Ambulatory     5/6/2020, 14:49

## 2020-05-06 NOTE — OUTREACH NOTE
Care Coordination Assessment    Documented/Reviewed By:  Alona Martinez RN Date/time:  5/6/2020  2:37 PM   Assessment completed with:  patient  Living arrangement:  children  Support system:  children  Type of residence:  private residence  Home care services:  No  Equipment used at home:  none  Communication device:  Yes  Bed or wheelchair confined:  No  Inadequate nutrition:  No  Medication adherence problem:  No  Experiencing side effects from current medications:  No  History of fall(s) in last 6 months:  No  Difficulty keeping appointments:  No  Family aware of the patient's advance care planning wishes:  Yes (Comment: has advance directive)

## 2020-05-11 RX ORDER — RAMIPRIL 5 MG/1
CAPSULE ORAL
Qty: 30 CAPSULE | Refills: 5 | Status: SHIPPED | OUTPATIENT
Start: 2020-05-11 | End: 2020-06-08

## 2020-05-12 ENCOUNTER — EPISODE CHANGES (OUTPATIENT)
Dept: CASE MANAGEMENT | Facility: OTHER | Age: 85
End: 2020-05-12

## 2020-06-08 RX ORDER — RAMIPRIL 5 MG/1
CAPSULE ORAL
Qty: 30 CAPSULE | Refills: 5 | Status: SHIPPED | OUTPATIENT
Start: 2020-06-08 | End: 2020-11-30

## 2020-06-24 ENCOUNTER — OFFICE VISIT (OUTPATIENT)
Dept: ONCOLOGY | Facility: CLINIC | Age: 85
End: 2020-06-24

## 2020-06-24 ENCOUNTER — LAB (OUTPATIENT)
Dept: ONCOLOGY | Facility: HOSPITAL | Age: 85
End: 2020-06-24

## 2020-06-24 VITALS
WEIGHT: 166.9 LBS | BODY MASS INDEX: 24.72 KG/M2 | HEIGHT: 69 IN | HEART RATE: 51 BPM | RESPIRATION RATE: 18 BRPM | SYSTOLIC BLOOD PRESSURE: 151 MMHG | TEMPERATURE: 97.5 F | DIASTOLIC BLOOD PRESSURE: 70 MMHG

## 2020-06-24 DIAGNOSIS — D50.8 OTHER IRON DEFICIENCY ANEMIA: ICD-10-CM

## 2020-06-24 DIAGNOSIS — E53.8 B12 DEFICIENCY: Primary | ICD-10-CM

## 2020-06-24 LAB
ALBUMIN SERPL-MCNC: 4.4 G/DL (ref 3.5–5.2)
ALBUMIN/GLOB SERPL: 1.8 G/DL
ALP SERPL-CCNC: 112 U/L (ref 39–117)
ALT SERPL W P-5'-P-CCNC: 11 U/L (ref 1–41)
ANION GAP SERPL CALCULATED.3IONS-SCNC: 13 MMOL/L (ref 5–15)
AST SERPL-CCNC: 15 U/L (ref 1–40)
BASOPHILS # BLD AUTO: 0.05 10*3/MM3 (ref 0–0.2)
BASOPHILS NFR BLD AUTO: 0.5 % (ref 0–1.5)
BILIRUB SERPL-MCNC: 0.3 MG/DL (ref 0.2–1.2)
BUN BLD-MCNC: 54 MG/DL (ref 8–23)
BUN/CREAT SERPL: 26.1 (ref 7–25)
CALCIUM SPEC-SCNC: 9.4 MG/DL (ref 8.2–9.6)
CHLORIDE SERPL-SCNC: 104 MMOL/L (ref 98–107)
CO2 SERPL-SCNC: 23 MMOL/L (ref 22–29)
CREAT BLD-MCNC: 2.07 MG/DL (ref 0.76–1.27)
DEPRECATED RDW RBC AUTO: 50.6 FL (ref 37–54)
EOSINOPHIL # BLD AUTO: 0.02 10*3/MM3 (ref 0–0.4)
EOSINOPHIL NFR BLD AUTO: 0.2 % (ref 0.3–6.2)
ERYTHROCYTE [DISTWIDTH] IN BLOOD BY AUTOMATED COUNT: 15.2 % (ref 12.3–15.4)
GFR SERPL CREATININE-BSD FRML MDRD: 30 ML/MIN/1.73
GLOBULIN UR ELPH-MCNC: 2.5 GM/DL
GLUCOSE BLD-MCNC: 136 MG/DL (ref 65–99)
HCT VFR BLD AUTO: 37.3 % (ref 37.5–51)
HGB BLD-MCNC: 12.5 G/DL (ref 13–17.7)
IMM GRANULOCYTES # BLD AUTO: 0.09 10*3/MM3 (ref 0–0.05)
IMM GRANULOCYTES NFR BLD AUTO: 1 % (ref 0–0.5)
LYMPHOCYTES # BLD AUTO: 0.91 10*3/MM3 (ref 0.7–3.1)
LYMPHOCYTES NFR BLD AUTO: 9.7 % (ref 19.6–45.3)
MCH RBC QN AUTO: 30.6 PG (ref 26.6–33)
MCHC RBC AUTO-ENTMCNC: 33.5 G/DL (ref 31.5–35.7)
MCV RBC AUTO: 91.4 FL (ref 79–97)
MONOCYTES # BLD AUTO: 0.44 10*3/MM3 (ref 0.1–0.9)
MONOCYTES NFR BLD AUTO: 4.7 % (ref 5–12)
NEUTROPHILS # BLD AUTO: 7.91 10*3/MM3 (ref 1.7–7)
NEUTROPHILS NFR BLD AUTO: 83.9 % (ref 42.7–76)
NRBC BLD AUTO-RTO: 0 /100 WBC (ref 0–0.2)
PLATELET # BLD AUTO: 199 10*3/MM3 (ref 140–450)
PMV BLD AUTO: 10.3 FL (ref 6–12)
POTASSIUM BLD-SCNC: 4.8 MMOL/L (ref 3.5–5.2)
PROT SERPL-MCNC: 6.9 G/DL (ref 6–8.5)
RBC # BLD AUTO: 4.08 10*6/MM3 (ref 4.14–5.8)
SODIUM BLD-SCNC: 140 MMOL/L (ref 136–145)
WBC NRBC COR # BLD: 9.42 10*3/MM3 (ref 3.4–10.8)

## 2020-06-24 PROCEDURE — 80053 COMPREHEN METABOLIC PANEL: CPT | Performed by: NURSE PRACTITIONER

## 2020-06-24 PROCEDURE — 99213 OFFICE O/P EST LOW 20 MIN: CPT | Performed by: NURSE PRACTITIONER

## 2020-06-24 PROCEDURE — G0463 HOSPITAL OUTPT CLINIC VISIT: HCPCS | Performed by: NURSE PRACTITIONER

## 2020-06-24 PROCEDURE — 85025 COMPLETE CBC W/AUTO DIFF WBC: CPT | Performed by: NURSE PRACTITIONER

## 2020-06-24 NOTE — PROGRESS NOTES
DATE OF VISIT: 6/24/2020    REASON FOR VISIT:  Follow-up for iron deficiency anemia    HISTORY OF PRESENT ILLNESS:  89-year-old male with a past medical history significant for history of peripheral vascular disease status post femoropopliteal bypass, prostate cancer status post seed in 2001, history of coronary artery disease status post CABG was initially seen in consultation on December 20, 2017 for evaluation of iron deficiency anemia and not able to tolerate borderline.  Subsequently patient received 2 dose of intravenous Feraheme last of which was on January 11, 2018.   Last dose of IV iron was in 2018.  He is taking his folic acid daily and getting B-12 injections thru his PCP.  He states he is anticipating hip replacement surgery in near future. He is awaiting cardiac clearance.  PAST MEDICAL HISTORY:    Past Medical History:   Diagnosis Date   • Actinic keratosis    • Acute prostatitis     h/o 2014    • Anemia      New problem noted with preoperative labs, 10/2015.. Anemia workup initiated, 10/2015      • Arthritis      Possible inflammatory arthritis bilateral knees   • Bursitis of hip     right greater trochanteric bursitis, spring 2014   • Cellulitis of lower limb     mild bilateral lower extremities   • Chronic kidney disease, stage 3 (CMS/HCC)     - cr =1.8-2.0, gradually worsening   • Claudication (CMS/HCC)     dr. you s/p numerous revascularization. medical management   • Coronary arteriosclerosis     dr. perez   • Degenerative joint disease involving multiple joints    • Disorder of lumbar spine     Lumbar laminectomy at [facility], 11/4/2015      • Dyslipidemia     Resumed statin therapy 1/2015. Lipitor   • Edema of lower extremity      Etiology undetermined. Chronic venous insufficiency is playing a role   • Essential hypertension    • Eustachian tube disorder    • Gastroesophageal reflux disease    • Hyperlipidemia     Resumed extended interval Lipitor, 7/2015   • Idiopathic  peripheral neuropathy     Gradually increasing Neurontin. Referred to Dr. Vergara, neurologist, 2015      • IFG (impaired fasting glucose)    • Iron deficiency anemia      New problem noted with preoperative labs, 10/2015.. Anemia workup initiated, 10/2015      • Leg cramp     nocturnal leg cramps   • Malaise and fatigue    • Malignant tumor of prostate (CMS/HCC)     2001 s/p brachytherapy   • Peripheral vascular disease (CMS/HCC)     PAD/claudication. Dr. Brennan      • Peripheral venous insufficiency     Chronic venous insufficiency bilateral lower extremities, left worse than right      • Pneumonia     H/O   • Pruritic rash     Autoimmune. Responds to methotrexate. Dr. Oliveros, dermatology. The patient stopped the methotrexate and declines resumption. Started low-dose prednisone, 2016      • Type 2 diabetes mellitus (CMS/HCC)     Newly diagnosed, 2015. Started Amaryl, 2015. Metformin and pioglitizone contraindicated   • Type 2 diabetes mellitus with chronic kidney disease, without long-term current use of insulin (CMS/HCC)     Newly diagnosed, 2015. Started Amaryl, 2015. Metformin and pioglitizone contraindicated   • UTI (urinary tract infection)     H/O,site not specified-possible       SOCIAL HISTORY:    Social History     Tobacco Use   • Smoking status: Former Smoker     Packs/day: 1.00     Years: 35.00     Pack years: 35.00     Last attempt to quit: 1980     Years since quittin.5   • Smokeless tobacco: Never Used   Substance Use Topics   • Alcohol use: No   • Drug use: No       Surgical History :  Past Surgical History:   Procedure Laterality Date   • CORONARY ARTERY BYPASS GRAFT      vein,two  2006   • ENDOSCOPY AND COLONOSCOPY      2011 declines   • OTHER SURGICAL HISTORY      femoral-popliteal bypass graft - right leg /left leg    • OTHER SURGICAL HISTORY      laser surgery of prostate, - radiation implant;  Last Performed: 2001   • OTHER SURGICAL HISTORY      low back disk  "surgery , - Lumbar laminectomy with L4/L5 mechanical fusion;  Last Performed: 11/2015   • OTHER SURGICAL HISTORY      remove impacted cerumen 4/06/16       ALLERGIES:    Allergies   Allergen Reactions   • Bacitracin Rash   • Formaldehyde Rash   • Framycetin Rash     All Fragrances   • Neomycin Rash   • Nickel Rash       REVIEW OF SYSTEMS:      CONSTITUTIONAL:  No fever, chills, or night sweats.     HEENT:  No epistaxis, mouth sores, or difficulty swallowing.    RESPIRATORY:  No new shortness of breath or cough at present.    CARDIOVASCULAR:  No chest pain or palpitations.    GASTROINTESTINAL:  No abdominal pain, nausea, vomiting, or blood in the stool.    GENITOURINARY:  No dysuria or hematuria.    MUSCULOSKELETAL:  No any new back pain or arthralgias.     NEUROLOGICAL:  No tingling or numbness. No new headache or dizziness.     LYMPHATICS:  Denies any abnormal swollen and anywhere in the body.    SKIN:  Denies any new skin rash.   Review of systems remains unchanged from previous visit.  PHYSICAL EXAMINATION:      VITAL SIGNS:  /70   Pulse 51   Temp 97.5 °F (36.4 °C) (Temporal)   Resp 18   Ht 175.3 cm (69.02\")   Wt 75.7 kg (166 lb 14.4 oz)   BMI 24.64 kg/m²     GENERAL:  Not in any distress.    HEENT:  Normocephalic, Atraumatic.Mild Conjunctival pallor. No icterus.  No Facial Asymmetry noted.    NECK:  No adenopathy. No JVD.    RESPIRATORY:  Fair air entry bilateral. No rhonchi or wheezing.    CARDIOVASCULAR:  S1, S2. Regular rate and rhythm. No murmur or gallop appreciated.    ABDOMEN:  Soft, obese, nontender. Bowel sounds present in all four quadrants.  No organomegaly appreciated.    EXTREMITIES:  No edema.No Calf Tenderness.    NEUROLOGIC:  Alert, awake and oriented ×3.   SKIN : chronic skin changes from sun exposure. No skin lesions identified  DIAGNOSTIC DATA:    Glucose   Date Value Ref Range Status   06/24/2020 136 (H) 65 - 99 mg/dL Final     Sodium   Date Value Ref Range Status   06/24/2020 140 " 136 - 145 mmol/L Final   11/28/2019 141 136 - 145 mmol/L Final     Potassium   Date Value Ref Range Status   06/24/2020 4.8 3.5 - 5.2 mmol/L Final   11/28/2019 4.7 3.5 - 5.1 mmol/L Final     CO2   Date Value Ref Range Status   06/24/2020 23.0 22.0 - 29.0 mmol/L Final     Chloride   Date Value Ref Range Status   06/24/2020 104 98 - 107 mmol/L Final   11/28/2019 105 98 - 107 mmol/L Final     Anion Gap   Date Value Ref Range Status   06/24/2020 13.0 5.0 - 15.0 mmol/L Final     Creatinine   Date Value Ref Range Status   06/24/2020 2.07 (H) 0.76 - 1.27 mg/dL Final   11/28/2019 2.6 (H) 0.7 - 1.3 mg/dL Final     Comment:     **The MDRD GFR formula is valid only for ages 18-70.    GFR will not be reported for individuals aging <18 or >70.     BUN   Date Value Ref Range Status   06/24/2020 54 (H) 8 - 23 mg/dL Final   11/28/2019 51 (H) 7 - 18 mg/dL Final     BUN/Creatinine Ratio   Date Value Ref Range Status   06/24/2020 26.1 (H) 7.0 - 25.0 Final     Calcium   Date Value Ref Range Status   06/24/2020 9.4 8.2 - 9.6 mg/dL Final   11/28/2019 9.2 8.5 - 10.1 mg/dL Final     eGFR Non  Amer   Date Value Ref Range Status   06/24/2020 30 (L) >60 mL/min/1.73 Final     Alkaline Phosphatase   Date Value Ref Range Status   06/24/2020 112 39 - 117 U/L Final   11/28/2019 133 (H) 46 - 116 U/L Final     Total Protein   Date Value Ref Range Status   06/24/2020 6.9 6.0 - 8.5 g/dL Final   11/28/2019 7.1 6.4 - 8.2 g/dL Final     ALT (SGPT)   Date Value Ref Range Status   06/24/2020 11 1 - 41 U/L Final   11/28/2019 23 16 - 63 U/L Final     AST (SGOT)   Date Value Ref Range Status   06/24/2020 15 1 - 40 U/L Final   11/28/2019 69 (H) 15 - 37 U/L Final     Total Bilirubin   Date Value Ref Range Status   06/24/2020 0.3 0.2 - 1.2 mg/dL Final   11/28/2019 0.5 0.2 - 1.0 mg/dL Final     Albumin   Date Value Ref Range Status   06/24/2020 4.40 3.50 - 5.20 g/dL Final   11/28/2019 3.8 3.4 - 5.0 g/dL Final     Globulin   Date Value Ref Range Status    06/24/2020 2.5 gm/dL Final     Lab Results   Component Value Date    WBC 9.42 06/24/2020    HGB 12.5 (L) 06/24/2020    HCT 37.3 (L) 06/24/2020    MCV 91.4 06/24/2020     06/24/2020     Lab Results   Component Value Date    NEUTROABS 7.91 (H) 06/24/2020    IRON 46 (L) 02/24/2020    TIBC 338 02/24/2020    LABIRON 14 (L) 02/24/2020    FERRITIN 179.00 02/24/2020    [id number] 625 02/24/2020    FOLATE 15.60 02/07/2020     No results found for: , LABCA2, AFPTM, HCGQUANT, , CHROMGRNA, [id number], CEA, REFLABREPO]        ASSESSMENT AND PLAN:       1. Anemia; multiple reasons; component of Iron deficiency anemia secondary to possible GI bleed and component of CKD stage III;  He received IV iron back in January 2018 due to his inability to tolerate PO iron.  -Hgb today is 12.5 with stable iron studies. Will plan to recheck again in 6 mos with repeat CBC and iron studies.  We have discussed referral to GI in past and he has refused to go.     2.  Chronic kidney disease stage III; pt was following with nephrology at [facility]; states he does not follow with anyone at this time; GFR today is 30; will defer to his PCP.     3.  Prostate cancer status post seed implant. Being followed by different provider, not managed by our clinic.    4. Hip pain, right sided; he is anticipating hip replacement surgery soon.  Sy Florez reports a pain score of 4.  Given his pain assessment as noted, treatment options were discussed and the following options were decided upon as a follow-up plan to address the patient's pain: continuation of current treatment plan for pain.    Patient screened positive for depression based on a PHQ-9 score of 0 on 6/24/2020. Follow-up recommendations include: no follow-up needed.    This document has been signed by JUAN M Hernandez on June 24, 2020 13:37

## 2020-06-29 RX ORDER — PREDNISONE 1 MG/1
TABLET ORAL
Qty: 60 TABLET | Refills: 11 | Status: SHIPPED | OUTPATIENT
Start: 2020-06-29 | End: 2021-01-08 | Stop reason: SDUPTHER

## 2020-06-30 ENCOUNTER — LAB (OUTPATIENT)
Dept: LAB | Facility: OTHER | Age: 85
End: 2020-06-30

## 2020-06-30 DIAGNOSIS — E78.2 MIXED HYPERLIPIDEMIA: Chronic | ICD-10-CM

## 2020-06-30 DIAGNOSIS — D50.8 OTHER IRON DEFICIENCY ANEMIA: Chronic | ICD-10-CM

## 2020-06-30 DIAGNOSIS — E11.22 TYPE 2 DIABETES MELLITUS WITH STAGE 3 CHRONIC KIDNEY DISEASE, WITHOUT LONG-TERM CURRENT USE OF INSULIN (HCC): ICD-10-CM

## 2020-06-30 DIAGNOSIS — N18.30 TYPE 2 DIABETES MELLITUS WITH STAGE 3 CHRONIC KIDNEY DISEASE, WITHOUT LONG-TERM CURRENT USE OF INSULIN (HCC): ICD-10-CM

## 2020-06-30 DIAGNOSIS — E53.8 B12 DEFICIENCY: ICD-10-CM

## 2020-06-30 DIAGNOSIS — C61 MALIGNANT TUMOR OF PROSTATE (HCC): ICD-10-CM

## 2020-06-30 LAB
ALBUMIN SERPL-MCNC: 4.3 G/DL (ref 3.5–5)
ALBUMIN/GLOB SERPL: 1.4 G/DL (ref 1.1–1.8)
ALP SERPL-CCNC: 125 U/L (ref 38–126)
ALT SERPL W P-5'-P-CCNC: 13 U/L
ANION GAP SERPL CALCULATED.3IONS-SCNC: 8 MMOL/L (ref 5–15)
ANISOCYTOSIS BLD QL: NORMAL
AST SERPL-CCNC: 22 U/L (ref 17–59)
BASOPHILS # BLD MANUAL: 0.09 10*3/MM3 (ref 0–0.2)
BASOPHILS NFR BLD AUTO: 1 % (ref 0–1.5)
BILIRUB SERPL-MCNC: 0.5 MG/DL (ref 0.2–1.3)
BUN SERPL-MCNC: 46 MG/DL (ref 7–23)
BUN/CREAT SERPL: 21.3 (ref 7–25)
CALCIUM SPEC-SCNC: 9.8 MG/DL (ref 8.4–10.2)
CHLORIDE SERPL-SCNC: 104 MMOL/L (ref 101–112)
CO2 SERPL-SCNC: 27 MMOL/L (ref 22–30)
CREAT SERPL-MCNC: 2.16 MG/DL (ref 0.7–1.3)
DEPRECATED RDW RBC AUTO: 51.9 FL (ref 37–54)
ELLIPTOCYTES BLD QL SMEAR: NORMAL
EOSINOPHIL # BLD MANUAL: 0.17 10*3/MM3 (ref 0–0.4)
EOSINOPHIL NFR BLD MANUAL: 2 % (ref 0.3–6.2)
ERYTHROCYTE [DISTWIDTH] IN BLOOD BY AUTOMATED COUNT: 15.3 % (ref 12.3–15.4)
GFR SERPL CREATININE-BSD FRML MDRD: 29 ML/MIN/1.73 (ref 42–98)
GLOBULIN UR ELPH-MCNC: 3 GM/DL (ref 2.3–3.5)
GLUCOSE SERPL-MCNC: 106 MG/DL (ref 70–99)
HBA1C MFR BLD: 5.87 % (ref 4.8–5.6)
HCT VFR BLD AUTO: 38.7 % (ref 37.5–51)
HGB BLD-MCNC: 12.5 G/DL (ref 13–17.7)
LARGE PLATELETS: NORMAL
LYMPHOCYTES # BLD MANUAL: 2.51 10*3/MM3 (ref 0.7–3.1)
LYMPHOCYTES NFR BLD MANUAL: 29 % (ref 19.6–45.3)
LYMPHOCYTES NFR BLD MANUAL: 8 % (ref 5–12)
MACROCYTES BLD QL SMEAR: NORMAL
MCH RBC QN AUTO: 30.6 PG (ref 26.6–33)
MCHC RBC AUTO-ENTMCNC: 32.3 G/DL (ref 31.5–35.7)
MCV RBC AUTO: 94.9 FL (ref 79–97)
MONOCYTES # BLD AUTO: 0.69 10*3/MM3 (ref 0.1–0.9)
NEUTROPHILS # BLD AUTO: 5.12 10*3/MM3 (ref 1.7–7)
NEUTROPHILS NFR BLD MANUAL: 59 % (ref 42.7–76)
PLATELET # BLD AUTO: 186 10*3/MM3 (ref 140–450)
PMV BLD AUTO: 11 FL (ref 6–12)
POTASSIUM SERPL-SCNC: 4.7 MMOL/L (ref 3.4–5)
PROT SERPL-MCNC: 7.3 G/DL (ref 6.3–8.6)
RBC # BLD AUTO: 4.08 10*6/MM3 (ref 4.14–5.8)
SMALL PLATELETS BLD QL SMEAR: ADEQUATE
SODIUM SERPL-SCNC: 139 MMOL/L (ref 137–145)
VARIANT LYMPHS NFR BLD MANUAL: 1 % (ref 0–5)
WBC # BLD AUTO: 8.67 10*3/MM3 (ref 3.4–10.8)
WBC MORPH BLD: NORMAL

## 2020-06-30 PROCEDURE — 84153 ASSAY OF PSA TOTAL: CPT | Performed by: INTERNAL MEDICINE

## 2020-06-30 PROCEDURE — 82728 ASSAY OF FERRITIN: CPT | Performed by: INTERNAL MEDICINE

## 2020-06-30 PROCEDURE — 36415 COLL VENOUS BLD VENIPUNCTURE: CPT | Performed by: INTERNAL MEDICINE

## 2020-06-30 PROCEDURE — 82607 VITAMIN B-12: CPT | Performed by: INTERNAL MEDICINE

## 2020-06-30 PROCEDURE — 84466 ASSAY OF TRANSFERRIN: CPT | Performed by: INTERNAL MEDICINE

## 2020-06-30 PROCEDURE — 83036 HEMOGLOBIN GLYCOSYLATED A1C: CPT | Performed by: INTERNAL MEDICINE

## 2020-06-30 PROCEDURE — 80053 COMPREHEN METABOLIC PANEL: CPT | Performed by: INTERNAL MEDICINE

## 2020-06-30 PROCEDURE — 83540 ASSAY OF IRON: CPT | Performed by: INTERNAL MEDICINE

## 2020-06-30 PROCEDURE — 83721 ASSAY OF BLOOD LIPOPROTEIN: CPT | Performed by: INTERNAL MEDICINE

## 2020-06-30 PROCEDURE — 85025 COMPLETE CBC W/AUTO DIFF WBC: CPT | Performed by: INTERNAL MEDICINE

## 2020-07-01 LAB
ARTICHOKE IGE QN: 114 MG/DL (ref 0–100)
FERRITIN SERPL-MCNC: 170 NG/ML (ref 30–400)
IRON 24H UR-MRATE: 73 MCG/DL (ref 59–158)
IRON SATN MFR SERPL: 20 % (ref 20–50)
PSA SERPL-MCNC: 0.08 NG/ML (ref 0–4)
TIBC SERPL-MCNC: 364 MCG/DL (ref 298–536)
TRANSFERRIN SERPL-MCNC: 244 MG/DL (ref 200–360)
VIT B12 BLD-MCNC: 455 PG/ML (ref 211–946)

## 2020-07-07 ENCOUNTER — OFFICE VISIT (OUTPATIENT)
Dept: FAMILY MEDICINE CLINIC | Facility: CLINIC | Age: 85
End: 2020-07-07

## 2020-07-07 VITALS
HEART RATE: 52 BPM | DIASTOLIC BLOOD PRESSURE: 60 MMHG | WEIGHT: 168.2 LBS | HEIGHT: 69 IN | SYSTOLIC BLOOD PRESSURE: 130 MMHG | BODY MASS INDEX: 24.91 KG/M2 | TEMPERATURE: 96.9 F

## 2020-07-07 DIAGNOSIS — N18.4 CHRONIC KIDNEY DISEASE, STAGE IV (SEVERE) (HCC): Chronic | ICD-10-CM

## 2020-07-07 DIAGNOSIS — I25.10 CORONARY ARTERIOSCLEROSIS: Chronic | ICD-10-CM

## 2020-07-07 DIAGNOSIS — E11.22 TYPE 2 DIABETES MELLITUS WITH STAGE 4 CHRONIC KIDNEY DISEASE, WITHOUT LONG-TERM CURRENT USE OF INSULIN (HCC): Chronic | ICD-10-CM

## 2020-07-07 DIAGNOSIS — I10 ESSENTIAL HYPERTENSION: Primary | Chronic | ICD-10-CM

## 2020-07-07 DIAGNOSIS — D50.8 OTHER IRON DEFICIENCY ANEMIA: Chronic | ICD-10-CM

## 2020-07-07 DIAGNOSIS — E53.8 B12 DEFICIENCY: ICD-10-CM

## 2020-07-07 DIAGNOSIS — N18.4 TYPE 2 DIABETES MELLITUS WITH STAGE 4 CHRONIC KIDNEY DISEASE, WITHOUT LONG-TERM CURRENT USE OF INSULIN (HCC): Chronic | ICD-10-CM

## 2020-07-07 DIAGNOSIS — I73.9 CLAUDICATION (HCC): Chronic | ICD-10-CM

## 2020-07-07 DIAGNOSIS — E78.2 MIXED HYPERLIPIDEMIA: Chronic | ICD-10-CM

## 2020-07-07 DIAGNOSIS — I73.9 PERIPHERAL VASCULAR DISEASE (HCC): Chronic | ICD-10-CM

## 2020-07-07 PROCEDURE — 96372 THER/PROPH/DIAG INJ SC/IM: CPT | Performed by: INTERNAL MEDICINE

## 2020-07-07 PROCEDURE — 99214 OFFICE O/P EST MOD 30 MIN: CPT | Performed by: INTERNAL MEDICINE

## 2020-07-07 RX ADMIN — CYANOCOBALAMIN 1000 MCG: 1000 INJECTION, SOLUTION INTRAMUSCULAR; SUBCUTANEOUS at 13:28

## 2020-07-07 NOTE — PROGRESS NOTES
Subjective        History of Present Illness     Sy Florez is a 89 y.o.male who comes in for  4-month follow up of stage 3, now stage 4 CKD, CAD, PAD, chronic venous insufficiency, hypertension, high cholesterol, iron deficiency anemia and extensive peripheral vascular disease, status post femoropopliteal bypass right leg 1997 and left leg 2007.  He continues vascular risk factor modifications including daily aspirin and Crestor, which he could only tolerate once weekly due to myalgias. However with his last visit, I recommended trying Co Enzyme Q 10 to see if he could increase frequency of statin.  Although, he has noticed significant improvement, he has been able to increase to twice weekly dosing of Crestor, which has resulted in improved , down from 154.      He continues to follow with Dr. Mccord for iron deficiency anemia.  B-12 has drifted down, for which patient is given B-12 injection today.  Hemoglobin is improved substantially.    Patient has extensive osteoarthritis of right hip, and Dr. Vickers requests preoperative risk evaluation for total right hip arthroplasty by Dr. Vickers.  He reports hip pain has gotten to a point, ambulation is becoming increasingly difficult.  He is not ambulating with a cane or walker.  His multiple risk factors including stage 4 CKD with most recent GFR 25, coronary artery disease, extensive peripheral vascular disease with claudication, diet-controlled type 2 diabetes and advanced age of 89 will make his surgical risk much higher than average.  Dr. 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  Please see my detailed note below in assessment/plan.  He is scheduled for nuclear stress test and cardiac clearance by Dr. Mireles next week.  He reports mild shortness of breath with exertion.  Denies chest pain.  Denies PND or orthopnea.  He does report dyspnea on exertion, which is likely  "multifactorial.  I suspect he has some degree of COPD or other chronic lung disease.    The patient's relevant past medical, surgical, and social history was reviewed in Epic.   Lab results are reviewed with the patient today. CBC reveals improved hemoglobin at 12.5.  B-12 trending down at 455.  Fasting glucose 106.  Renal function improved from four months ago with creatinine 2.16 improved from 2.44.  .       Review of Systems   Constitutional: Negative for chills, fatigue and fever.   HENT: Negative for congestion, ear pain, postnasal drip, sinus pressure and sore throat.    Respiratory: Negative for cough, shortness of breath and wheezing.    Cardiovascular: Negative for chest pain, palpitations and leg swelling.   Gastrointestinal: Negative for abdominal pain, blood in stool, constipation, diarrhea, nausea and vomiting.   Endocrine: Negative for cold intolerance, heat intolerance, polydipsia and polyuria.   Genitourinary: Negative for dysuria, frequency, hematuria and urgency.   Musculoskeletal: Positive for arthralgias.   Skin: Negative for rash.   Neurological: Negative for syncope and weakness.        Objective     Visit Vitals  /60   Pulse 52   Temp 96.9 °F (36.1 °C) (Temporal)   Ht 175.3 cm (69\")   Wt 76.3 kg (168 lb 3.2 oz)   BMI 24.84 kg/m²     Physical Exam   Constitutional: He is oriented to person, place, and time. He appears well-developed and well-nourished. No distress.   Appears younger than stated age.   HENT:   Head: Normocephalic and atraumatic.   Nose: Right sinus exhibits no maxillary sinus tenderness and no frontal sinus tenderness. Left sinus exhibits no maxillary sinus tenderness and no frontal sinus tenderness.   Mouth/Throat: Uvula is midline, oropharynx is clear and moist and mucous membranes are normal. No oral lesions. No tonsillar exudate.   Eyes: Pupils are equal, round, and reactive to light. Conjunctivae and EOM are normal.   Neck: Trachea normal. Neck supple. No JVD " present. Carotid bruit is not present. No tracheal deviation present. No thyroid mass and no thyromegaly present.   Cardiovascular: Normal rate, normal heart sounds and intact distal pulses.  No extrasystoles are present. PMI is not displaced.   No murmur heard.  Regular rhythm with occasional ectopy.      Pulmonary/Chest: Effort normal and breath sounds normal. No accessory muscle usage. No respiratory distress. He has no decreased breath sounds. He has no wheezes. He has no rhonchi. He has no rales.   Chronic lung sounds.    Abdominal: Soft. Bowel sounds are normal. He exhibits no distension. There is no hepatosplenomegaly. There is no tenderness.     Vascular Status -  His right foot exhibits normal foot vasculature  and no edema. His left foot exhibits normal foot vasculature  and no edema.  Lymphadenopathy:     He has no cervical adenopathy.   Neurological: He is alert and oriented to person, place, and time. No cranial nerve deficit. Coordination normal.   Unable to detect adequate pulse at bilateral ankles.          Skin: Skin is warm, dry and intact. No rash noted. No cyanosis. Nails show no clubbing.   Psychiatric: He has a normal mood and affect. His speech is normal and behavior is normal. Judgment and thought content normal.   Vitals reviewed.        Assessment/Plan      This 89-year-old male has CAD, extensive PAD with claudication, diet-controlled diabetes, relatively mild CHF, and stage IV CKD among other medical problems, see above.  He is on chronic, low-dose prednisone 5 mg q.d. I suspect he has some degree of chronic lung disease, but am not aware of any recent PFTs.  He understands my opinion that he would be high risk for perioperative and postoperative complications.  He still wishes to proceed.  I recommend preoperative evaluation by his cardiologist, Dr. Bossman Mireles, which is scheduled for next week.  If cleared to proceed with surgery, avoid all NSAIDs and other nephrotoxic medications in  the patient with stage IV CKD.  I would recommend obtaining PFTs prior to surgery.    Continue the current vascular risk factor modifications, including Crestor and daily aspirin. Continue current blood pressure medications. Continue to avoid NSAIDs and other nephrotoxic drugs.    Continue management of chronic venous insufficiency including maintaining a goal body weight, elevating lower extremities when not ambulating, sodium restriction and compliance with wearing the compression stockings.     After informed verbal consent, patient is given B-12 injection today.  Continue to follow with Dr. Mccord for iron deficiency anemia.  We will continue to monitor anemia as well.      Continue current diabetic management and monitoring.  He remains diet controlled.     Continue Prilosec for GERD.        Return in four months for follow up with fasting labs one week prior.      Scribed for Dr. Rodriguez by Johanna Reyes [facility].     Diagnoses and all orders for this visit:    Essential hypertension  -     CBC Auto Differential; Future  -     Comprehensive Metabolic Panel; Future    B12 deficiency  -     Vitamin B12; Future    Mixed hyperlipidemia  -     LDL Cholesterol, Direct; Future  -     TSH; Future    Coronary arteriosclerosis  -     LDL Cholesterol, Direct; Future    Peripheral vascular disease (CMS/[facility])    Type 2 diabetes mellitus with stage 4 chronic kidney disease, without long-term current use of insulin (CMS/[facility])  -     Hemoglobin A1c; Future  -     TSH; Future    Claudication (CMS/[facility])    Chronic kidney disease, stage IV (severe) (CMS/[facility])  -     Comprehensive Metabolic Panel; Future    Other iron deficiency anemia  -     CBC Auto Differential; Future  -     Iron Profile; Future        Lab on 06/30/2020   Component Date Value Ref Range Status   • WBC 06/30/2020 8.67  3.40 - 10.80 10*3/mm3 Final   • RBC 06/30/2020 4.08* 4.14 - 5.80 10*6/mm3 Final   • Hemoglobin 06/30/2020 12.5* 13.0 - 17.7 g/dL Final   • Hematocrit  06/30/2020 38.7  37.5 - 51.0 % Final   • MCV 06/30/2020 94.9  79.0 - 97.0 fL Final   • MCH 06/30/2020 30.6  26.6 - 33.0 pg Final   • MCHC 06/30/2020 32.3  31.5 - 35.7 g/dL Final   • RDW 06/30/2020 15.3  12.3 - 15.4 % Final   • RDW-SD 06/30/2020 51.9  37.0 - 54.0 fl Final   • MPV 06/30/2020 11.0  6.0 - 12.0 fL Final   • Platelets 06/30/2020 186  140 - 450 10*3/mm3 Final   • Glucose 06/30/2020 106* 70 - 99 mg/dL Final   • BUN 06/30/2020 46* 7 - 23 mg/dL Final   • Creatinine 06/30/2020 2.16* 0.70 - 1.30 mg/dL Final   • Sodium 06/30/2020 139  137 - 145 mmol/L Final   • Potassium 06/30/2020 4.7  3.4 - 5.0 mmol/L Final   • Chloride 06/30/2020 104  101 - 112 mmol/L Final   • CO2 06/30/2020 27.0  22.0 - 30.0 mmol/L Final   • Calcium 06/30/2020 9.8  8.4 - 10.2 mg/dL Final   • Total Protein 06/30/2020 7.3  6.3 - 8.6 g/dL Final   • Albumin 06/30/2020 4.30  3.50 - 5.00 g/dL Final   • ALT (SGPT) 06/30/2020 13  <=50 U/L Final   • AST (SGOT) 06/30/2020 22  17 - 59 U/L Final   • Alkaline Phosphatase 06/30/2020 125  38 - 126 U/L Final   • Total Bilirubin 06/30/2020 0.5  0.2 - 1.3 mg/dL Final   • eGFR Non African Amer 06/30/2020 29* 42 - 98 mL/min/1.73 Final   • Globulin 06/30/2020 3.0  2.3 - 3.5 gm/dL Final   • A/G Ratio 06/30/2020 1.4  1.1 - 1.8 g/dL Final   • BUN/Creatinine Ratio 06/30/2020 21.3  7.0 - 25.0 Final   • Anion Gap 06/30/2020 8.0  5.0 - 15.0 mmol/L Final   • Hemoglobin A1C 06/30/2020 5.87* 4.80 - 5.60 % Final   • LDL Cholesterol  06/30/2020 114* 0 - 100 mg/dL Final   • PSA 06/30/2020 0.079  0.000 - 4.000 ng/mL Final   • Vitamin B-12 06/30/2020 455  211 - 946 pg/mL Final   • Ferritin 06/30/2020 170.00  30.00 - 400.00 ng/mL Final   • Iron 06/30/2020 73  59 - 158 mcg/dL Final   • Iron Saturation 06/30/2020 20  20 - 50 % Final   • Transferrin 06/30/2020 244  200 - 360 mg/dL Final   • TIBC 06/30/2020 364  298 - 536 mcg/dL Final   • Neutrophil % 06/30/2020 59.0  42.7 - 76.0 % Final   • Lymphocyte % 06/30/2020 29.0  19.6 -  45.3 % Final   • Monocyte % 06/30/2020 8.0  5.0 - 12.0 % Final   • Eosinophil % 06/30/2020 2.0  0.3 - 6.2 % Final   • Basophil % 06/30/2020 1.0  0.0 - 1.5 % Final   • Atypical Lymphocyte % 06/30/2020 1.0  0.0 - 5.0 % Final   • Neutrophils Absolute 06/30/2020 5.12  1.70 - 7.00 10*3/mm3 Final   • Lymphocytes Absolute 06/30/2020 2.51  0.70 - 3.10 10*3/mm3 Final   • Monocytes Absolute 06/30/2020 0.69  0.10 - 0.90 10*3/mm3 Final   • Eosinophils Absolute 06/30/2020 0.17  0.00 - 0.40 10*3/mm3 Final   • Basophils Absolute 06/30/2020 0.09  0.00 - 0.20 10*3/mm3 Final   • Anisocytosis 06/30/2020 Slight/1+  None Seen Final   • Elliptocytes 06/30/2020 Slight/1+  None Seen Final   • Macrocytes 06/30/2020 Slight/1+  None Seen Final   • WBC Morphology 06/30/2020 Normal  Normal Final   • Platelet Estimate 06/30/2020 Adequate  Normal Final   • Large Platelets 06/30/2020 Slight/1+  None Seen Final   Lab on 06/24/2020   Component Date Value Ref Range Status   • Glucose 06/24/2020 136* 65 - 99 mg/dL Final   • BUN 06/24/2020 54* 8 - 23 mg/dL Final   • Creatinine 06/24/2020 2.07* 0.76 - 1.27 mg/dL Final   • Sodium 06/24/2020 140  136 - 145 mmol/L Final   • Potassium 06/24/2020 4.8  3.5 - 5.2 mmol/L Final   • Chloride 06/24/2020 104  98 - 107 mmol/L Final   • CO2 06/24/2020 23.0  22.0 - 29.0 mmol/L Final   • Calcium 06/24/2020 9.4  8.2 - 9.6 mg/dL Final   • Total Protein 06/24/2020 6.9  6.0 - 8.5 g/dL Final   • Albumin 06/24/2020 4.40  3.50 - 5.20 g/dL Final   • ALT (SGPT) 06/24/2020 11  1 - 41 U/L Final   • AST (SGOT) 06/24/2020 15  1 - 40 U/L Final   • Alkaline Phosphatase 06/24/2020 112  39 - 117 U/L Final   • Total Bilirubin 06/24/2020 0.3  0.2 - 1.2 mg/dL Final   • eGFR Non African Amer 06/24/2020 30* >60 mL/min/1.73 Final   • Globulin 06/24/2020 2.5  gm/dL Final   • A/G Ratio 06/24/2020 1.8  g/dL Final   • BUN/Creatinine Ratio 06/24/2020 26.1* 7.0 - 25.0 Final   • Anion Gap 06/24/2020 13.0  5.0 - 15.0 mmol/L Final   • WBC 06/24/2020  9.42  3.40 - 10.80 10*3/mm3 Final   • RBC 06/24/2020 4.08* 4.14 - 5.80 10*6/mm3 Final   • Hemoglobin 06/24/2020 12.5* 13.0 - 17.7 g/dL Final   • Hematocrit 06/24/2020 37.3* 37.5 - 51.0 % Final   • MCV 06/24/2020 91.4  79.0 - 97.0 fL Final   • MCH 06/24/2020 30.6  26.6 - 33.0 pg Final   • MCHC 06/24/2020 33.5  31.5 - 35.7 g/dL Final   • RDW 06/24/2020 15.2  12.3 - 15.4 % Final   • RDW-SD 06/24/2020 50.6  37.0 - 54.0 fl Final   • MPV 06/24/2020 10.3  6.0 - 12.0 fL Final   • Platelets 06/24/2020 199  140 - 450 10*3/mm3 Final   • Neutrophil % 06/24/2020 83.9* 42.7 - 76.0 % Final   • Lymphocyte % 06/24/2020 9.7* 19.6 - 45.3 % Final   • Monocyte % 06/24/2020 4.7* 5.0 - 12.0 % Final   • Eosinophil % 06/24/2020 0.2* 0.3 - 6.2 % Final   • Basophil % 06/24/2020 0.5  0.0 - 1.5 % Final   • Immature Grans % 06/24/2020 1.0* 0.0 - 0.5 % Final   • Neutrophils, Absolute 06/24/2020 7.91* 1.70 - 7.00 10*3/mm3 Final   • Lymphocytes, Absolute 06/24/2020 0.91  0.70 - 3.10 10*3/mm3 Final   • Monocytes, Absolute 06/24/2020 0.44  0.10 - 0.90 10*3/mm3 Final   • Eosinophils, Absolute 06/24/2020 0.02  0.00 - 0.40 10*3/mm3 Final   • Basophils, Absolute 06/24/2020 0.05  0.00 - 0.20 10*3/mm3 Final   • Immature Grans, Absolute 06/24/2020 0.09* 0.00 - 0.05 10*3/mm3 Final   • nRBC 06/24/2020 0.0  0.0 - 0.2 /100 WBC Final   ]

## 2020-09-02 ENCOUNTER — OFFICE VISIT (OUTPATIENT)
Dept: FAMILY MEDICINE CLINIC | Facility: CLINIC | Age: 85
End: 2020-09-02

## 2020-09-02 VITALS — HEIGHT: 69 IN | WEIGHT: 160 LBS | BODY MASS INDEX: 23.7 KG/M2

## 2020-09-02 DIAGNOSIS — Z09 HOSPITAL DISCHARGE FOLLOW-UP: Primary | ICD-10-CM

## 2020-09-02 DIAGNOSIS — N18.4 TYPE 2 DIABETES MELLITUS WITH STAGE 4 CHRONIC KIDNEY DISEASE, WITHOUT LONG-TERM CURRENT USE OF INSULIN (HCC): Chronic | ICD-10-CM

## 2020-09-02 DIAGNOSIS — M15.9 PRIMARY OSTEOARTHRITIS INVOLVING MULTIPLE JOINTS: Chronic | ICD-10-CM

## 2020-09-02 DIAGNOSIS — I73.9 CLAUDICATION (HCC): Chronic | ICD-10-CM

## 2020-09-02 DIAGNOSIS — Z96.641 HISTORY OF RIGHT HIP REPLACEMENT: Chronic | ICD-10-CM

## 2020-09-02 DIAGNOSIS — H53.9 VISION CHANGES: ICD-10-CM

## 2020-09-02 DIAGNOSIS — E11.22 TYPE 2 DIABETES MELLITUS WITH STAGE 4 CHRONIC KIDNEY DISEASE, WITHOUT LONG-TERM CURRENT USE OF INSULIN (HCC): Chronic | ICD-10-CM

## 2020-09-02 PROCEDURE — G2025 DIS SITE TELE SVCS RHC/FQHC: HCPCS | Performed by: INTERNAL MEDICINE

## 2020-09-02 RX ORDER — CLOPIDOGREL BISULFATE 75 MG/1
75 TABLET ORAL DAILY
COMMUNITY
Start: 2020-08-31

## 2020-09-02 NOTE — PROGRESS NOTES
Subjective      You have chosen to receive care through a telephone visit. Do you consent to use a telephone visit for your medical care today? Yes  Total visit time: 21 minutes.        History of Present Illness     Sy Florez is a 89 y.o. male who receives care today via telephone visit for hospital follow up.  Patient has a history of PVD, CAD, status post CABG, osteoarthritis of the hip among other medical issues who was admitted to [facility] 08/18/2020 for rehab status post right total hip arthroplasty by Dr. Sahu after patient failed conservative treatments.  The patient completed rehab, meeting goals complicated by worsening renal function.  His diuretics were held with gentle hydration.  Renal function improved prior to discharge and Lasix and Altace were restarted.      Patient was discharged home on 08/26/2020 with his children helping him out at home.  He is undergoing physical therapy at  [facility] twice weekly.  He continues to experience some mild claudication symptoms with the therapy, but overall is tolerating it well and is now ambulating with a cane. The staples were removed yesterday and he reports good results as far as pain management.      He is no longer requiring narcotic pain medication prescribed at discharge.  He is taking Tylenol at night for the pain.  He was discharged home on Plavix 75 mg in additional to 325 mg aspirin, although, the aspirin was discontinued yesterday due to easy bleeding.  His daughter reports they have stopped his magnesium supplement due to diarrhea.      Since hospital discharge, he has noticed sudden acute visual loss including noticing wavy lines and sparkling lights.  He has an appointment with Concepcion Vela, optometry, tomorrow.  I recommended we go ahead and get him scheduled with ophthalmology.        He struggles with osteoarthritis, for which he normally takes low dose prednisone 5 mg daily.  He has been experiencing a flare of  "osteoarthritic pain affecting multiple joints, for which he has been taking some of the prednisone 10 mg he has at home.  I have recommended he try limiting the higher dose to 2 weeks or try alternating the 5 mg and 10 mg dose.            Review of Systems   Constitutional: Negative for chills, fatigue and fever.   HENT: Negative for congestion, ear pain, postnasal drip, sinus pressure and sore throat.    Respiratory: Negative for cough, shortness of breath and wheezing.    Cardiovascular: Negative for chest pain, palpitations and leg swelling.   Gastrointestinal: Negative for abdominal pain, blood in stool, constipation, diarrhea, nausea and vomiting.   Endocrine: Negative for cold intolerance, heat intolerance, polydipsia and polyuria.   Genitourinary: Negative for dysuria, frequency, hematuria and urgency.   Skin: Negative for rash.   Neurological: Negative for syncope and weakness.        Objective     Visit Vitals  Ht 175.3 cm (69\")   Wt 72.6 kg (160 lb)   BMI 23.63 kg/m²       Physical Exam    Future Appointments   Date Time Provider Department Center   11/16/2020  2:30 PM John Rodriguez MD [facility] None   12/16/2020  1:40 PM Char Soto APRN [facility]       Assessment/Plan      Current outpatient and discharge medications have been reconciled for the patient.  Continue the Plavix. Continue with physical therapy.     The osteoarthritis flare is a new problem.  For the osteoarthritis flare, I recommended he limit the higher dose of prednisone 10 mg to two weeks and try to manage with the 5 mg dose. If the current flare of osteoarthritis doesn't settle down, we can discuss pain management strategy. Recommended Tylenol Arthritis.  He has prescription pain medication to use as needed post THR.      The sudden bilateral vision loss is a new problem.  I am concerned because this vision loss occurred while he was on high-dose aspirin for postoperative DVT reduction after his recent hip fracture.  He " continues on daily Plavix.  He has very extensive vascular disease.  We will refer to Dr. House for first available ophthalmology appointment to address acute bilateral vision loss.   He has graciously agreed to see the patient today.  I appreciate his help with this very nice patient.    Continue the current vascular risk factor modifications.     Reviewed management of chronic venous insufficiency including maintaining a goal body weight, elevating lower extremities when not ambulating, sodium restriction and compliance with wearing the compression stockings.       Continue to follow with Dr. Mccord for iron deficiency anemia.      Return November 16th for routine follow up with fasting labs one week prior or sooner if needed.     Scribed for Dr. Rodriguez by Johanna Reyes [facility].     Diagnoses and all orders for this visit:    Hospital discharge follow-up    Primary osteoarthritis involving multiple joints    History of right hip replacement - 8/2020. Dr. Sahu    Vision changes  -     Ambulatory Referral for Diabetic Eye Exam-Ophthalmology    Type 2 diabetes mellitus with stage 4 chronic kidney disease, without long-term current use of insulin (CMS/[facility])    Claudication (CMS/[facility])    Other orders  -     clopidogrel (PLAVIX) 75 MG tablet; Take 75 mg by mouth Daily.        No visits with results within 3 Week(s) from this visit.   Latest known visit with results is:   Lab on 06/30/2020   Component Date Value Ref Range Status   • WBC 06/30/2020 8.67  3.40 - 10.80 10*3/mm3 Final   • RBC 06/30/2020 4.08* 4.14 - 5.80 10*6/mm3 Final   • Hemoglobin 06/30/2020 12.5* 13.0 - 17.7 g/dL Final   • Hematocrit 06/30/2020 38.7  37.5 - 51.0 % Final   • MCV 06/30/2020 94.9  79.0 - 97.0 fL Final   • MCH 06/30/2020 30.6  26.6 - 33.0 pg Final   • MCHC 06/30/2020 32.3  31.5 - 35.7 g/dL Final   • RDW 06/30/2020 15.3  12.3 - 15.4 % Final   • RDW-SD 06/30/2020 51.9  37.0 - 54.0 fl Final   • MPV 06/30/2020 11.0  6.0 - 12.0 fL Final   •  Platelets 06/30/2020 186  140 - 450 10*3/mm3 Final   • Glucose 06/30/2020 106* 70 - 99 mg/dL Final   • BUN 06/30/2020 46* 7 - 23 mg/dL Final   • Creatinine 06/30/2020 2.16* 0.70 - 1.30 mg/dL Final   • Sodium 06/30/2020 139  137 - 145 mmol/L Final   • Potassium 06/30/2020 4.7  3.4 - 5.0 mmol/L Final   • Chloride 06/30/2020 104  101 - 112 mmol/L Final   • CO2 06/30/2020 27.0  22.0 - 30.0 mmol/L Final   • Calcium 06/30/2020 9.8  8.4 - 10.2 mg/dL Final   • Total Protein 06/30/2020 7.3  6.3 - 8.6 g/dL Final   • Albumin 06/30/2020 4.30  3.50 - 5.00 g/dL Final   • ALT (SGPT) 06/30/2020 13  <=50 U/L Final   • AST (SGOT) 06/30/2020 22  17 - 59 U/L Final   • Alkaline Phosphatase 06/30/2020 125  38 - 126 U/L Final   • Total Bilirubin 06/30/2020 0.5  0.2 - 1.3 mg/dL Final   • eGFR Non African Amer 06/30/2020 29* 42 - 98 mL/min/1.73 Final   • Globulin 06/30/2020 3.0  2.3 - 3.5 gm/dL Final   • A/G Ratio 06/30/2020 1.4  1.1 - 1.8 g/dL Final   • BUN/Creatinine Ratio 06/30/2020 21.3  7.0 - 25.0 Final   • Anion Gap 06/30/2020 8.0  5.0 - 15.0 mmol/L Final   • Hemoglobin A1C 06/30/2020 5.87* 4.80 - 5.60 % Final   • LDL Cholesterol  06/30/2020 114* 0 - 100 mg/dL Final   • PSA 06/30/2020 0.079  0.000 - 4.000 ng/mL Final   • Vitamin B-12 06/30/2020 455  211 - 946 pg/mL Final   • Ferritin 06/30/2020 170.00  30.00 - 400.00 ng/mL Final   • Iron 06/30/2020 73  59 - 158 mcg/dL Final   • Iron Saturation 06/30/2020 20  20 - 50 % Final   • Transferrin 06/30/2020 244  200 - 360 mg/dL Final   • TIBC 06/30/2020 364  298 - 536 mcg/dL Final   • Neutrophil % 06/30/2020 59.0  42.7 - 76.0 % Final   • Lymphocyte % 06/30/2020 29.0  19.6 - 45.3 % Final   • Monocyte % 06/30/2020 8.0  5.0 - 12.0 % Final   • Eosinophil % 06/30/2020 2.0  0.3 - 6.2 % Final   • Basophil % 06/30/2020 1.0  0.0 - 1.5 % Final   • Atypical Lymphocyte % 06/30/2020 1.0  0.0 - 5.0 % Final   • Neutrophils Absolute 06/30/2020 5.12  1.70 - 7.00 10*3/mm3 Final   • Lymphocytes Absolute  06/30/2020 2.51  0.70 - 3.10 10*3/mm3 Final   • Monocytes Absolute 06/30/2020 0.69  0.10 - 0.90 10*3/mm3 Final   • Eosinophils Absolute 06/30/2020 0.17  0.00 - 0.40 10*3/mm3 Final   • Basophils Absolute 06/30/2020 0.09  0.00 - 0.20 10*3/mm3 Final   • Anisocytosis 06/30/2020 Slight/1+  None Seen Final   • Elliptocytes 06/30/2020 Slight/1+  None Seen Final   • Macrocytes 06/30/2020 Slight/1+  None Seen Final   • WBC Morphology 06/30/2020 Normal  Normal Final   • Platelet Estimate 06/30/2020 Adequate  Normal Final   • Large Platelets 06/30/2020 Slight/1+  None Seen Final   ]

## 2020-09-08 RX ORDER — AMLODIPINE BESYLATE 5 MG/1
TABLET ORAL
Qty: 30 TABLET | Refills: 5 | Status: SHIPPED | OUTPATIENT
Start: 2020-09-08 | End: 2021-01-01

## 2020-10-27 ENCOUNTER — OFFICE VISIT (OUTPATIENT)
Dept: FAMILY MEDICINE CLINIC | Facility: CLINIC | Age: 85
End: 2020-10-27

## 2020-10-27 VITALS — HEIGHT: 70 IN | WEIGHT: 160 LBS | BODY MASS INDEX: 22.9 KG/M2

## 2020-10-27 DIAGNOSIS — Z20.822 CLOSE EXPOSURE TO COVID-19 VIRUS: Primary | ICD-10-CM

## 2020-10-27 DIAGNOSIS — M15.9 PRIMARY OSTEOARTHRITIS INVOLVING MULTIPLE JOINTS: ICD-10-CM

## 2020-10-27 DIAGNOSIS — Z96.641 HISTORY OF RIGHT HIP REPLACEMENT: ICD-10-CM

## 2020-10-27 DIAGNOSIS — I25.10 CORONARY ARTERIOSCLEROSIS: Chronic | ICD-10-CM

## 2020-10-27 PROCEDURE — G2025 DIS SITE TELE SVCS RHC/FQHC: HCPCS | Performed by: INTERNAL MEDICINE

## 2020-10-27 NOTE — PROGRESS NOTES
Subjective      You have chosen to receive care through a telephone visit. Do you consent to use a telephone visit for your medical care today? Yes  Total visit time: 14 minutes.     History of Present Illness      Sy Florez is a 89 y.o. male with multiple medical issues including PVD, CAD, status post CABG, osteoarthritis of the hip among other medical issues who was exposed to COVID-19 3 days ago through a 20-year-old sitter staying with him for several hours on both Friday and Saturday.  He remains asymptomatic.  She had no symptoms when she was around him on Friday and Saturday.  He denies headache, body aches, anosmia, ageusia, GI symptoms, sore throat, rhinitis, fever, shortness of breath, or cough.  He is monitoring his body temperature regularly.  We faxed orders to [facility] earlier today for patient to be scheduled for COVID swab. We checked on this after patient was not contacted today.  We are assured he will be contacted in the morning with an appointment since the  had left for the day.  He has another sitter during the week, although, was not in contact with the weekend sitter.  If patient tests positive, the weekday sitter will need to be tested and quarantined.       He continues to heal post right total hip arthroplasty by Dr. Sahu.  He still has lots of other arthritic aches and pains, but the hip pain is vastly improved.  No falls since he has been home.        Review of Systems   Constitutional: Negative for chills, fatigue and fever.   HENT: Negative for congestion, ear pain, postnasal drip, sinus pressure and sore throat.    Respiratory: Negative for cough, shortness of breath and wheezing.    Cardiovascular: Negative for chest pain, palpitations and leg swelling.   Gastrointestinal: Negative for abdominal pain, blood in stool, constipation, diarrhea, nausea and vomiting.   Endocrine: Negative for cold intolerance, heat intolerance, polydipsia and polyuria.   Genitourinary:  "Negative for dysuria, frequency, hematuria and urgency.   Skin: Negative for rash.   Neurological: Negative for syncope and weakness.      Objective     Visit Vitals  Ht 176.5 cm (69.5\") Comment: per patient   Wt 72.6 kg (160 lb) Comment: per patient   BMI 23.29 kg/m²       Physical Exam    Future Appointments   Date Time Provider Department Center   11/16/2020  2:30 PM John Rodriguez MD [facility] None   12/16/2020  1:40 PM Char Soto APRN [facility]       Assessment/Plan      We have faxed orders to [facility] for patient to be scheduled for COVID-19 testing due to exposure through a sitter who tested positive for COVID.  Patient is currently asymptomatic.  He has multiple serious risk factors.  I recommended patient take supplemental vitamin C 500 mg daily, supplemental vitamin D 1000 IU daily, and OTC zinc between 100 to 200 mg daily and may benefit from taking 1/2 tablet of Centrum Silver multivitamin daily, all to be continued for approximately 2 weeks after negative results.  Patient can direct specific questions regarding mandated quarantine restrictions to the [facility].  Notify us tomorrow if patient is not contacted for testing.      Continue the current postoperative rehab for his hip.  He seems to be doing well.  He has high risk for falls.  Fall precautions.    We will repeat telemedicine visit next month (11/16/2020) for routine follow up with fasting labs one week prior or sooner if needed.      Scribed for Dr. Rodriguez by Johanna Reyes [facility].     Diagnoses and all orders for this visit:    Close exposure to COVID-19 virus    Primary osteoarthritis involving multiple joints    History of right hip replacement - 8/2020. Dr. Sahu    Coronary arteriosclerosis        No visits with results within 3 Week(s) from this visit.   Latest known visit with results is:   Lab on 06/30/2020   Component Date Value Ref Range Status   • WBC 06/30/2020 8.67  3.40 - 10.80 10*3/mm3 " Final   • RBC 06/30/2020 4.08* 4.14 - 5.80 10*6/mm3 Final   • Hemoglobin 06/30/2020 12.5* 13.0 - 17.7 g/dL Final   • Hematocrit 06/30/2020 38.7  37.5 - 51.0 % Final   • MCV 06/30/2020 94.9  79.0 - 97.0 fL Final   • MCH 06/30/2020 30.6  26.6 - 33.0 pg Final   • MCHC 06/30/2020 32.3  31.5 - 35.7 g/dL Final   • RDW 06/30/2020 15.3  12.3 - 15.4 % Final   • RDW-SD 06/30/2020 51.9  37.0 - 54.0 fl Final   • MPV 06/30/2020 11.0  6.0 - 12.0 fL Final   • Platelets 06/30/2020 186  140 - 450 10*3/mm3 Final   • Glucose 06/30/2020 106* 70 - 99 mg/dL Final   • BUN 06/30/2020 46* 7 - 23 mg/dL Final   • Creatinine 06/30/2020 2.16* 0.70 - 1.30 mg/dL Final   • Sodium 06/30/2020 139  137 - 145 mmol/L Final   • Potassium 06/30/2020 4.7  3.4 - 5.0 mmol/L Final   • Chloride 06/30/2020 104  101 - 112 mmol/L Final   • CO2 06/30/2020 27.0  22.0 - 30.0 mmol/L Final   • Calcium 06/30/2020 9.8  8.4 - 10.2 mg/dL Final   • Total Protein 06/30/2020 7.3  6.3 - 8.6 g/dL Final   • Albumin 06/30/2020 4.30  3.50 - 5.00 g/dL Final   • ALT (SGPT) 06/30/2020 13  <=50 U/L Final   • AST (SGOT) 06/30/2020 22  17 - 59 U/L Final   • Alkaline Phosphatase 06/30/2020 125  38 - 126 U/L Final   • Total Bilirubin 06/30/2020 0.5  0.2 - 1.3 mg/dL Final   • eGFR Non African Amer 06/30/2020 29* 42 - 98 mL/min/1.73 Final   • Globulin 06/30/2020 3.0  2.3 - 3.5 gm/dL Final   • A/G Ratio 06/30/2020 1.4  1.1 - 1.8 g/dL Final   • BUN/Creatinine Ratio 06/30/2020 21.3  7.0 - 25.0 Final   • Anion Gap 06/30/2020 8.0  5.0 - 15.0 mmol/L Final   • Hemoglobin A1C 06/30/2020 5.87* 4.80 - 5.60 % Final   • LDL Cholesterol  06/30/2020 114* 0 - 100 mg/dL Final   • PSA 06/30/2020 0.079  0.000 - 4.000 ng/mL Final   • Vitamin B-12 06/30/2020 455  211 - 946 pg/mL Final   • Ferritin 06/30/2020 170.00  30.00 - 400.00 ng/mL Final   • Iron 06/30/2020 73  59 - 158 mcg/dL Final   • Iron Saturation 06/30/2020 20  20 - 50 % Final   • Transferrin 06/30/2020 244  200 - 360 mg/dL Final   • TIBC  06/30/2020 364  298 - 536 mcg/dL Final   • Neutrophil % 06/30/2020 59.0  42.7 - 76.0 % Final   • Lymphocyte % 06/30/2020 29.0  19.6 - 45.3 % Final   • Monocyte % 06/30/2020 8.0  5.0 - 12.0 % Final   • Eosinophil % 06/30/2020 2.0  0.3 - 6.2 % Final   • Basophil % 06/30/2020 1.0  0.0 - 1.5 % Final   • Atypical Lymphocyte % 06/30/2020 1.0  0.0 - 5.0 % Final   • Neutrophils Absolute 06/30/2020 5.12  1.70 - 7.00 10*3/mm3 Final   • Lymphocytes Absolute 06/30/2020 2.51  0.70 - 3.10 10*3/mm3 Final   • Monocytes Absolute 06/30/2020 0.69  0.10 - 0.90 10*3/mm3 Final   • Eosinophils Absolute 06/30/2020 0.17  0.00 - 0.40 10*3/mm3 Final   • Basophils Absolute 06/30/2020 0.09  0.00 - 0.20 10*3/mm3 Final   • Anisocytosis 06/30/2020 Slight/1+  None Seen Final   • Elliptocytes 06/30/2020 Slight/1+  None Seen Final   • Macrocytes 06/30/2020 Slight/1+  None Seen Final   • WBC Morphology 06/30/2020 Normal  Normal Final   • Platelet Estimate 06/30/2020 Adequate  Normal Final   • Large Platelets 06/30/2020 Slight/1+  None Seen Final   ]

## 2020-10-29 DIAGNOSIS — I73.9 CLAUDICATION (HCC): ICD-10-CM

## 2020-11-02 RX ORDER — ISOSORBIDE MONONITRATE 30 MG/1
30 TABLET, EXTENDED RELEASE ORAL DAILY
COMMUNITY

## 2020-11-02 RX ORDER — GABAPENTIN 600 MG/1
TABLET ORAL
Qty: 60 TABLET | Refills: 5 | Status: SHIPPED | OUTPATIENT
Start: 2020-11-02 | End: 2021-01-08 | Stop reason: SDUPTHER

## 2020-11-11 ENCOUNTER — LAB (OUTPATIENT)
Dept: LAB | Facility: OTHER | Age: 85
End: 2020-11-11

## 2020-11-11 DIAGNOSIS — N18.4 CHRONIC KIDNEY DISEASE, STAGE IV (SEVERE) (HCC): Chronic | ICD-10-CM

## 2020-11-11 DIAGNOSIS — N18.4 TYPE 2 DIABETES MELLITUS WITH STAGE 4 CHRONIC KIDNEY DISEASE, WITHOUT LONG-TERM CURRENT USE OF INSULIN (HCC): Chronic | ICD-10-CM

## 2020-11-11 DIAGNOSIS — I25.10 CORONARY ARTERIOSCLEROSIS: Chronic | ICD-10-CM

## 2020-11-11 DIAGNOSIS — I10 ESSENTIAL HYPERTENSION: Chronic | ICD-10-CM

## 2020-11-11 DIAGNOSIS — E78.2 MIXED HYPERLIPIDEMIA: Chronic | ICD-10-CM

## 2020-11-11 DIAGNOSIS — E11.22 TYPE 2 DIABETES MELLITUS WITH STAGE 4 CHRONIC KIDNEY DISEASE, WITHOUT LONG-TERM CURRENT USE OF INSULIN (HCC): Chronic | ICD-10-CM

## 2020-11-11 DIAGNOSIS — D50.8 OTHER IRON DEFICIENCY ANEMIA: Chronic | ICD-10-CM

## 2020-11-11 DIAGNOSIS — E53.8 B12 DEFICIENCY: ICD-10-CM

## 2020-11-11 LAB
ALBUMIN SERPL-MCNC: 4.3 G/DL (ref 3.5–5)
ALBUMIN/GLOB SERPL: 1.5 G/DL (ref 1.1–1.8)
ALP SERPL-CCNC: 116 U/L (ref 38–126)
ALT SERPL W P-5'-P-CCNC: 22 U/L
ANION GAP SERPL CALCULATED.3IONS-SCNC: 10 MMOL/L (ref 5–15)
ARTICHOKE IGE QN: 102 MG/DL (ref 0–100)
AST SERPL-CCNC: 27 U/L (ref 17–59)
BASOPHILS # BLD AUTO: 0.05 10*3/MM3 (ref 0–0.2)
BASOPHILS NFR BLD AUTO: 0.6 % (ref 0–1.5)
BILIRUB SERPL-MCNC: 0.4 MG/DL (ref 0.2–1.3)
BUN SERPL-MCNC: 58 MG/DL (ref 7–23)
BUN/CREAT SERPL: 24.7 (ref 7–25)
CALCIUM SPEC-SCNC: 9.8 MG/DL (ref 8.4–10.2)
CHLORIDE SERPL-SCNC: 109 MMOL/L (ref 101–112)
CO2 SERPL-SCNC: 23 MMOL/L (ref 22–30)
CREAT SERPL-MCNC: 2.35 MG/DL (ref 0.7–1.3)
DEPRECATED RDW RBC AUTO: 48.9 FL (ref 37–54)
EOSINOPHIL # BLD AUTO: 0.06 10*3/MM3 (ref 0–0.4)
EOSINOPHIL NFR BLD AUTO: 0.7 % (ref 0.3–6.2)
ERYTHROCYTE [DISTWIDTH] IN BLOOD BY AUTOMATED COUNT: 14.9 % (ref 12.3–15.4)
FOLATE SERPL-MCNC: >20 NG/ML (ref 4.78–24.2)
GFR SERPL CREATININE-BSD FRML MDRD: 26 ML/MIN/1.73 (ref 42–98)
GLOBULIN UR ELPH-MCNC: 2.9 GM/DL (ref 2.3–3.5)
GLUCOSE SERPL-MCNC: 123 MG/DL (ref 70–99)
HBA1C MFR BLD: 5.83 % (ref 4.8–5.6)
HCT VFR BLD AUTO: 33.8 % (ref 37.5–51)
HGB BLD-MCNC: 10.1 G/DL (ref 13–17.7)
IRON 24H UR-MRATE: 29 MCG/DL (ref 59–158)
IRON SATN MFR SERPL: 7 % (ref 20–50)
LYMPHOCYTES # BLD AUTO: 0.62 10*3/MM3 (ref 0.7–3.1)
LYMPHOCYTES NFR BLD AUTO: 7.5 % (ref 19.6–45.3)
MCH RBC QN AUTO: 27.6 PG (ref 26.6–33)
MCHC RBC AUTO-ENTMCNC: 29.9 G/DL (ref 31.5–35.7)
MCV RBC AUTO: 92.3 FL (ref 79–97)
MONOCYTES # BLD AUTO: 0.69 10*3/MM3 (ref 0.1–0.9)
MONOCYTES NFR BLD AUTO: 8.3 % (ref 5–12)
NEUTROPHILS NFR BLD AUTO: 6.88 10*3/MM3 (ref 1.7–7)
NEUTROPHILS NFR BLD AUTO: 82.9 % (ref 42.7–76)
PLATELET # BLD AUTO: 204 10*3/MM3 (ref 140–450)
PMV BLD AUTO: 11.2 FL (ref 6–12)
POTASSIUM SERPL-SCNC: 5 MMOL/L (ref 3.4–5)
PROT SERPL-MCNC: 7.2 G/DL (ref 6.3–8.6)
RBC # BLD AUTO: 3.66 10*6/MM3 (ref 4.14–5.8)
SODIUM SERPL-SCNC: 142 MMOL/L (ref 137–145)
TIBC SERPL-MCNC: 429 MCG/DL (ref 298–536)
TRANSFERRIN SERPL-MCNC: 288 MG/DL (ref 200–360)
TSH SERPL DL<=0.05 MIU/L-ACNC: 1.96 UIU/ML (ref 0.27–4.2)
VIT B12 BLD-MCNC: 587 PG/ML (ref 211–946)
VIT B12 BLD-MCNC: 597 PG/ML (ref 211–946)
WBC # BLD AUTO: 8.3 10*3/MM3 (ref 3.4–10.8)

## 2020-11-11 PROCEDURE — 80053 COMPREHEN METABOLIC PANEL: CPT | Performed by: NURSE PRACTITIONER

## 2020-11-11 PROCEDURE — 85025 COMPLETE CBC W/AUTO DIFF WBC: CPT | Performed by: NURSE PRACTITIONER

## 2020-11-11 PROCEDURE — 36415 COLL VENOUS BLD VENIPUNCTURE: CPT | Performed by: NURSE PRACTITIONER

## 2020-11-11 PROCEDURE — 82746 ASSAY OF FOLIC ACID SERUM: CPT | Performed by: NURSE PRACTITIONER

## 2020-11-11 PROCEDURE — 82607 VITAMIN B-12: CPT | Performed by: INTERNAL MEDICINE

## 2020-11-11 PROCEDURE — 84443 ASSAY THYROID STIM HORMONE: CPT | Performed by: INTERNAL MEDICINE

## 2020-11-11 PROCEDURE — 83540 ASSAY OF IRON: CPT | Performed by: INTERNAL MEDICINE

## 2020-11-11 PROCEDURE — 82607 VITAMIN B-12: CPT | Performed by: NURSE PRACTITIONER

## 2020-11-11 PROCEDURE — 84466 ASSAY OF TRANSFERRIN: CPT | Performed by: INTERNAL MEDICINE

## 2020-11-11 PROCEDURE — 83721 ASSAY OF BLOOD LIPOPROTEIN: CPT | Performed by: INTERNAL MEDICINE

## 2020-11-11 PROCEDURE — 83036 HEMOGLOBIN GLYCOSYLATED A1C: CPT | Performed by: INTERNAL MEDICINE

## 2020-11-16 ENCOUNTER — OFFICE VISIT (OUTPATIENT)
Dept: FAMILY MEDICINE CLINIC | Facility: CLINIC | Age: 85
End: 2020-11-16

## 2020-11-16 VITALS — BODY MASS INDEX: 22.9 KG/M2 | WEIGHT: 160 LBS | HEIGHT: 70 IN

## 2020-11-16 DIAGNOSIS — N18.4 TYPE 2 DIABETES MELLITUS WITH STAGE 4 CHRONIC KIDNEY DISEASE, WITHOUT LONG-TERM CURRENT USE OF INSULIN (HCC): Chronic | ICD-10-CM

## 2020-11-16 DIAGNOSIS — E53.8 B12 DEFICIENCY: Chronic | ICD-10-CM

## 2020-11-16 DIAGNOSIS — R19.7 DIARRHEA, UNSPECIFIED TYPE: ICD-10-CM

## 2020-11-16 DIAGNOSIS — E11.22 TYPE 2 DIABETES MELLITUS WITH STAGE 4 CHRONIC KIDNEY DISEASE, WITHOUT LONG-TERM CURRENT USE OF INSULIN (HCC): Chronic | ICD-10-CM

## 2020-11-16 DIAGNOSIS — N18.4 CHRONIC KIDNEY DISEASE, STAGE IV (SEVERE) (HCC): Chronic | ICD-10-CM

## 2020-11-16 DIAGNOSIS — C61 MALIGNANT TUMOR OF PROSTATE (HCC): Chronic | ICD-10-CM

## 2020-11-16 DIAGNOSIS — E11.69 HYPERLIPIDEMIA ASSOCIATED WITH TYPE 2 DIABETES MELLITUS (HCC): Chronic | ICD-10-CM

## 2020-11-16 DIAGNOSIS — E83.42 HYPOMAGNESEMIA: Chronic | ICD-10-CM

## 2020-11-16 DIAGNOSIS — D50.8 OTHER IRON DEFICIENCY ANEMIA: Chronic | ICD-10-CM

## 2020-11-16 DIAGNOSIS — E78.5 HYPERLIPIDEMIA ASSOCIATED WITH TYPE 2 DIABETES MELLITUS (HCC): Chronic | ICD-10-CM

## 2020-11-16 DIAGNOSIS — I50.32 CHRONIC DIASTOLIC (CONGESTIVE) HEART FAILURE (HCC): Chronic | ICD-10-CM

## 2020-11-16 DIAGNOSIS — D50.0 IRON DEFICIENCY ANEMIA DUE TO CHRONIC BLOOD LOSS: ICD-10-CM

## 2020-11-16 DIAGNOSIS — E78.2 MIXED HYPERLIPIDEMIA: Chronic | ICD-10-CM

## 2020-11-16 DIAGNOSIS — I73.9 CLAUDICATION (HCC): Chronic | ICD-10-CM

## 2020-11-16 DIAGNOSIS — Z96.641 HISTORY OF RIGHT HIP REPLACEMENT: Chronic | ICD-10-CM

## 2020-11-16 DIAGNOSIS — I25.10 CORONARY ARTERIOSCLEROSIS: Chronic | ICD-10-CM

## 2020-11-16 DIAGNOSIS — I10 ESSENTIAL HYPERTENSION: Primary | Chronic | ICD-10-CM

## 2020-11-16 DIAGNOSIS — I50.22 CHRONIC SYSTOLIC (CONGESTIVE) HEART FAILURE (HCC): Chronic | ICD-10-CM

## 2020-11-16 PROBLEM — I25.5 ISCHEMIC CARDIOMYOPATHY: Chronic | Status: ACTIVE | Noted: 2020-11-16

## 2020-11-16 PROCEDURE — G2025 DIS SITE TELE SVCS RHC/FQHC: HCPCS | Performed by: INTERNAL MEDICINE

## 2020-11-16 RX ORDER — DIPHENHYDRAMINE HYDROCHLORIDE 50 MG/ML
50 INJECTION INTRAMUSCULAR; INTRAVENOUS AS NEEDED
Status: CANCELLED | OUTPATIENT
Start: 2020-11-16

## 2020-11-16 RX ORDER — SODIUM CHLORIDE 9 MG/ML
250 INJECTION, SOLUTION INTRAVENOUS ONCE
Status: CANCELLED | OUTPATIENT
Start: 2020-11-16

## 2020-11-16 NOTE — PROGRESS NOTES
Subjective      You have chosen to receive care through a telephone visit. Do you consent to use a telephone visit for your medical care today? Yes   Total visit time: 22 minutes.      History of Present Illness     Sy Florez is a 89 y.o. male who receives care via telephone visit for 4-month follow up on stage 4 CKD, CAD, PAD, chronic venous insufficiency, hypertension, high cholesterol, iron deficiency anemia, systolic and diastolic CHF with EF 45%, and extensive peripheral vascular disease, status post femoropopliteal bypass right leg 1997 and left leg 2007.  He continues to heal post right total hip arthroplasty by Dr. Sahu 08/18/2020.  He has completed outpatient therapy and been discharged from PT at [facility].  He reports hip pain has resolved, but he is now reporting knee pain, which he feels has worsened since hip arthroplasty. He is not ambulating with a cane or walker in the home, for which I recommended to help decrease fall risk.   He has a follow up with Dr. Sahu on 12/01/2020 and plans to discuss the knee pain with that visit, although, he has already decided against any additional surgery.  Patient was exposed to COVID-19 three weeks ago through a sitter in the home who tested positive.  Patient tested negative for COVID-19 10/28/2020.  He has remained asymptomatic in regards to viral respiratory symptoms.    Patient reports dyspnea on exertion and some mild wheezing/shortness of breath with exertion. Denies chest pain or palpitations or syncope/presyncope.  Patient was admitted to [facility] 09/2020 due to NSTEMI when his daughter had told him to stop taking daily aspirin due to bruising.  He continued on Plavix.  He continues with cardiovascular medical management as directed by his cardiologist Dr. Brennan.  His cardiology appontment with Dr. Mireles's office was cancelled last week and has not been rescheduled.  Denies CHF exacerbation symptoms such as PND, orthopnea lower  extremity edema worse than baseline.  He has chronic venous insufficiency and dependent edema.  He denies increased edema beyond his baseline.      In discussion, HEARD symptoms are likely related to iron deficiency anemia, with labs revealing hemoglobin and iron levels remain low.  He did not respond well to oral iron in the past, but did respond to IV iron, and will more than likely require IV iron infusion again.  He does not have an appointment with hematology/oncology until 12/16/2020.  I have contacted hematology, and they have kindly agreed to expedite his follow-up visit and iron infusion, if indicated.  He continues on oral folic acid and vitamin B12 injections.    He continues to struggle with diarrhrea reporting 6 to 7 diarrhea stools with foul odor daily, but has not taken anything for symptom relief other than trying Kaopectate without improvement.  Holding OTC vitamins and supplements did not improve diarrhea.  He did have antibiotics in the perioperative period approximately 2 months ago.  I recommended checking stool for C. difficile toxin and diarrhea panel.          He continues to struggle with claudication symptoms in bilateral lower extremities.   Since his PT for rehab of the hip is completed, I recommended he ask his orthopedist about riding a recumbent bicycle to help with the knees and legs.      Labs reveal diabetes management is at goal with dietary efforts.      The patient's relevant past medical, surgical, and social history was reviewed in Epic.   Lab results are reviewed with the patient today.  Fasting glucose 123.  A1c 5.83.  Vitamin B-12 at goal.   LDL cholesterol 102.  Thyroid at goal.  Renal function declined with creatinine 2.35 increased from 2.16.         Review of Systems   Constitutional: Negative for chills, fatigue and fever.   HENT: Negative for congestion, ear pain, postnasal drip, sinus pressure and sore throat.    Respiratory: Negative for cough, shortness of breath and  "wheezing.    Cardiovascular: Negative for chest pain, palpitations and leg swelling.   Gastrointestinal: Negative for abdominal pain, blood in stool, constipation, diarrhea, nausea and vomiting.   Endocrine: Negative for cold intolerance, heat intolerance, polydipsia and polyuria.   Genitourinary: Negative for dysuria, frequency, hematuria and urgency.   Skin: Negative for rash.   Neurological: Negative for syncope and weakness.        Objective     Visit Vitals  Ht 176.5 cm (69.5\")   Wt 72.6 kg (160 lb)   BMI 23.29 kg/m²     No blood pressure obtained today with this telephone visit due to coronavirus pandemic.  Physical Exam      Assessment/Plan       For the new problem of persistent diarrhea, I recommended OTC Imodium per label directions.  l have placed orders for patient to collect stool samples.  C. difficile toxin and diarrhea panel.  We will notify patient with results.       Continue the current vascular risk factor modifications including Crestor and antiplatelet agents.  With the dyspnea on exertion reported in this patient with CHF and recent NSTEMI 09/2020, I  recommended he contact Dr. Mireles's office to reschedule his cardiology appointment, which was cancelled by provider last week.  Patient denies angina, presyncope/syncope, PND or orthopnea.  Continue current BP medications.  I asked him to monitor BP and heart rate at rest episodically and notify  me if not at goal.      Keep follow up appointments with Dr. Sahu to follow up on total right hip arthroplasty.  He will address the worsening knee pain with him.  I recommended he ask about riding a recumbent bicycle to help rehab the knees.     Regarding the iron deficiency anemia, hematology has graciously agreed to see him promptly and arrange IV iron infusion again if indicated..     Continue management plan for chronic venous insufficiency including maintaining a goal body weight, elevating lower extremities when not ambulating, sodium " restriction and compliance with wearing the compression stockings.      Return in four months for follow up with fasting labs one week prior.     Scribed for Dr. Rodriguez by Johanna Reyes [facility].      Diagnoses and all orders for this visit:    Essential hypertension  -     CBC Auto Differential; Future  -     Comprehensive Metabolic Panel; Future    Hyperlipidemia associated with type 2 diabetes mellitus (CMS/[facility])  -     Lipid Panel; Future    Mixed hyperlipidemia  -     Lipid Panel; Future    Coronary arteriosclerosis    B12 deficiency  -     Vitamin B12; Future    Claudication (CMS/[facility])    Other iron deficiency anemia  -     CBC Auto Differential; Future  -     Ferritin; Future  -     Iron Profile; Future    Type 2 diabetes mellitus with stage 4 chronic kidney disease, without long-term current use of insulin (CMS/[facility])  -     Comprehensive Metabolic Panel; Future  -     Hemoglobin A1c; Future    Chronic systolic (congestive) heart failure (CMS/[facility]) - EF 45% per ECHO 7/2020.  Dr. Mireles  -     BNP; Future    Chronic diastolic (congestive) heart failure (CMS/[facility])  -     BNP; Future    Chronic kidney disease, stage IV (severe) (CMS/[facility])  -     Comprehensive Metabolic Panel; Future    Iron deficiency anemia due to chronic blood loss  -     Ferritin; Future  -     Iron Profile; Future    History of right hip replacement - 8/2020. Dr. Sahu    Hypomagnesemia - h/o  -     Magnesium; Future    Malignant tumor of prostate (CMS/[facility])  -     PSA DIAGNOSTIC; Future    Diarrhea, unspecified type  -     Clostridium Difficile Toxin - Stool, Per Rectum; Future  -     Gastrointestinal Panel, PCR - Stool, Per Rectum; Future        Lab on 11/11/2020   Component Date Value Ref Range Status   • Glucose 11/11/2020 123* 70 - 99 mg/dL Final   • BUN 11/11/2020 58* 7 - 23 mg/dL Final   • Creatinine 11/11/2020 2.35* 0.70 - 1.30 mg/dL Final   • Sodium 11/11/2020 142  137 - 145 mmol/L Final   • Potassium 11/11/2020 5.0  3.4 - 5.0 mmol/L  Final   • Chloride 11/11/2020 109  101 - 112 mmol/L Final   • CO2 11/11/2020 23.0  22.0 - 30.0 mmol/L Final   • Calcium 11/11/2020 9.8  8.4 - 10.2 mg/dL Final   • Total Protein 11/11/2020 7.2  6.3 - 8.6 g/dL Final   • Albumin 11/11/2020 4.30  3.50 - 5.00 g/dL Final   • ALT (SGPT) 11/11/2020 22  <=50 U/L Final   • AST (SGOT) 11/11/2020 27  17 - 59 U/L Final   • Alkaline Phosphatase 11/11/2020 116  38 - 126 U/L Final   • Total Bilirubin 11/11/2020 0.4  0.2 - 1.3 mg/dL Final   • eGFR Non African Amer 11/11/2020 26* 42 - 98 mL/min/1.73 Final   • Globulin 11/11/2020 2.9  2.3 - 3.5 gm/dL Final   • A/G Ratio 11/11/2020 1.5  1.1 - 1.8 g/dL Final   • BUN/Creatinine Ratio 11/11/2020 24.7  7.0 - 25.0 Final   • Anion Gap 11/11/2020 10.0  5.0 - 15.0 mmol/L Final   • Folate 11/11/2020 >20.00  4.78 - 24.20 ng/mL Final   • Vitamin B-12 11/11/2020 597  211 - 946 pg/mL Final   • Hemoglobin A1C 11/11/2020 5.83* 4.80 - 5.60 % Final   • Iron 11/11/2020 29* 59 - 158 mcg/dL Final   • Iron Saturation 11/11/2020 7* 20 - 50 % Final   • Transferrin 11/11/2020 288  200 - 360 mg/dL Final   • TIBC 11/11/2020 429  298 - 536 mcg/dL Final   • LDL Cholesterol  11/11/2020 102* 0 - 100 mg/dL Final   • Vitamin B-12 11/11/2020 587  211 - 946 pg/mL Final   • TSH 11/11/2020 1.960  0.270 - 4.200 uIU/mL Final   • WBC 11/11/2020 8.30  3.40 - 10.80 10*3/mm3 Final   • RBC 11/11/2020 3.66* 4.14 - 5.80 10*6/mm3 Final   • Hemoglobin 11/11/2020 10.1* 13.0 - 17.7 g/dL Final   • Hematocrit 11/11/2020 33.8* 37.5 - 51.0 % Final   • MCV 11/11/2020 92.3  79.0 - 97.0 fL Final   • MCH 11/11/2020 27.6  26.6 - 33.0 pg Final   • MCHC 11/11/2020 29.9* 31.5 - 35.7 g/dL Final   • RDW 11/11/2020 14.9  12.3 - 15.4 % Final   • RDW-SD 11/11/2020 48.9  37.0 - 54.0 fl Final   • MPV 11/11/2020 11.2  6.0 - 12.0 fL Final   • Platelets 11/11/2020 204  140 - 450 10*3/mm3 Final   • Neutrophil % 11/11/2020 82.9* 42.7 - 76.0 % Final   • Lymphocyte % 11/11/2020 7.5* 19.6 - 45.3 % Final   •  Monocyte % 11/11/2020 8.3  5.0 - 12.0 % Final   • Eosinophil % 11/11/2020 0.7  0.3 - 6.2 % Final   • Basophil % 11/11/2020 0.6  0.0 - 1.5 % Final   • Neutrophils, Absolute 11/11/2020 6.88  1.70 - 7.00 10*3/mm3 Final   • Lymphocytes, Absolute 11/11/2020 0.62* 0.70 - 3.10 10*3/mm3 Final   • Monocytes, Absolute 11/11/2020 0.69  0.10 - 0.90 10*3/mm3 Final   • Eosinophils, Absolute 11/11/2020 0.06  0.00 - 0.40 10*3/mm3 Final   • Basophils, Absolute 11/11/2020 0.05  0.00 - 0.20 10*3/mm3 Final   ]

## 2020-11-17 ENCOUNTER — TELEPHONE (OUTPATIENT)
Dept: ONCOLOGY | Facility: CLINIC | Age: 85
End: 2020-11-17

## 2020-11-17 ENCOUNTER — LAB (OUTPATIENT)
Dept: LAB | Facility: OTHER | Age: 85
End: 2020-11-17

## 2020-11-23 ENCOUNTER — INFUSION (OUTPATIENT)
Dept: ONCOLOGY | Facility: HOSPITAL | Age: 85
End: 2020-11-23

## 2020-11-23 VITALS
DIASTOLIC BLOOD PRESSURE: 66 MMHG | SYSTOLIC BLOOD PRESSURE: 125 MMHG | TEMPERATURE: 96.4 F | RESPIRATION RATE: 18 BRPM | HEART RATE: 77 BPM

## 2020-11-23 DIAGNOSIS — D50.8 OTHER IRON DEFICIENCY ANEMIA: ICD-10-CM

## 2020-11-23 DIAGNOSIS — E53.8 B12 DEFICIENCY: Primary | ICD-10-CM

## 2020-11-23 DIAGNOSIS — D50.0 IRON DEFICIENCY ANEMIA DUE TO CHRONIC BLOOD LOSS: ICD-10-CM

## 2020-11-23 PROCEDURE — 96374 THER/PROPH/DIAG INJ IV PUSH: CPT | Performed by: NURSE PRACTITIONER

## 2020-11-23 PROCEDURE — 25010000002 FERRIC CARBOXYMALTOSE 750 MG/15ML SOLUTION 15 ML VIAL: Performed by: NURSE PRACTITIONER

## 2020-11-23 RX ORDER — SODIUM CHLORIDE 9 MG/ML
250 INJECTION, SOLUTION INTRAVENOUS ONCE
Status: CANCELLED | OUTPATIENT
Start: 2020-11-30

## 2020-11-23 RX ORDER — METHYLPREDNISOLONE SODIUM SUCCINATE 125 MG/2ML
125 INJECTION, POWDER, LYOPHILIZED, FOR SOLUTION INTRAMUSCULAR; INTRAVENOUS AS NEEDED
Status: DISCONTINUED | OUTPATIENT
Start: 2020-11-23 | End: 2020-11-23 | Stop reason: HOSPADM

## 2020-11-23 RX ORDER — DIPHENHYDRAMINE HYDROCHLORIDE 50 MG/ML
50 INJECTION INTRAMUSCULAR; INTRAVENOUS AS NEEDED
Status: CANCELLED | OUTPATIENT
Start: 2020-11-30

## 2020-11-23 RX ORDER — METHYLPREDNISOLONE SODIUM SUCCINATE 125 MG/2ML
125 INJECTION, POWDER, LYOPHILIZED, FOR SOLUTION INTRAMUSCULAR; INTRAVENOUS AS NEEDED
Status: CANCELLED | OUTPATIENT
Start: 2020-11-30

## 2020-11-23 RX ORDER — SODIUM CHLORIDE 9 MG/ML
250 INJECTION, SOLUTION INTRAVENOUS ONCE
Status: COMPLETED | OUTPATIENT
Start: 2020-11-23 | End: 2020-11-23

## 2020-11-23 RX ORDER — DIPHENHYDRAMINE HYDROCHLORIDE 50 MG/ML
50 INJECTION INTRAMUSCULAR; INTRAVENOUS AS NEEDED
Status: DISCONTINUED | OUTPATIENT
Start: 2020-11-23 | End: 2020-11-23 | Stop reason: HOSPADM

## 2020-11-23 RX ADMIN — SODIUM CHLORIDE 250 ML: 9 INJECTION, SOLUTION INTRAVENOUS at 09:23

## 2020-11-23 RX ADMIN — FERRIC CARBOXYMALTOSE INJECTION 750 MG: 50 INJECTION, SOLUTION INTRAVENOUS at 09:26

## 2020-11-30 ENCOUNTER — APPOINTMENT (OUTPATIENT)
Dept: ONCOLOGY | Facility: HOSPITAL | Age: 85
End: 2020-11-30

## 2020-11-30 RX ORDER — RAMIPRIL 5 MG/1
CAPSULE ORAL
Qty: 30 CAPSULE | Refills: 5 | Status: SHIPPED | OUTPATIENT
Start: 2020-11-30 | End: 2021-01-01

## 2020-12-04 ENCOUNTER — TELEPHONE (OUTPATIENT)
Dept: ONCOLOGY | Facility: CLINIC | Age: 85
End: 2020-12-04

## 2020-12-04 NOTE — TELEPHONE ENCOUNTER
PRIYANKA CALLING ON BEHALF OF PT.     PT RELEASE FROM HOSPITAL YESTERDAY FOR CONGESTIVE HEART FAILURE AND PNEUMONIA.    PRIYANKA WOULD LIKE TO KNOW IF WITH HIS UPCOMING APPT IF WE SHOULD RESCHEDULE PT INFUSION.    BEST C/B: [phone]

## 2020-12-08 NOTE — TELEPHONE ENCOUNTER
Informed patient of information. Gave date/time of new appointments. He verbalized understanding.

## 2020-12-11 ENCOUNTER — INFUSION (OUTPATIENT)
Dept: ONCOLOGY | Facility: HOSPITAL | Age: 85
End: 2020-12-11

## 2020-12-11 VITALS
DIASTOLIC BLOOD PRESSURE: 59 MMHG | HEART RATE: 50 BPM | SYSTOLIC BLOOD PRESSURE: 125 MMHG | RESPIRATION RATE: 18 BRPM | TEMPERATURE: 96.9 F

## 2020-12-11 DIAGNOSIS — D50.8 OTHER IRON DEFICIENCY ANEMIA: ICD-10-CM

## 2020-12-11 DIAGNOSIS — D50.0 IRON DEFICIENCY ANEMIA DUE TO CHRONIC BLOOD LOSS: ICD-10-CM

## 2020-12-11 DIAGNOSIS — E53.8 B12 DEFICIENCY: Primary | ICD-10-CM

## 2020-12-11 PROCEDURE — 96374 THER/PROPH/DIAG INJ IV PUSH: CPT | Performed by: NURSE PRACTITIONER

## 2020-12-11 PROCEDURE — 25010000002 FERRIC CARBOXYMALTOSE 750 MG/15ML SOLUTION 15 ML VIAL: Performed by: NURSE PRACTITIONER

## 2020-12-11 RX ORDER — METHYLPREDNISOLONE SODIUM SUCCINATE 125 MG/2ML
125 INJECTION, POWDER, LYOPHILIZED, FOR SOLUTION INTRAMUSCULAR; INTRAVENOUS AS NEEDED
Status: DISCONTINUED | OUTPATIENT
Start: 2020-12-11 | End: 2020-12-11 | Stop reason: HOSPADM

## 2020-12-11 RX ORDER — DIPHENHYDRAMINE HYDROCHLORIDE 50 MG/ML
50 INJECTION INTRAMUSCULAR; INTRAVENOUS AS NEEDED
Status: CANCELLED | OUTPATIENT
Start: 2020-12-11

## 2020-12-11 RX ORDER — SODIUM CHLORIDE 9 MG/ML
250 INJECTION, SOLUTION INTRAVENOUS ONCE
Status: CANCELLED | OUTPATIENT
Start: 2020-12-11

## 2020-12-11 RX ORDER — SODIUM CHLORIDE 9 MG/ML
250 INJECTION, SOLUTION INTRAVENOUS ONCE
Status: COMPLETED | OUTPATIENT
Start: 2020-12-11 | End: 2020-12-11

## 2020-12-11 RX ORDER — DIPHENHYDRAMINE HYDROCHLORIDE 50 MG/ML
50 INJECTION INTRAMUSCULAR; INTRAVENOUS AS NEEDED
Status: DISCONTINUED | OUTPATIENT
Start: 2020-12-11 | End: 2020-12-11 | Stop reason: HOSPADM

## 2020-12-11 RX ORDER — METHYLPREDNISOLONE SODIUM SUCCINATE 125 MG/2ML
125 INJECTION, POWDER, LYOPHILIZED, FOR SOLUTION INTRAMUSCULAR; INTRAVENOUS AS NEEDED
Status: CANCELLED | OUTPATIENT
Start: 2020-12-11

## 2020-12-11 RX ADMIN — SODIUM CHLORIDE 250 ML: 9 INJECTION, SOLUTION INTRAVENOUS at 10:51

## 2020-12-11 RX ADMIN — FERRIC CARBOXYMALTOSE INJECTION 750 MG: 50 INJECTION, SOLUTION INTRAVENOUS at 10:51

## 2020-12-15 ENCOUNTER — TRANSCRIBE ORDERS (OUTPATIENT)
Dept: LAB | Facility: OTHER | Age: 85
End: 2020-12-15

## 2020-12-15 DIAGNOSIS — D64.9 ANEMIA, UNSPECIFIED TYPE: ICD-10-CM

## 2020-12-15 DIAGNOSIS — N18.4 CHRONIC KIDNEY DISEASE, STAGE IV (SEVERE) (HCC): Primary | ICD-10-CM

## 2020-12-15 DIAGNOSIS — I50.9 CONGESTIVE HEART FAILURE, UNSPECIFIED HF CHRONICITY, UNSPECIFIED HEART FAILURE TYPE (HCC): ICD-10-CM

## 2020-12-16 ENCOUNTER — APPOINTMENT (OUTPATIENT)
Dept: ONCOLOGY | Facility: CLINIC | Age: 85
End: 2020-12-16

## 2020-12-21 ENCOUNTER — LAB (OUTPATIENT)
Dept: LAB | Facility: OTHER | Age: 85
End: 2020-12-21

## 2020-12-21 DIAGNOSIS — N18.4 CHRONIC KIDNEY DISEASE, STAGE IV (SEVERE) (HCC): ICD-10-CM

## 2020-12-21 DIAGNOSIS — I50.9 CONGESTIVE HEART FAILURE, UNSPECIFIED HF CHRONICITY, UNSPECIFIED HEART FAILURE TYPE (HCC): ICD-10-CM

## 2020-12-21 DIAGNOSIS — D64.9 ANEMIA, UNSPECIFIED TYPE: ICD-10-CM

## 2020-12-21 LAB
ALBUMIN SERPL-MCNC: 4.1 G/DL (ref 3.5–5)
ALBUMIN/GLOB SERPL: 1.5 G/DL (ref 1.1–1.8)
ALP SERPL-CCNC: 153 U/L (ref 38–126)
ALT SERPL W P-5'-P-CCNC: 19 U/L
ANION GAP SERPL CALCULATED.3IONS-SCNC: 8 MMOL/L (ref 5–15)
ANISOCYTOSIS BLD QL: ABNORMAL
AST SERPL-CCNC: 27 U/L (ref 17–59)
BILIRUB SERPL-MCNC: 0.5 MG/DL (ref 0.2–1.3)
BUN SERPL-MCNC: 53 MG/DL (ref 7–23)
BUN/CREAT SERPL: 24.3 (ref 7–25)
CALCIUM SPEC-SCNC: 9.1 MG/DL (ref 8.4–10.2)
CHLORIDE SERPL-SCNC: 101 MMOL/L (ref 101–112)
CO2 SERPL-SCNC: 28 MMOL/L (ref 22–30)
CREAT SERPL-MCNC: 2.18 MG/DL (ref 0.7–1.3)
DEPRECATED RDW RBC AUTO: 65.1 FL (ref 37–54)
EOSINOPHIL # BLD MANUAL: 0.08 10*3/MM3 (ref 0–0.4)
EOSINOPHIL NFR BLD MANUAL: 1 % (ref 0.3–6.2)
ERYTHROCYTE [DISTWIDTH] IN BLOOD BY AUTOMATED COUNT: 20 % (ref 12.3–15.4)
GFR SERPL CREATININE-BSD FRML MDRD: 28 ML/MIN/1.73 (ref 42–98)
GLOBULIN UR ELPH-MCNC: 2.7 GM/DL (ref 2.3–3.5)
GLUCOSE SERPL-MCNC: 111 MG/DL (ref 70–99)
HCT VFR BLD AUTO: 37.3 % (ref 37.5–51)
HGB BLD-MCNC: 11.8 G/DL (ref 13–17.7)
LYMPHOCYTES # BLD MANUAL: 0.6 10*3/MM3 (ref 0.7–3.1)
LYMPHOCYTES NFR BLD MANUAL: 8 % (ref 19.6–45.3)
LYMPHOCYTES NFR BLD MANUAL: 9 % (ref 5–12)
MCH RBC QN AUTO: 29.1 PG (ref 26.6–33)
MCHC RBC AUTO-ENTMCNC: 31.6 G/DL (ref 31.5–35.7)
MCV RBC AUTO: 92.1 FL (ref 79–97)
MONOCYTES # BLD AUTO: 0.68 10*3/MM3 (ref 0.1–0.9)
NEUTROPHILS # BLD AUTO: 6.19 10*3/MM3 (ref 1.7–7)
NEUTROPHILS NFR BLD MANUAL: 81 % (ref 42.7–76)
NEUTS BAND NFR BLD MANUAL: 1 % (ref 0–5)
PLATELET # BLD AUTO: 181 10*3/MM3 (ref 140–450)
PMV BLD AUTO: 10.6 FL (ref 6–12)
POTASSIUM SERPL-SCNC: 5.1 MMOL/L (ref 3.4–5)
PROT SERPL-MCNC: 6.8 G/DL (ref 6.3–8.6)
RBC # BLD AUTO: 4.05 10*6/MM3 (ref 4.14–5.8)
SMALL PLATELETS BLD QL SMEAR: ADEQUATE
SODIUM SERPL-SCNC: 137 MMOL/L (ref 137–145)
WBC # BLD AUTO: 7.55 10*3/MM3 (ref 3.4–10.8)
WBC MORPH BLD: NORMAL

## 2020-12-21 PROCEDURE — 80053 COMPREHEN METABOLIC PANEL: CPT | Performed by: INTERNAL MEDICINE

## 2020-12-21 PROCEDURE — 36415 COLL VENOUS BLD VENIPUNCTURE: CPT | Performed by: INTERNAL MEDICINE

## 2020-12-21 PROCEDURE — 85025 COMPLETE CBC W/AUTO DIFF WBC: CPT | Performed by: INTERNAL MEDICINE

## 2021-01-01 ENCOUNTER — OFFICE VISIT (OUTPATIENT)
Dept: ONCOLOGY | Facility: CLINIC | Age: 86
End: 2021-01-01

## 2021-01-01 ENCOUNTER — LAB (OUTPATIENT)
Dept: LAB | Facility: OTHER | Age: 86
End: 2021-01-01

## 2021-01-01 ENCOUNTER — TELEPHONE (OUTPATIENT)
Dept: FAMILY MEDICINE CLINIC | Facility: CLINIC | Age: 86
End: 2021-01-01

## 2021-01-01 ENCOUNTER — TRANSCRIBE ORDERS (OUTPATIENT)
Dept: LAB | Facility: OTHER | Age: 86
End: 2021-01-01

## 2021-01-01 ENCOUNTER — OFFICE VISIT (OUTPATIENT)
Dept: FAMILY MEDICINE CLINIC | Facility: CLINIC | Age: 86
End: 2021-01-01

## 2021-01-01 ENCOUNTER — LAB (OUTPATIENT)
Dept: ONCOLOGY | Facility: HOSPITAL | Age: 86
End: 2021-01-01

## 2021-01-01 ENCOUNTER — TRANSCRIBE ORDERS (OUTPATIENT)
Dept: GENERAL RADIOLOGY | Facility: CLINIC | Age: 86
End: 2021-01-01

## 2021-01-01 ENCOUNTER — BULK ORDERING (OUTPATIENT)
Dept: CASE MANAGEMENT | Facility: OTHER | Age: 86
End: 2021-01-01

## 2021-01-01 VITALS
TEMPERATURE: 97 F | WEIGHT: 179.4 LBS | DIASTOLIC BLOOD PRESSURE: 76 MMHG | HEART RATE: 84 BPM | SYSTOLIC BLOOD PRESSURE: 138 MMHG | HEIGHT: 69 IN | BODY MASS INDEX: 26.57 KG/M2 | OXYGEN SATURATION: 94 %

## 2021-01-01 VITALS
WEIGHT: 172.2 LBS | DIASTOLIC BLOOD PRESSURE: 61 MMHG | TEMPERATURE: 96.5 F | BODY MASS INDEX: 25.43 KG/M2 | HEART RATE: 49 BPM | RESPIRATION RATE: 20 BRPM | SYSTOLIC BLOOD PRESSURE: 130 MMHG

## 2021-01-01 VITALS
HEIGHT: 69 IN | WEIGHT: 168.4 LBS | BODY MASS INDEX: 24.94 KG/M2 | OXYGEN SATURATION: 97 % | RESPIRATION RATE: 18 BRPM | HEART RATE: 50 BPM | TEMPERATURE: 97.2 F | SYSTOLIC BLOOD PRESSURE: 116 MMHG | DIASTOLIC BLOOD PRESSURE: 59 MMHG

## 2021-01-01 VITALS — HEIGHT: 69 IN | WEIGHT: 172 LBS | BODY MASS INDEX: 25.48 KG/M2

## 2021-01-01 VITALS
BODY MASS INDEX: 23.99 KG/M2 | DIASTOLIC BLOOD PRESSURE: 78 MMHG | WEIGHT: 162 LBS | HEIGHT: 69 IN | SYSTOLIC BLOOD PRESSURE: 118 MMHG

## 2021-01-01 VITALS — WEIGHT: 162 LBS | HEIGHT: 69 IN | BODY MASS INDEX: 23.99 KG/M2

## 2021-01-01 VITALS — BODY MASS INDEX: 24.44 KG/M2 | HEIGHT: 69 IN | WEIGHT: 165 LBS

## 2021-01-01 DIAGNOSIS — E11.69 HYPERLIPIDEMIA ASSOCIATED WITH TYPE 2 DIABETES MELLITUS (HCC): Chronic | ICD-10-CM

## 2021-01-01 DIAGNOSIS — R06.00 DYSPNEA, UNSPECIFIED TYPE: ICD-10-CM

## 2021-01-01 DIAGNOSIS — I50.9 CONGESTIVE HEART FAILURE, UNSPECIFIED HF CHRONICITY, UNSPECIFIED HEART FAILURE TYPE (HCC): ICD-10-CM

## 2021-01-01 DIAGNOSIS — I50.32 CHRONIC DIASTOLIC (CONGESTIVE) HEART FAILURE (HCC): Chronic | ICD-10-CM

## 2021-01-01 DIAGNOSIS — I50.22 CHRONIC SYSTOLIC (CONGESTIVE) HEART FAILURE (HCC): ICD-10-CM

## 2021-01-01 DIAGNOSIS — I50.31 ACUTE DIASTOLIC CHF (CONGESTIVE HEART FAILURE) (HCC): Primary | ICD-10-CM

## 2021-01-01 DIAGNOSIS — E83.42 HYPOMAGNESEMIA: Chronic | ICD-10-CM

## 2021-01-01 DIAGNOSIS — E11.22 TYPE 2 DIABETES MELLITUS WITH STAGE 4 CHRONIC KIDNEY DISEASE, WITHOUT LONG-TERM CURRENT USE OF INSULIN (HCC): Chronic | ICD-10-CM

## 2021-01-01 DIAGNOSIS — N18.4 CHRONIC KIDNEY DISEASE, STAGE IV (SEVERE) (HCC): ICD-10-CM

## 2021-01-01 DIAGNOSIS — D50.8 OTHER IRON DEFICIENCY ANEMIA: Chronic | ICD-10-CM

## 2021-01-01 DIAGNOSIS — E53.8 B12 DEFICIENCY: ICD-10-CM

## 2021-01-01 DIAGNOSIS — I50.9 HEART FAILURE, UNSPECIFIED HF CHRONICITY, UNSPECIFIED HEART FAILURE TYPE (HCC): ICD-10-CM

## 2021-01-01 DIAGNOSIS — N18.4 CHRONIC KIDNEY DISEASE, STAGE 4 (SEVERE) (HCC): ICD-10-CM

## 2021-01-01 DIAGNOSIS — M19.90 ARTHRITIS: Chronic | ICD-10-CM

## 2021-01-01 DIAGNOSIS — J44.1 COPD WITH EXACERBATION (HCC): ICD-10-CM

## 2021-01-01 DIAGNOSIS — M15.9 PRIMARY OSTEOARTHRITIS INVOLVING MULTIPLE JOINTS: ICD-10-CM

## 2021-01-01 DIAGNOSIS — N18.4 CHRONIC KIDNEY DISEASE, STAGE IV (SEVERE) (HCC): Primary | ICD-10-CM

## 2021-01-01 DIAGNOSIS — E53.8 B12 DEFICIENCY: Chronic | ICD-10-CM

## 2021-01-01 DIAGNOSIS — D64.9 ANEMIA, UNSPECIFIED TYPE: ICD-10-CM

## 2021-01-01 DIAGNOSIS — I73.9 PERIPHERAL VASCULAR DISEASE (HCC): Chronic | ICD-10-CM

## 2021-01-01 DIAGNOSIS — I73.9 CLAUDICATION (HCC): ICD-10-CM

## 2021-01-01 DIAGNOSIS — I50.22 CHRONIC SYSTOLIC (CONGESTIVE) HEART FAILURE (HCC): Chronic | ICD-10-CM

## 2021-01-01 DIAGNOSIS — I50.31 ACUTE DIASTOLIC CHF (CONGESTIVE HEART FAILURE) (HCC): ICD-10-CM

## 2021-01-01 DIAGNOSIS — C61 MALIGNANT TUMOR OF PROSTATE (HCC): Chronic | ICD-10-CM

## 2021-01-01 DIAGNOSIS — N18.4 CHRONIC KIDNEY DISEASE, STAGE IV (SEVERE) (HCC): Chronic | ICD-10-CM

## 2021-01-01 DIAGNOSIS — D50.8 OTHER IRON DEFICIENCY ANEMIA: ICD-10-CM

## 2021-01-01 DIAGNOSIS — I10 ESSENTIAL HYPERTENSION: Chronic | ICD-10-CM

## 2021-01-01 DIAGNOSIS — I50.21 ACUTE SYSTOLIC CHF (CONGESTIVE HEART FAILURE) (HCC): ICD-10-CM

## 2021-01-01 DIAGNOSIS — J44.1 COPD WITH EXACERBATION (HCC): Primary | ICD-10-CM

## 2021-01-01 DIAGNOSIS — I25.5 ISCHEMIC CARDIOMYOPATHY: ICD-10-CM

## 2021-01-01 DIAGNOSIS — D63.8 ANEMIA OF CHRONIC DISEASE: ICD-10-CM

## 2021-01-01 DIAGNOSIS — I25.5 ISCHEMIC CARDIOMYOPATHY: Primary | Chronic | ICD-10-CM

## 2021-01-01 DIAGNOSIS — N18.4 TYPE 2 DIABETES MELLITUS WITH STAGE 4 CHRONIC KIDNEY DISEASE, WITHOUT LONG-TERM CURRENT USE OF INSULIN (HCC): Primary | Chronic | ICD-10-CM

## 2021-01-01 DIAGNOSIS — D50.0 IRON DEFICIENCY ANEMIA DUE TO CHRONIC BLOOD LOSS: ICD-10-CM

## 2021-01-01 DIAGNOSIS — R79.89 ELEVATED BRAIN NATRIURETIC PEPTIDE (BNP) LEVEL: ICD-10-CM

## 2021-01-01 DIAGNOSIS — E78.5 HYPERLIPIDEMIA ASSOCIATED WITH TYPE 2 DIABETES MELLITUS (HCC): Chronic | ICD-10-CM

## 2021-01-01 DIAGNOSIS — N18.4 TYPE 2 DIABETES MELLITUS WITH STAGE 4 CHRONIC KIDNEY DISEASE, WITHOUT LONG-TERM CURRENT USE OF INSULIN (HCC): Chronic | ICD-10-CM

## 2021-01-01 DIAGNOSIS — G60.9 IDIOPATHIC PERIPHERAL NEUROPATHY: Chronic | ICD-10-CM

## 2021-01-01 DIAGNOSIS — N18.9 CHRONIC KIDNEY DISEASE, UNSPECIFIED CKD STAGE: ICD-10-CM

## 2021-01-01 DIAGNOSIS — E11.22 TYPE 2 DIABETES MELLITUS WITH STAGE 4 CHRONIC KIDNEY DISEASE, WITHOUT LONG-TERM CURRENT USE OF INSULIN (HCC): Primary | Chronic | ICD-10-CM

## 2021-01-01 DIAGNOSIS — K21.9 GASTROESOPHAGEAL REFLUX DISEASE WITHOUT ESOPHAGITIS: Chronic | ICD-10-CM

## 2021-01-01 DIAGNOSIS — Z23 IMMUNIZATION DUE: ICD-10-CM

## 2021-01-01 DIAGNOSIS — Z00.00 MEDICARE ANNUAL WELLNESS VISIT, INITIAL: Primary | ICD-10-CM

## 2021-01-01 DIAGNOSIS — I50.22 CHRONIC SYSTOLIC (CONGESTIVE) HEART FAILURE (HCC): Primary | ICD-10-CM

## 2021-01-01 DIAGNOSIS — I10 ESSENTIAL HYPERTENSION: ICD-10-CM

## 2021-01-01 DIAGNOSIS — R06.02 SOB (SHORTNESS OF BREATH): Primary | ICD-10-CM

## 2021-01-01 DIAGNOSIS — I73.9 CLAUDICATION (HCC): Chronic | ICD-10-CM

## 2021-01-01 DIAGNOSIS — Z23 NEED FOR SHINGLES VACCINE: ICD-10-CM

## 2021-01-01 DIAGNOSIS — E78.2 MIXED HYPERLIPIDEMIA: Chronic | ICD-10-CM

## 2021-01-01 LAB
25(OH)D3 SERPL-MCNC: 35.9 NG/ML (ref 30–100)
ALBUMIN SERPL-MCNC: 3.9 G/DL (ref 3.5–5)
ALBUMIN SERPL-MCNC: 4 G/DL (ref 3.5–5)
ALBUMIN SERPL-MCNC: 4.1 G/DL (ref 3.5–5.2)
ALBUMIN SERPL-MCNC: 4.2 G/DL (ref 3.5–5)
ALBUMIN SERPL-MCNC: 4.2 G/DL (ref 3.5–5)
ALBUMIN SERPL-MCNC: 4.3 G/DL (ref 3.5–5)
ALBUMIN SERPL-MCNC: 4.4 G/DL (ref 3.5–5)
ALBUMIN SERPL-MCNC: 4.5 G/DL (ref 3.5–5)
ALBUMIN UR-MCNC: 2.4 MG/DL
ALBUMIN/GLOB SERPL: 1.4 G/DL (ref 1.1–1.8)
ALBUMIN/GLOB SERPL: 1.5 G/DL (ref 1.1–1.8)
ALBUMIN/GLOB SERPL: 1.5 G/DL (ref 1.1–1.8)
ALBUMIN/GLOB SERPL: 1.8 G/DL
ALBUMIN/GLOB SERPL: 1.9 G/DL (ref 1.1–1.8)
ALBUMIN/GLOB SERPL: 1.9 G/DL (ref 1.1–1.8)
ALP SERPL-CCNC: 118 U/L (ref 39–117)
ALP SERPL-CCNC: 125 U/L (ref 38–126)
ALP SERPL-CCNC: 135 U/L (ref 38–126)
ALP SERPL-CCNC: 135 U/L (ref 38–126)
ALP SERPL-CCNC: 170 U/L (ref 38–126)
ALP SERPL-CCNC: 176 U/L (ref 38–126)
ALT SERPL W P-5'-P-CCNC: 11 U/L (ref 1–41)
ALT SERPL W P-5'-P-CCNC: 15 U/L
ALT SERPL W P-5'-P-CCNC: 17 U/L
ALT SERPL W P-5'-P-CCNC: 18 U/L
ALT SERPL W P-5'-P-CCNC: 18 U/L
ALT SERPL W P-5'-P-CCNC: 40 U/L
ANION GAP SERPL CALCULATED.3IONS-SCNC: 10 MMOL/L (ref 5–15)
ANION GAP SERPL CALCULATED.3IONS-SCNC: 10 MMOL/L (ref 5–15)
ANION GAP SERPL CALCULATED.3IONS-SCNC: 11 MMOL/L (ref 5–15)
ANION GAP SERPL CALCULATED.3IONS-SCNC: 12 MMOL/L (ref 5–15)
ANION GAP SERPL CALCULATED.3IONS-SCNC: 12 MMOL/L (ref 5–15)
ANION GAP SERPL CALCULATED.3IONS-SCNC: 13 MMOL/L (ref 5–15)
ANION GAP SERPL CALCULATED.3IONS-SCNC: 13 MMOL/L (ref 5–15)
ANION GAP SERPL CALCULATED.3IONS-SCNC: 9 MMOL/L (ref 5–15)
ANISOCYTOSIS BLD QL: ABNORMAL
ARTICHOKE IGE QN: 75 MG/DL (ref 0–100)
AST SERPL-CCNC: 14 U/L (ref 1–40)
AST SERPL-CCNC: 18 U/L (ref 17–59)
AST SERPL-CCNC: 22 U/L (ref 17–59)
AST SERPL-CCNC: 24 U/L (ref 17–59)
AST SERPL-CCNC: 24 U/L (ref 17–59)
AST SERPL-CCNC: 25 U/L (ref 17–59)
BASOPHILS # BLD AUTO: 0.05 10*3/MM3 (ref 0–0.2)
BASOPHILS # BLD AUTO: 0.06 10*3/MM3 (ref 0–0.2)
BASOPHILS # BLD AUTO: 0.07 10*3/MM3 (ref 0–0.2)
BASOPHILS # BLD AUTO: 0.07 10*3/MM3 (ref 0–0.2)
BASOPHILS # BLD MANUAL: 0.07 10*3/MM3 (ref 0–0.2)
BASOPHILS NFR BLD AUTO: 0.7 % (ref 0–1.5)
BASOPHILS NFR BLD AUTO: 0.8 % (ref 0–1.5)
BASOPHILS NFR BLD AUTO: 1 % (ref 0–1.5)
BILIRUB SERPL-MCNC: 0.4 MG/DL (ref 0–1.2)
BILIRUB SERPL-MCNC: 0.5 MG/DL (ref 0.2–1.3)
BILIRUB SERPL-MCNC: 0.5 MG/DL (ref 0.2–1.3)
BILIRUB SERPL-MCNC: 0.6 MG/DL (ref 0.2–1.3)
BILIRUB SERPL-MCNC: 0.6 MG/DL (ref 0.2–1.3)
BILIRUB SERPL-MCNC: 1.5 MG/DL (ref 0.2–1.3)
BNP SERPL-MCNC: 1130 PG/ML (ref 0–100)
BNP SERPL-MCNC: 2540 PG/ML (ref 0–100)
BNP SERPL-MCNC: 2800 PG/ML (ref 0–100)
BUN SERPL-MCNC: 48 MG/DL (ref 7–23)
BUN SERPL-MCNC: 53 MG/DL (ref 7–23)
BUN SERPL-MCNC: 57 MG/DL (ref 8–23)
BUN SERPL-MCNC: 59 MG/DL (ref 7–23)
BUN SERPL-MCNC: 63 MG/DL (ref 7–23)
BUN SERPL-MCNC: 70 MG/DL (ref 7–23)
BUN SERPL-MCNC: 70 MG/DL (ref 7–23)
BUN SERPL-MCNC: 81 MG/DL (ref 7–23)
BUN/CREAT SERPL: 21.5 (ref 7–25)
BUN/CREAT SERPL: 22.8 (ref 7–25)
BUN/CREAT SERPL: 23.2 (ref 7–25)
BUN/CREAT SERPL: 25 (ref 7–25)
BUN/CREAT SERPL: 26.4 (ref 7–25)
CALCIUM SPEC-SCNC: 9.1 MG/DL (ref 8.4–10.2)
CALCIUM SPEC-SCNC: 9.2 MG/DL (ref 8.2–9.6)
CALCIUM SPEC-SCNC: 9.2 MG/DL (ref 8.4–10.2)
CALCIUM SPEC-SCNC: 9.2 MG/DL (ref 8.4–10.2)
CALCIUM SPEC-SCNC: 9.4 MG/DL (ref 8.4–10.2)
CALCIUM SPEC-SCNC: 9.5 MG/DL (ref 8.4–10.2)
CALCIUM SPEC-SCNC: 9.5 MG/DL (ref 8.4–10.2)
CALCIUM SPEC-SCNC: 9.8 MG/DL (ref 8.4–10.2)
CHLORIDE SERPL-SCNC: 102 MMOL/L (ref 101–112)
CHLORIDE SERPL-SCNC: 102 MMOL/L (ref 98–107)
CHLORIDE SERPL-SCNC: 103 MMOL/L (ref 101–112)
CHLORIDE SERPL-SCNC: 105 MMOL/L (ref 101–112)
CHLORIDE SERPL-SCNC: 106 MMOL/L (ref 101–112)
CHLORIDE SERPL-SCNC: 107 MMOL/L (ref 101–112)
CHOLEST SERPL-MCNC: 210 MG/DL (ref 150–200)
CO2 SERPL-SCNC: 23 MMOL/L (ref 22–30)
CO2 SERPL-SCNC: 23 MMOL/L (ref 22–30)
CO2 SERPL-SCNC: 24 MMOL/L (ref 22–29)
CO2 SERPL-SCNC: 24 MMOL/L (ref 22–30)
CO2 SERPL-SCNC: 25 MMOL/L (ref 22–30)
CO2 SERPL-SCNC: 27 MMOL/L (ref 22–30)
CO2 SERPL-SCNC: 28 MMOL/L (ref 22–30)
CO2 SERPL-SCNC: 28 MMOL/L (ref 22–30)
CREAT SERPL-MCNC: 2.23 MG/DL (ref 0.7–1.3)
CREAT SERPL-MCNC: 2.47 MG/DL (ref 0.7–1.3)
CREAT SERPL-MCNC: 2.54 MG/DL (ref 0.7–1.3)
CREAT SERPL-MCNC: 2.65 MG/DL (ref 0.76–1.27)
CREAT SERPL-MCNC: 2.76 MG/DL (ref 0.7–1.3)
CREAT SERPL-MCNC: 2.8 MG/DL (ref 0.7–1.3)
CREAT SERPL-MCNC: 3.07 MG/DL (ref 0.7–1.3)
CREAT SERPL-MCNC: 3.26 MG/DL (ref 0.7–1.3)
CREAT UR-MCNC: 37.9 MG/DL
CREAT UR-MCNC: 85.9 MG/DL
CREAT UR-MCNC: 99 MG/DL
DEPRECATED RDW RBC AUTO: 50.5 FL (ref 37–54)
DEPRECATED RDW RBC AUTO: 52.1 FL (ref 37–54)
DEPRECATED RDW RBC AUTO: 53.1 FL (ref 37–54)
DEPRECATED RDW RBC AUTO: 53.3 FL (ref 37–54)
DEPRECATED RDW RBC AUTO: 54 FL (ref 37–54)
DEPRECATED RDW RBC AUTO: 54.3 FL (ref 37–54)
DEPRECATED RDW RBC AUTO: 56.1 FL (ref 37–54)
DEPRECATED RDW RBC AUTO: 57.9 FL (ref 37–54)
ELLIPTOCYTES BLD QL SMEAR: ABNORMAL
ELLIPTOCYTES BLD QL SMEAR: ABNORMAL
EOSINOPHIL # BLD AUTO: 0.06 10*3/MM3 (ref 0–0.4)
EOSINOPHIL # BLD AUTO: 0.1 10*3/MM3 (ref 0–0.4)
EOSINOPHIL # BLD AUTO: 0.12 10*3/MM3 (ref 0–0.4)
EOSINOPHIL # BLD AUTO: 0.14 10*3/MM3 (ref 0–0.4)
EOSINOPHIL # BLD MANUAL: 0.07 10*3/MM3 (ref 0–0.4)
EOSINOPHIL # BLD MANUAL: 0.14 10*3/MM3 (ref 0–0.4)
EOSINOPHIL # BLD MANUAL: 0.25 10*3/MM3 (ref 0–0.4)
EOSINOPHIL NFR BLD AUTO: 0.6 % (ref 0.3–6.2)
EOSINOPHIL NFR BLD AUTO: 1.1 % (ref 0.3–6.2)
EOSINOPHIL NFR BLD AUTO: 1.6 % (ref 0.3–6.2)
EOSINOPHIL NFR BLD AUTO: 1.9 % (ref 0.3–6.2)
EOSINOPHIL NFR BLD MANUAL: 1 % (ref 0.3–6.2)
EOSINOPHIL NFR BLD MANUAL: 2 % (ref 0.3–6.2)
EOSINOPHIL NFR BLD MANUAL: 4 % (ref 0.3–6.2)
ERYTHROCYTE [DISTWIDTH] IN BLOOD BY AUTOMATED COUNT: 14.6 % (ref 12.3–15.4)
ERYTHROCYTE [DISTWIDTH] IN BLOOD BY AUTOMATED COUNT: 15.1 % (ref 12.3–15.4)
ERYTHROCYTE [DISTWIDTH] IN BLOOD BY AUTOMATED COUNT: 15.2 % (ref 12.3–15.4)
ERYTHROCYTE [DISTWIDTH] IN BLOOD BY AUTOMATED COUNT: 15.2 % (ref 12.3–15.4)
ERYTHROCYTE [DISTWIDTH] IN BLOOD BY AUTOMATED COUNT: 15.3 % (ref 12.3–15.4)
ERYTHROCYTE [DISTWIDTH] IN BLOOD BY AUTOMATED COUNT: 15.4 % (ref 12.3–15.4)
ERYTHROCYTE [DISTWIDTH] IN BLOOD BY AUTOMATED COUNT: 15.6 % (ref 12.3–15.4)
ERYTHROCYTE [DISTWIDTH] IN BLOOD BY AUTOMATED COUNT: 17.1 % (ref 12.3–15.4)
FERRITIN SERPL-MCNC: 405 NG/ML (ref 30–400)
FERRITIN SERPL-MCNC: 520.8 NG/ML (ref 30–400)
FERRITIN SERPL-MCNC: 663 NG/ML (ref 30–400)
FERRITIN SERPL-MCNC: 786.1 NG/ML (ref 30–400)
FOLATE SERPL-MCNC: >20 NG/ML (ref 4.78–24.2)
GFR SERPL CREATININE-BSD FRML MDRD: 18 ML/MIN/1.73 (ref 42–98)
GFR SERPL CREATININE-BSD FRML MDRD: 19 ML/MIN/1.73 (ref 42–98)
GFR SERPL CREATININE-BSD FRML MDRD: 21 ML/MIN/1.73 (ref 42–98)
GFR SERPL CREATININE-BSD FRML MDRD: 22 ML/MIN/1.73 (ref 42–98)
GFR SERPL CREATININE-BSD FRML MDRD: 23 ML/MIN/1.73
GFR SERPL CREATININE-BSD FRML MDRD: 24 ML/MIN/1.73 (ref 42–98)
GFR SERPL CREATININE-BSD FRML MDRD: 25 ML/MIN/1.73 (ref 42–98)
GFR SERPL CREATININE-BSD FRML MDRD: 28 ML/MIN/1.73 (ref 42–98)
GLOBULIN UR ELPH-MCNC: 2.3 GM/DL
GLOBULIN UR ELPH-MCNC: 2.3 GM/DL (ref 2.3–3.5)
GLOBULIN UR ELPH-MCNC: 2.4 GM/DL (ref 2.3–3.5)
GLOBULIN UR ELPH-MCNC: 2.8 GM/DL (ref 2.3–3.5)
GLUCOSE SERPL-MCNC: 102 MG/DL (ref 70–99)
GLUCOSE SERPL-MCNC: 107 MG/DL (ref 70–99)
GLUCOSE SERPL-MCNC: 108 MG/DL (ref 70–99)
GLUCOSE SERPL-MCNC: 120 MG/DL (ref 70–99)
GLUCOSE SERPL-MCNC: 120 MG/DL (ref 70–99)
GLUCOSE SERPL-MCNC: 126 MG/DL (ref 70–99)
GLUCOSE SERPL-MCNC: 151 MG/DL (ref 65–99)
GLUCOSE SERPL-MCNC: 179 MG/DL (ref 70–99)
HBA1C MFR BLD: 5.73 % (ref 4.8–5.6)
HBA1C MFR BLD: 5.86 % (ref 4.8–5.6)
HCT VFR BLD AUTO: 36.3 % (ref 37.5–51)
HCT VFR BLD AUTO: 36.3 % (ref 37.5–51)
HCT VFR BLD AUTO: 36.6 % (ref 37.5–51)
HCT VFR BLD AUTO: 37.1 % (ref 37.5–51)
HCT VFR BLD AUTO: 38.5 % (ref 37.5–51)
HCT VFR BLD AUTO: 38.8 % (ref 37.5–51)
HCT VFR BLD AUTO: 38.9 % (ref 37.5–51)
HCT VFR BLD AUTO: 39 % (ref 37.5–51)
HCT VFR BLD AUTO: 42.1 % (ref 37.5–51)
HDLC SERPL-MCNC: 45 MG/DL (ref 40–59)
HGB BLD-MCNC: 11.2 G/DL (ref 13–17.7)
HGB BLD-MCNC: 11.5 G/DL (ref 13–17.7)
HGB BLD-MCNC: 11.8 G/DL (ref 13–17.7)
HGB BLD-MCNC: 12 G/DL (ref 13–17.7)
HGB BLD-MCNC: 12.1 G/DL (ref 13–17.7)
HGB BLD-MCNC: 12.2 G/DL (ref 13–17.7)
HGB BLD-MCNC: 12.4 G/DL (ref 13–17.7)
HGB BLD-MCNC: 12.8 G/DL (ref 13–17.7)
HGB BLD-MCNC: 13.4 G/DL (ref 13–17.7)
HOLD SPECIMEN: NORMAL
IMM GRANULOCYTES # BLD AUTO: 0.08 10*3/MM3 (ref 0–0.05)
IMM GRANULOCYTES # BLD AUTO: 0.11 10*3/MM3 (ref 0–0.05)
IMM GRANULOCYTES NFR BLD AUTO: 0.8 % (ref 0–0.5)
IMM GRANULOCYTES NFR BLD AUTO: 1.2 % (ref 0–0.5)
IRON 24H UR-MRATE: 52 MCG/DL (ref 59–158)
IRON 24H UR-MRATE: 62 MCG/DL (ref 59–158)
IRON 24H UR-MRATE: 74 MCG/DL (ref 59–158)
IRON 24H UR-MRATE: 83 MCG/DL (ref 59–158)
IRON SATN MFR SERPL: 15 % (ref 20–50)
IRON SATN MFR SERPL: 20 % (ref 20–50)
IRON SATN MFR SERPL: 25 % (ref 20–50)
IRON SATN MFR SERPL: 27 % (ref 20–50)
LDLC SERPL CALC-MCNC: 104 MG/DL
LDLC/HDLC SERPL: 2.06 {RATIO} (ref 0–3.55)
LYMPHOCYTES # BLD AUTO: 0.47 10*3/MM3 (ref 0.7–3.1)
LYMPHOCYTES # BLD AUTO: 0.5 10*3/MM3 (ref 0.7–3.1)
LYMPHOCYTES # BLD AUTO: 0.75 10*3/MM3 (ref 0.7–3.1)
LYMPHOCYTES # BLD AUTO: 0.8 10*3/MM3 (ref 0.7–3.1)
LYMPHOCYTES # BLD MANUAL: 0.47 10*3/MM3 (ref 0.7–3.1)
LYMPHOCYTES # BLD MANUAL: 0.5 10*3/MM3 (ref 0.7–3.1)
LYMPHOCYTES # BLD MANUAL: 0.63 10*3/MM3 (ref 0.7–3.1)
LYMPHOCYTES # BLD MANUAL: 1.17 10*3/MM3 (ref 0.7–3.1)
LYMPHOCYTES NFR BLD AUTO: 10.5 % (ref 19.6–45.3)
LYMPHOCYTES NFR BLD AUTO: 5.3 % (ref 19.6–45.3)
LYMPHOCYTES NFR BLD AUTO: 6.3 % (ref 19.6–45.3)
LYMPHOCYTES NFR BLD AUTO: 8 % (ref 19.6–45.3)
LYMPHOCYTES NFR BLD MANUAL: 12 % (ref 5–12)
LYMPHOCYTES NFR BLD MANUAL: 17 % (ref 19.6–45.3)
LYMPHOCYTES NFR BLD MANUAL: 5 % (ref 19.6–45.3)
LYMPHOCYTES NFR BLD MANUAL: 5 % (ref 5–12)
LYMPHOCYTES NFR BLD MANUAL: 8 % (ref 19.6–45.3)
LYMPHOCYTES NFR BLD MANUAL: 8 % (ref 5–12)
LYMPHOCYTES NFR BLD MANUAL: 9 % (ref 19.6–45.3)
LYMPHOCYTES NFR BLD MANUAL: 9 % (ref 5–12)
MAGNESIUM SERPL-MCNC: 1.9 MG/DL (ref 1.6–2.3)
MAGNESIUM SERPL-MCNC: 2 MG/DL (ref 1.6–2.3)
MAGNESIUM SERPL-MCNC: 2.1 MG/DL (ref 1.6–2.3)
MAGNESIUM SERPL-MCNC: 2.2 MG/DL (ref 1.6–2.3)
MCH RBC QN AUTO: 30.4 PG (ref 26.6–33)
MCH RBC QN AUTO: 30.4 PG (ref 26.6–33)
MCH RBC QN AUTO: 30.5 PG (ref 26.6–33)
MCH RBC QN AUTO: 30.6 PG (ref 26.6–33)
MCH RBC QN AUTO: 31.5 PG (ref 26.6–33)
MCH RBC QN AUTO: 31.7 PG (ref 26.6–33)
MCH RBC QN AUTO: 32.1 PG (ref 26.6–33)
MCH RBC QN AUTO: 32.7 PG (ref 26.6–33)
MCHC RBC AUTO-ENTMCNC: 30.6 G/DL (ref 31.5–35.7)
MCHC RBC AUTO-ENTMCNC: 30.8 G/DL (ref 31.5–35.7)
MCHC RBC AUTO-ENTMCNC: 31.2 G/DL (ref 31.5–35.7)
MCHC RBC AUTO-ENTMCNC: 31.8 G/DL (ref 31.5–35.7)
MCHC RBC AUTO-ENTMCNC: 32.2 G/DL (ref 31.5–35.7)
MCHC RBC AUTO-ENTMCNC: 32.5 G/DL (ref 31.5–35.7)
MCHC RBC AUTO-ENTMCNC: 32.9 G/DL (ref 31.5–35.7)
MCHC RBC AUTO-ENTMCNC: 33.6 G/DL (ref 31.5–35.7)
MCV RBC AUTO: 101.6 FL (ref 79–97)
MCV RBC AUTO: 94.3 FL (ref 79–97)
MCV RBC AUTO: 95.5 FL (ref 79–97)
MCV RBC AUTO: 95.5 FL (ref 79–97)
MCV RBC AUTO: 97.7 FL (ref 79–97)
MCV RBC AUTO: 99.2 FL (ref 79–97)
MCV RBC AUTO: 99.2 FL (ref 79–97)
MCV RBC AUTO: 99.5 FL (ref 79–97)
MICROALBUMIN/CREAT UR: 63.3 MG/G
MONOCYTES # BLD AUTO: 0.47 10*3/MM3 (ref 0.1–0.9)
MONOCYTES # BLD AUTO: 0.56 10*3/MM3 (ref 0.1–0.9)
MONOCYTES # BLD AUTO: 0.6 10*3/MM3 (ref 0.1–0.9)
MONOCYTES # BLD AUTO: 0.62 10*3/MM3 (ref 0.1–0.9)
MONOCYTES # BLD AUTO: 0.62 10*3/MM3 (ref 0.1–0.9)
MONOCYTES # BLD AUTO: 0.75 10*3/MM3 (ref 0.1–0.9)
MONOCYTES # BLD AUTO: 0.77 10*3/MM3 (ref 0.1–0.9)
MONOCYTES # BLD AUTO: 0.77 10*3/MM3 (ref 0.1–0.9)
MONOCYTES NFR BLD AUTO: 10.1 % (ref 5–12)
MONOCYTES NFR BLD AUTO: 6.6 % (ref 5–12)
MONOCYTES NFR BLD AUTO: 8 % (ref 5–12)
MONOCYTES NFR BLD AUTO: 8.1 % (ref 5–12)
NEUTROPHILS # BLD AUTO: 4.73 10*3/MM3 (ref 1.7–7)
NEUTROPHILS # BLD AUTO: 5.04 10*3/MM3 (ref 1.7–7)
NEUTROPHILS # BLD AUTO: 5.57 10*3/MM3 (ref 1.7–7)
NEUTROPHILS # BLD AUTO: 8.53 10*3/MM3 (ref 1.7–7)
NEUTROPHILS NFR BLD AUTO: 5.86 10*3/MM3 (ref 1.7–7)
NEUTROPHILS NFR BLD AUTO: 6.26 10*3/MM3 (ref 1.7–7)
NEUTROPHILS NFR BLD AUTO: 7.81 10*3/MM3 (ref 1.7–7)
NEUTROPHILS NFR BLD AUTO: 77 % (ref 42.7–76)
NEUTROPHILS NFR BLD AUTO: 8 10*3/MM3 (ref 1.7–7)
NEUTROPHILS NFR BLD AUTO: 82.8 % (ref 42.7–76)
NEUTROPHILS NFR BLD AUTO: 83.1 % (ref 42.7–76)
NEUTROPHILS NFR BLD AUTO: 84.1 % (ref 42.7–76)
NEUTROPHILS NFR BLD MANUAL: 73 % (ref 42.7–76)
NEUTROPHILS NFR BLD MANUAL: 76 % (ref 42.7–76)
NEUTROPHILS NFR BLD MANUAL: 80 % (ref 42.7–76)
NEUTROPHILS NFR BLD MANUAL: 90 % (ref 42.7–76)
NRBC BLD AUTO-RTO: 0 /100 WBC (ref 0–0.2)
NRBC BLD AUTO-RTO: 0 /100 WBC (ref 0–0.2)
PHOSPHATE SERPL-MCNC: 3.8 MG/DL (ref 2.5–4.5)
PHOSPHATE SERPL-MCNC: 4 MG/DL (ref 2.5–4.5)
PHOSPHATE SERPL-MCNC: 4 MG/DL (ref 2.5–4.5)
PHOSPHATE SERPL-MCNC: 4.2 MG/DL (ref 2.5–4.5)
PLATELET # BLD AUTO: 163 10*3/MM3 (ref 140–450)
PLATELET # BLD AUTO: 178 10*3/MM3 (ref 140–450)
PLATELET # BLD AUTO: 195 10*3/MM3 (ref 140–450)
PLATELET # BLD AUTO: 197 10*3/MM3 (ref 140–450)
PLATELET # BLD AUTO: 198 10*3/MM3 (ref 140–450)
PLATELET # BLD AUTO: 204 10*3/MM3 (ref 140–450)
PMV BLD AUTO: 10.6 FL (ref 6–12)
PMV BLD AUTO: 10.7 FL (ref 6–12)
PMV BLD AUTO: 10.8 FL (ref 6–12)
PMV BLD AUTO: 10.8 FL (ref 6–12)
PMV BLD AUTO: 10.9 FL (ref 6–12)
PMV BLD AUTO: 11.1 FL (ref 6–12)
PMV BLD AUTO: 11.5 FL (ref 6–12)
PMV BLD AUTO: 11.8 FL (ref 6–12)
POTASSIUM SERPL-SCNC: 4.4 MMOL/L (ref 3.4–5)
POTASSIUM SERPL-SCNC: 4.7 MMOL/L (ref 3.4–5)
POTASSIUM SERPL-SCNC: 4.7 MMOL/L (ref 3.4–5)
POTASSIUM SERPL-SCNC: 4.9 MMOL/L (ref 3.4–5)
POTASSIUM SERPL-SCNC: 4.9 MMOL/L (ref 3.5–5.2)
POTASSIUM SERPL-SCNC: 5 MMOL/L (ref 3.4–5)
POTASSIUM SERPL-SCNC: 5.1 MMOL/L (ref 3.4–5)
POTASSIUM SERPL-SCNC: 5.4 MMOL/L (ref 3.4–5)
PROT SERPL-MCNC: 6.4 G/DL (ref 6–8.5)
PROT SERPL-MCNC: 6.7 G/DL (ref 6.3–8.6)
PROT SERPL-MCNC: 6.8 G/DL (ref 6.3–8.6)
PROT SERPL-MCNC: 6.9 G/DL (ref 6.3–8.6)
PROT SERPL-MCNC: 7 G/DL (ref 6.3–8.6)
PROT SERPL-MCNC: 7 G/DL (ref 6.3–8.6)
PROT UR-MCNC: 10 MG/DL
PROT UR-MCNC: 15 MG/DL
PROT/CREAT UR: 116.4 MG/G CREA (ref 0–200)
PROT/CREAT UR: 151.5 MG/G CREA (ref 0–200)
PSA SERPL-MCNC: 0.08 NG/ML (ref 0–4)
PTH-INTACT SERPL-MCNC: 127 PG/ML (ref 15–65)
PTH-INTACT SERPL-MCNC: 272 PG/ML (ref 15–65)
RBC # BLD AUTO: 3.68 10*6/MM3 (ref 4.14–5.8)
RBC # BLD AUTO: 3.8 10*6/MM3 (ref 4.14–5.8)
RBC # BLD AUTO: 3.82 10*6/MM3 (ref 4.14–5.8)
RBC # BLD AUTO: 3.85 10*6/MM3 (ref 4.14–5.8)
RBC # BLD AUTO: 3.92 10*6/MM3 (ref 4.14–5.8)
RBC # BLD AUTO: 3.93 10*6/MM3 (ref 4.14–5.8)
RBC # BLD AUTO: 3.94 10*6/MM3 (ref 4.14–5.8)
RBC # BLD AUTO: 4.41 10*6/MM3 (ref 4.14–5.8)
SMALL PLATELETS BLD QL SMEAR: ADEQUATE
SODIUM SERPL-SCNC: 139 MMOL/L (ref 136–145)
SODIUM SERPL-SCNC: 139 MMOL/L (ref 137–145)
SODIUM SERPL-SCNC: 140 MMOL/L (ref 137–145)
SODIUM SERPL-SCNC: 141 MMOL/L (ref 137–145)
SODIUM SERPL-SCNC: 142 MMOL/L (ref 137–145)
SODIUM SERPL-SCNC: 144 MMOL/L (ref 137–145)
TIBC SERPL-MCNC: 301 MCG/DL (ref 298–536)
TIBC SERPL-MCNC: 302 MCG/DL (ref 298–536)
TIBC SERPL-MCNC: 307 MCG/DL (ref 298–536)
TIBC SERPL-MCNC: 349 MCG/DL (ref 298–536)
TRANSFERRIN SERPL-MCNC: 202 MG/DL (ref 200–360)
TRANSFERRIN SERPL-MCNC: 203 MG/DL (ref 200–360)
TRANSFERRIN SERPL-MCNC: 206 MG/DL (ref 200–360)
TRANSFERRIN SERPL-MCNC: 234 MG/DL (ref 200–360)
TRIGL SERPL-MCNC: 361 MG/DL
TSH SERPL DL<=0.05 MIU/L-ACNC: 2 UIU/ML (ref 0.27–4.2)
URATE SERPL-MCNC: 9.4 MG/DL (ref 3.5–8.5)
VIT B12 BLD-MCNC: 552 PG/ML (ref 211–946)
VIT B12 BLD-MCNC: 590 PG/ML (ref 211–946)
VIT B12 BLD-MCNC: 743 PG/ML (ref 211–946)
VLDLC SERPL-MCNC: 61 MG/DL (ref 5–40)
WBC # BLD AUTO: 6.22 10*3/MM3 (ref 3.4–10.8)
WBC # BLD AUTO: 6.91 10*3/MM3 (ref 3.4–10.8)
WBC # BLD AUTO: 6.96 10*3/MM3 (ref 3.4–10.8)
WBC # BLD AUTO: 7.61 10*3/MM3 (ref 3.4–10.8)
WBC # BLD AUTO: 9.43 10*3/MM3 (ref 3.4–10.8)
WBC # BLD AUTO: 9.48 10*3/MM3 (ref 3.4–10.8)
WBC # BLD AUTO: 9.51 10*3/MM3 (ref 3.4–10.8)
WBC MORPH BLD: NORMAL
WBC NRBC COR # BLD: 7.52 10*3/MM3 (ref 3.4–10.8)

## 2021-01-01 PROCEDURE — G2025 DIS SITE TELE SVCS RHC/FQHC: HCPCS | Performed by: INTERNAL MEDICINE

## 2021-01-01 PROCEDURE — 85025 COMPLETE CBC W/AUTO DIFF WBC: CPT

## 2021-01-01 PROCEDURE — 85025 COMPLETE CBC W/AUTO DIFF WBC: CPT | Performed by: INTERNAL MEDICINE

## 2021-01-01 PROCEDURE — 84466 ASSAY OF TRANSFERRIN: CPT

## 2021-01-01 PROCEDURE — 90662 IIV NO PRSV INCREASED AG IM: CPT | Performed by: INTERNAL MEDICINE

## 2021-01-01 PROCEDURE — 84466 ASSAY OF TRANSFERRIN: CPT | Performed by: INTERNAL MEDICINE

## 2021-01-01 PROCEDURE — 83970 ASSAY OF PARATHORMONE: CPT | Performed by: INTERNAL MEDICINE

## 2021-01-01 PROCEDURE — 83735 ASSAY OF MAGNESIUM: CPT | Performed by: INTERNAL MEDICINE

## 2021-01-01 PROCEDURE — 83880 ASSAY OF NATRIURETIC PEPTIDE: CPT | Performed by: INTERNAL MEDICINE

## 2021-01-01 PROCEDURE — 36415 COLL VENOUS BLD VENIPUNCTURE: CPT | Performed by: INTERNAL MEDICINE

## 2021-01-01 PROCEDURE — 85025 COMPLETE CBC W/AUTO DIFF WBC: CPT | Performed by: NURSE PRACTITIONER

## 2021-01-01 PROCEDURE — 82570 ASSAY OF URINE CREATININE: CPT | Performed by: INTERNAL MEDICINE

## 2021-01-01 PROCEDURE — 80053 COMPREHEN METABOLIC PANEL: CPT | Performed by: INTERNAL MEDICINE

## 2021-01-01 PROCEDURE — 84156 ASSAY OF PROTEIN URINE: CPT | Performed by: INTERNAL MEDICINE

## 2021-01-01 PROCEDURE — 83540 ASSAY OF IRON: CPT

## 2021-01-01 PROCEDURE — 83540 ASSAY OF IRON: CPT | Performed by: INTERNAL MEDICINE

## 2021-01-01 PROCEDURE — 82570 ASSAY OF URINE CREATININE: CPT | Performed by: STUDENT IN AN ORGANIZED HEALTH CARE EDUCATION/TRAINING PROGRAM

## 2021-01-01 PROCEDURE — 83721 ASSAY OF BLOOD LIPOPROTEIN: CPT | Performed by: INTERNAL MEDICINE

## 2021-01-01 PROCEDURE — 83036 HEMOGLOBIN GLYCOSYLATED A1C: CPT | Performed by: INTERNAL MEDICINE

## 2021-01-01 PROCEDURE — 84443 ASSAY THYROID STIM HORMONE: CPT | Performed by: INTERNAL MEDICINE

## 2021-01-01 PROCEDURE — 85018 HEMOGLOBIN: CPT | Performed by: INTERNAL MEDICINE

## 2021-01-01 PROCEDURE — 84156 ASSAY OF PROTEIN URINE: CPT | Performed by: STUDENT IN AN ORGANIZED HEALTH CARE EDUCATION/TRAINING PROGRAM

## 2021-01-01 PROCEDURE — 82607 VITAMIN B-12: CPT | Performed by: INTERNAL MEDICINE

## 2021-01-01 PROCEDURE — 84466 ASSAY OF TRANSFERRIN: CPT | Performed by: NURSE PRACTITIONER

## 2021-01-01 PROCEDURE — G0463 HOSPITAL OUTPT CLINIC VISIT: HCPCS | Performed by: NURSE PRACTITIONER

## 2021-01-01 PROCEDURE — 99212 OFFICE O/P EST SF 10 MIN: CPT | Performed by: NURSE PRACTITIONER

## 2021-01-01 PROCEDURE — G0438 PPPS, INITIAL VISIT: HCPCS | Performed by: INTERNAL MEDICINE

## 2021-01-01 PROCEDURE — 99215 OFFICE O/P EST HI 40 MIN: CPT | Performed by: INTERNAL MEDICINE

## 2021-01-01 PROCEDURE — 84550 ASSAY OF BLOOD/URIC ACID: CPT | Performed by: INTERNAL MEDICINE

## 2021-01-01 PROCEDURE — 83540 ASSAY OF IRON: CPT | Performed by: NURSE PRACTITIONER

## 2021-01-01 PROCEDURE — 82728 ASSAY OF FERRITIN: CPT | Performed by: INTERNAL MEDICINE

## 2021-01-01 PROCEDURE — 82043 UR ALBUMIN QUANTITATIVE: CPT | Performed by: INTERNAL MEDICINE

## 2021-01-01 PROCEDURE — 82306 VITAMIN D 25 HYDROXY: CPT | Performed by: INTERNAL MEDICINE

## 2021-01-01 PROCEDURE — 85014 HEMATOCRIT: CPT | Performed by: INTERNAL MEDICINE

## 2021-01-01 PROCEDURE — G0008 ADMIN INFLUENZA VIRUS VAC: HCPCS | Performed by: INTERNAL MEDICINE

## 2021-01-01 PROCEDURE — 80069 RENAL FUNCTION PANEL: CPT | Performed by: INTERNAL MEDICINE

## 2021-01-01 PROCEDURE — 84100 ASSAY OF PHOSPHORUS: CPT | Performed by: INTERNAL MEDICINE

## 2021-01-01 PROCEDURE — 84153 ASSAY OF PSA TOTAL: CPT | Performed by: INTERNAL MEDICINE

## 2021-01-01 PROCEDURE — 83970 ASSAY OF PARATHORMONE: CPT | Performed by: STUDENT IN AN ORGANIZED HEALTH CARE EDUCATION/TRAINING PROGRAM

## 2021-01-01 PROCEDURE — 80053 COMPREHEN METABOLIC PANEL: CPT

## 2021-01-01 PROCEDURE — 82728 ASSAY OF FERRITIN: CPT

## 2021-01-01 PROCEDURE — 82728 ASSAY OF FERRITIN: CPT | Performed by: NURSE PRACTITIONER

## 2021-01-01 PROCEDURE — 82607 VITAMIN B-12: CPT

## 2021-01-01 PROCEDURE — 80061 LIPID PANEL: CPT | Performed by: INTERNAL MEDICINE

## 2021-01-01 PROCEDURE — 82746 ASSAY OF FOLIC ACID SERUM: CPT

## 2021-01-01 RX ORDER — TRAMADOL HYDROCHLORIDE 50 MG/1
TABLET ORAL
Qty: 50 TABLET | Refills: 0 | Status: SHIPPED | OUTPATIENT
Start: 2021-01-01

## 2021-01-01 RX ORDER — FOLIC ACID 1 MG/1
1 TABLET ORAL DAILY
Qty: 30 TABLET | Refills: 5 | Status: SHIPPED | OUTPATIENT
Start: 2021-01-01

## 2021-01-01 RX ORDER — PREDNISONE 1 MG/1
5 TABLET ORAL DAILY
COMMUNITY
Start: 2021-01-01

## 2021-01-01 RX ORDER — TORSEMIDE 10 MG/1
10 TABLET ORAL DAILY
Qty: 30 TABLET | Refills: 11 | COMMUNITY
Start: 2021-01-01 | End: 2021-01-01 | Stop reason: SDUPTHER

## 2021-01-01 RX ORDER — IPRATROPIUM BROMIDE AND ALBUTEROL SULFATE 2.5; .5 MG/3ML; MG/3ML
3 SOLUTION RESPIRATORY (INHALATION) EVERY 4 HOURS PRN
Qty: 360 ML | Refills: 1 | Status: SHIPPED | OUTPATIENT
Start: 2021-01-01 | End: 2022-01-01

## 2021-01-01 RX ORDER — GABAPENTIN 600 MG/1
900 TABLET ORAL NIGHTLY
Qty: 45 TABLET | Refills: 5 | Status: SHIPPED | OUTPATIENT
Start: 2021-01-01

## 2021-01-01 RX ORDER — ROSUVASTATIN CALCIUM 10 MG/1
10 TABLET, COATED ORAL DAILY
Qty: 30 TABLET | Refills: 11 | Status: SHIPPED | OUTPATIENT
Start: 2021-01-01

## 2021-01-01 RX ORDER — NITROGLYCERIN 0.4 MG/1
0.4 TABLET SUBLINGUAL
COMMUNITY
Start: 2020-11-24

## 2021-01-01 RX ORDER — IRBESARTAN 75 MG/1
75 TABLET ORAL NIGHTLY
Qty: 30 TABLET | Refills: 11 | Status: SHIPPED | OUTPATIENT
Start: 2021-01-01 | End: 2022-11-30

## 2021-01-01 RX ORDER — AMLODIPINE BESYLATE 2.5 MG/1
2.5 TABLET ORAL DAILY
Qty: 30 TABLET | Refills: 5 | Status: SHIPPED | OUTPATIENT
Start: 2021-01-01

## 2021-01-01 RX ORDER — ALBUTEROL SULFATE 90 UG/1
2 AEROSOL, METERED RESPIRATORY (INHALATION) EVERY 4 HOURS PRN
Qty: 18 G | Refills: 11 | Status: SHIPPED | OUTPATIENT
Start: 2021-01-01

## 2021-01-01 RX ORDER — OMEPRAZOLE 40 MG/1
40 CAPSULE, DELAYED RELEASE ORAL EVERY MORNING
Qty: 30 CAPSULE | Refills: 5 | Status: SHIPPED | OUTPATIENT
Start: 2021-01-01 | End: 2021-01-01

## 2021-01-01 RX ORDER — OMEPRAZOLE 40 MG/1
40 CAPSULE, DELAYED RELEASE ORAL EVERY MORNING
Qty: 30 CAPSULE | Refills: 5 | Status: SHIPPED | OUTPATIENT
Start: 2021-01-01

## 2021-01-01 RX ORDER — AMOXICILLIN AND CLAVULANATE POTASSIUM 500; 125 MG/1; MG/1
500 TABLET, FILM COATED ORAL
COMMUNITY
Start: 2021-01-01 | End: 2021-01-01

## 2021-01-01 RX ORDER — IRBESARTAN 75 MG/1
75 TABLET ORAL NIGHTLY
COMMUNITY
Start: 2020-12-03 | End: 2021-01-01 | Stop reason: SDUPTHER

## 2021-01-01 RX ORDER — GABAPENTIN 600 MG/1
600 TABLET ORAL NIGHTLY
Qty: 30 TABLET | Refills: 5 | Status: SHIPPED | OUTPATIENT
Start: 2021-01-01 | End: 2021-01-01 | Stop reason: SDUPTHER

## 2021-01-01 RX ORDER — TORSEMIDE 10 MG/1
10 TABLET ORAL DAILY
COMMUNITY
Start: 2020-12-04 | End: 2021-01-01

## 2021-01-01 RX ORDER — PREDNISONE 10 MG/1
TABLET ORAL
Qty: 20 TABLET | Refills: 0 | Status: SHIPPED | OUTPATIENT
Start: 2021-01-01 | End: 2021-01-01

## 2021-01-01 RX ORDER — TORSEMIDE 10 MG/1
TABLET ORAL
Qty: 60 TABLET | Refills: 11 | Status: SHIPPED | OUTPATIENT
Start: 2021-01-01

## 2021-01-01 RX ORDER — AMLODIPINE BESYLATE 5 MG/1
TABLET ORAL
Qty: 30 TABLET | Refills: 11 | Status: SHIPPED | OUTPATIENT
Start: 2021-01-01 | End: 2021-01-01 | Stop reason: DRUGHIGH

## 2021-01-08 DIAGNOSIS — I73.9 CLAUDICATION (HCC): ICD-10-CM

## 2021-01-08 RX ORDER — PREDNISONE 1 MG/1
5 TABLET ORAL DAILY
Qty: 30 TABLET | Refills: 11 | Status: SHIPPED | OUTPATIENT
Start: 2021-01-08 | End: 2021-01-01 | Stop reason: ALTCHOICE

## 2021-01-08 RX ORDER — GABAPENTIN 600 MG/1
600 TABLET ORAL NIGHTLY
Qty: 30 TABLET | Refills: 5 | Status: SHIPPED | OUTPATIENT
Start: 2021-01-08 | End: 2021-01-01

## 2021-01-18 DIAGNOSIS — D50.0 IRON DEFICIENCY ANEMIA DUE TO CHRONIC BLOOD LOSS: Primary | ICD-10-CM

## 2021-01-22 ENCOUNTER — LAB (OUTPATIENT)
Dept: ONCOLOGY | Facility: HOSPITAL | Age: 86
End: 2021-01-22

## 2021-01-22 ENCOUNTER — OFFICE VISIT (OUTPATIENT)
Dept: ONCOLOGY | Facility: CLINIC | Age: 86
End: 2021-01-22

## 2021-01-22 VITALS
RESPIRATION RATE: 20 BRPM | BODY MASS INDEX: 24.44 KG/M2 | HEIGHT: 69 IN | SYSTOLIC BLOOD PRESSURE: 121 MMHG | HEART RATE: 63 BPM | DIASTOLIC BLOOD PRESSURE: 57 MMHG | WEIGHT: 165 LBS | TEMPERATURE: 97.4 F

## 2021-01-22 DIAGNOSIS — E53.8 B12 DEFICIENCY: Chronic | ICD-10-CM

## 2021-01-22 DIAGNOSIS — D50.0 IRON DEFICIENCY ANEMIA DUE TO CHRONIC BLOOD LOSS: ICD-10-CM

## 2021-01-22 DIAGNOSIS — N18.4 CHRONIC KIDNEY DISEASE, STAGE 4 (SEVERE) (HCC): ICD-10-CM

## 2021-01-22 LAB
BASOPHILS # BLD AUTO: 0.06 10*3/MM3 (ref 0–0.2)
BASOPHILS NFR BLD AUTO: 0.7 % (ref 0–1.5)
DEPRECATED RDW RBC AUTO: 67.7 FL (ref 37–54)
EOSINOPHIL # BLD AUTO: 0.1 10*3/MM3 (ref 0–0.4)
EOSINOPHIL NFR BLD AUTO: 1.2 % (ref 0.3–6.2)
ERYTHROCYTE [DISTWIDTH] IN BLOOD BY AUTOMATED COUNT: 20.3 % (ref 12.3–15.4)
FERRITIN SERPL-MCNC: 549.6 NG/ML (ref 30–400)
FOLATE SERPL-MCNC: >20 NG/ML (ref 4.78–24.2)
HCT VFR BLD AUTO: 37.8 % (ref 37.5–51)
HGB BLD-MCNC: 12.3 G/DL (ref 13–17.7)
IMM GRANULOCYTES # BLD AUTO: 0.09 10*3/MM3 (ref 0–0.05)
IMM GRANULOCYTES NFR BLD AUTO: 1.1 % (ref 0–0.5)
IRON 24H UR-MRATE: 63 MCG/DL (ref 59–158)
IRON SATN MFR SERPL: 21 % (ref 20–50)
LYMPHOCYTES # BLD AUTO: 0.43 10*3/MM3 (ref 0.7–3.1)
LYMPHOCYTES NFR BLD AUTO: 5.4 % (ref 19.6–45.3)
MCH RBC QN AUTO: 29.5 PG (ref 26.6–33)
MCHC RBC AUTO-ENTMCNC: 32.5 G/DL (ref 31.5–35.7)
MCV RBC AUTO: 90.6 FL (ref 79–97)
MONOCYTES # BLD AUTO: 0.62 10*3/MM3 (ref 0.1–0.9)
MONOCYTES NFR BLD AUTO: 7.7 % (ref 5–12)
NEUTROPHILS NFR BLD AUTO: 6.72 10*3/MM3 (ref 1.7–7)
NEUTROPHILS NFR BLD AUTO: 83.9 % (ref 42.7–76)
NRBC BLD AUTO-RTO: 0 /100 WBC (ref 0–0.2)
PLATELET # BLD AUTO: 164 10*3/MM3 (ref 140–450)
PMV BLD AUTO: 10.7 FL (ref 6–12)
RBC # BLD AUTO: 4.17 10*6/MM3 (ref 4.14–5.8)
TIBC SERPL-MCNC: 294 MCG/DL (ref 298–536)
TRANSFERRIN SERPL-MCNC: 197 MG/DL (ref 200–360)
VIT B12 BLD-MCNC: 561 PG/ML (ref 211–946)
WBC # BLD AUTO: 8.02 10*3/MM3 (ref 3.4–10.8)

## 2021-01-22 PROCEDURE — 84466 ASSAY OF TRANSFERRIN: CPT

## 2021-01-22 PROCEDURE — 82728 ASSAY OF FERRITIN: CPT

## 2021-01-22 PROCEDURE — 99212 OFFICE O/P EST SF 10 MIN: CPT | Performed by: NURSE PRACTITIONER

## 2021-01-22 PROCEDURE — 85025 COMPLETE CBC W/AUTO DIFF WBC: CPT

## 2021-01-22 PROCEDURE — G0463 HOSPITAL OUTPT CLINIC VISIT: HCPCS | Performed by: NURSE PRACTITIONER

## 2021-01-22 PROCEDURE — 82607 VITAMIN B-12: CPT

## 2021-01-22 PROCEDURE — 82746 ASSAY OF FOLIC ACID SERUM: CPT

## 2021-01-22 PROCEDURE — 83540 ASSAY OF IRON: CPT

## 2021-01-22 RX ORDER — FOLIC ACID 1 MG/1
1 TABLET ORAL DAILY
Qty: 30 TABLET | Refills: 3 | Status: SHIPPED | OUTPATIENT
Start: 2021-01-22 | End: 2021-01-01

## 2021-01-25 NOTE — PROGRESS NOTES
"DATE OF VISIT: 1/22/2021      REASON FOR VISIT:  Follow-up for iron deficiency anemia secondary to GI blood loss and CKD      HISTORY OF PRESENT ILLNESS: 89-year-old male with a past medical history significant for history of peripheral vascular disease status post femoropopliteal bypass, prostate cancer status post seed in 2001, history of coronary artery disease status post CABG was initially seen in consultation on December 20, 2017 for evaluation of iron deficiency anemia and not able to tolerate borderline. He has received IV iron in past; most recently received 2 doses on 11/23/2020 and 12/11/2020.   Patient states he was recently in hospital with acute CHF and RADHA      Past Medical History, Past Surgical History, Social History, Family History have been reviewed and are without significant changes except as mentioned.    Review of Systems   A comprehensive 14 point review of systems was performed and was negative except as mentioned.  +fatigue    Medications:  The current medication list was reviewed in the EMR    ALLERGIES:    Allergies   Allergen Reactions   • Bacitracin Rash   • Formaldehyde Rash   • Framycetin Rash     All Fragrances   • Neomycin Rash   • Nickel Rash       Objective      Vitals:    01/22/21 1034   BP: 121/57   Pulse: 63   Resp: 20   Temp: 97.4 °F (36.3 °C)   TempSrc: Temporal   Weight: 74.8 kg (165 lb)   Height: 176.5 cm (69.49\")   PainSc:   2   PainLoc: Generalized     Current Status 1/22/2021   ECOG score 2       Physical Exam    GENERAL:  Not in any distress.     HEENT:  Normocephalic, Atraumatic.Mild Conjunctival pallor. No icterus.  No Facial Asymmetry noted.     NECK:  No adenopathy. No JVD.     RESPIRATORY:  Fair air entry bilateral. No rhonchi or wheezing.     CARDIOVASCULAR:  S1, S2. Regular rate and rhythm. No murmur or gallop appreciated.     ABDOMEN:  Soft, obese, nontender. Bowel sounds present in all four quadrants.  No organomegaly appreciated.     EXTREMITIES:  No edema.No " Calf Tenderness.     NEUROLOGIC:  Alert, awake and oriented ×3.   SKIN : chronic skin changes from sun exposure. No skin lesions identified    RECENT LABS:  Glucose   Date Value Ref Range Status   12/21/2020 111 (H) 70 - 99 mg/dL Final     Sodium   Date Value Ref Range Status   12/21/2020 137 137 - 145 mmol/L Final   11/28/2019 141 136 - 145 mmol/L Final     Potassium   Date Value Ref Range Status   12/21/2020 5.1 (H) 3.4 - 5.0 mmol/L Final   09/19/2020 4.4 3.3 - 5.0 mmol/L Final     CO2   Date Value Ref Range Status   12/21/2020 28.0 22.0 - 30.0 mmol/L Final     Chloride   Date Value Ref Range Status   12/21/2020 101 101 - 112 mmol/L Final   11/28/2019 105 98 - 107 mmol/L Final     Anion Gap   Date Value Ref Range Status   12/21/2020 8.0 5.0 - 15.0 mmol/L Final     Creatinine   Date Value Ref Range Status   12/21/2020 2.18 (H) 0.70 - 1.30 mg/dL Final   11/28/2019 2.6 (H) 0.7 - 1.3 mg/dL Final     Comment:     **The MDRD GFR formula is valid only for ages 18-70.    GFR will not be reported for individuals aging <18 or >70.     BUN   Date Value Ref Range Status   12/21/2020 53 (H) 7 - 23 mg/dL Final   11/28/2019 51 (H) 7 - 18 mg/dL Final     BUN/Creatinine Ratio   Date Value Ref Range Status   12/21/2020 24.3 7.0 - 25.0 Final     Calcium   Date Value Ref Range Status   12/21/2020 9.1 8.4 - 10.2 mg/dL Final   11/30/2020 9.1 8.1 - 10.2 mg/dL Final     eGFR Non  Amer   Date Value Ref Range Status   12/21/2020 28 (L) 42 - 98 mL/min/1.73 Final     Alkaline Phosphatase   Date Value Ref Range Status   12/21/2020 153 (H) 38 - 126 U/L Final   11/28/2019 133 (H) 46 - 116 U/L Final     Total Protein   Date Value Ref Range Status   12/21/2020 6.8 6.3 - 8.6 g/dL Final   11/28/2019 7.1 6.4 - 8.2 g/dL Final     ALT (SGPT)   Date Value Ref Range Status   12/21/2020 19 <=50 U/L Final   11/28/2019 23 16 - 63 U/L Final     AST (SGOT)   Date Value Ref Range Status   12/21/2020 27 17 - 59 U/L Final   11/28/2019 69 (H) 15 - 37 U/L  Final     Total Bilirubin   Date Value Ref Range Status   12/21/2020 0.5 0.2 - 1.3 mg/dL Final   11/28/2019 0.5 0.2 - 1.0 mg/dL Final     Albumin   Date Value Ref Range Status   12/21/2020 4.10 3.50 - 5.00 g/dL Final   11/28/2019 3.8 3.4 - 5.0 g/dL Final     Globulin   Date Value Ref Range Status   12/21/2020 2.7 2.3 - 3.5 gm/dL Final     Lab Results   Component Value Date    WBC 8.02 01/22/2021    HGB 12.3 (L) 01/22/2021    HCT 37.8 01/22/2021    MCV 90.6 01/22/2021     01/22/2021     Lab Results   Component Value Date    NEUTROABS 6.72 01/22/2021    IRON 63 01/22/2021    IRON 64 12/01/2020    IRON 29 (L) 11/11/2020    TIBC 294 (L) 01/22/2021    TIBC 399 12/01/2020    TIBC 429 11/11/2020    LABIRON 21 01/22/2021    LABIRON 16 (L) 12/01/2020    LABIRON 7 (L) 11/11/2020    FERRITIN 549.60 (H) 01/22/2021    FERRITIN 517 (H) 12/01/2020    FERRITIN 170.00 06/30/2020    LODYSUPR80 561 01/22/2021    ZYQBKIKQ64 456 12/01/2020    XFCVYUGA17 597 11/11/2020    LNUNRXQD91 587 11/11/2020    FOLATE >20.00 01/22/2021    FOLATE 32.00 (H) 12/01/2020    FOLATE >20.00 11/11/2020     No results found for: , LABCA2, AFPTM, HCGQUANT, , CHROMGRNA, 2ZBCN19UBI, CEA, REFLABREPO      PATHOLOGY:  * Cannot find OR log *         RADIOLOGY DATA :  No radiology results for the last 7 days        Assessment/Plan       1. Anemia; multiple reasons; component of Iron deficiency anemia secondary to possible GI bleed and component of CKD stage NIKA;  -Most recent dosing of IV iron was 11/23/2020 and 12/11/2020.  -labs reviewed today shows Hgb is 12.3 with normal iron studies and normal B-12 and folate levels.  -will recheck again in 3 mos.  -We have discussed referral to GI in past and he has refused to go.     2.  Chronic kidney disease stage IV; pt is following with [facility].     3.  Prostate cancer status post seed implant. Being followed by different provider, not managed by our clinic.                PHQ-9 Total  Score: 0     Sy SOFYA Florez reports a pain score of 2.  Given his pain assessment as noted, treatment options were discussed and the following options were decided upon as a follow-up plan to address the patient's pain: continuation of current treatment plan for pain.         Char Soto, APRN  1/25/2021  09:54 CST        Part of this note may be an electronic transcription/translation of spoken language to printed text using the Dragon Dictation System.          CC:

## 2021-02-15 ENCOUNTER — TRANSCRIBE ORDERS (OUTPATIENT)
Dept: GENERAL RADIOLOGY | Facility: CLINIC | Age: 86
End: 2021-02-15

## 2021-02-15 ENCOUNTER — LAB (OUTPATIENT)
Dept: LAB | Facility: OTHER | Age: 86
End: 2021-02-15

## 2021-02-15 DIAGNOSIS — N18.4 CHRONIC KIDNEY DISEASE, STAGE IV (SEVERE) (HCC): ICD-10-CM

## 2021-02-15 DIAGNOSIS — D64.9 ANEMIA, UNSPECIFIED TYPE: ICD-10-CM

## 2021-02-15 DIAGNOSIS — I50.9 HEART FAILURE, UNSPECIFIED HF CHRONICITY, UNSPECIFIED HEART FAILURE TYPE (HCC): ICD-10-CM

## 2021-02-15 DIAGNOSIS — N18.4 CHRONIC KIDNEY DISEASE, STAGE IV (SEVERE) (HCC): Primary | ICD-10-CM

## 2021-02-15 LAB
ALBUMIN SERPL-MCNC: 4.3 G/DL (ref 3.5–5)
ALBUMIN/GLOB SERPL: 1.5 G/DL (ref 1.1–1.8)
ALP SERPL-CCNC: 162 U/L (ref 38–126)
ALT SERPL W P-5'-P-CCNC: 21 U/L
ANION GAP SERPL CALCULATED.3IONS-SCNC: 8 MMOL/L (ref 5–15)
ANISOCYTOSIS BLD QL: ABNORMAL
AST SERPL-CCNC: 25 U/L (ref 17–59)
BILIRUB SERPL-MCNC: 0.4 MG/DL (ref 0.2–1.3)
BNP SERPL-MCNC: 977 PG/ML (ref 0–100)
BUN SERPL-MCNC: 60 MG/DL (ref 7–23)
BUN/CREAT SERPL: 25 (ref 7–25)
CALCIUM SPEC-SCNC: 9.1 MG/DL (ref 8.4–10.2)
CHLORIDE SERPL-SCNC: 103 MMOL/L (ref 101–112)
CO2 SERPL-SCNC: 28 MMOL/L (ref 22–30)
CREAT SERPL-MCNC: 2.4 MG/DL (ref 0.7–1.3)
DEPRECATED RDW RBC AUTO: 66.1 FL (ref 37–54)
ELLIPTOCYTES BLD QL SMEAR: ABNORMAL
EOSINOPHIL # BLD MANUAL: 0.08 10*3/MM3 (ref 0–0.4)
EOSINOPHIL NFR BLD MANUAL: 1 % (ref 0.3–6.2)
ERYTHROCYTE [DISTWIDTH] IN BLOOD BY AUTOMATED COUNT: 19.4 % (ref 12.3–15.4)
GFR SERPL CREATININE-BSD FRML MDRD: 25 ML/MIN/1.73 (ref 42–98)
GLOBULIN UR ELPH-MCNC: 2.8 GM/DL (ref 2.3–3.5)
GLUCOSE SERPL-MCNC: 129 MG/DL (ref 70–99)
HCT VFR BLD AUTO: 39.5 % (ref 37.5–51)
HGB BLD-MCNC: 12.3 G/DL (ref 13–17.7)
LYMPHOCYTES # BLD MANUAL: 0.61 10*3/MM3 (ref 0.7–3.1)
LYMPHOCYTES NFR BLD MANUAL: 7 % (ref 5–12)
LYMPHOCYTES NFR BLD MANUAL: 8 % (ref 19.6–45.3)
MAGNESIUM SERPL-MCNC: 2.2 MG/DL (ref 1.6–2.3)
MCH RBC QN AUTO: 29.6 PG (ref 26.6–33)
MCHC RBC AUTO-ENTMCNC: 31.1 G/DL (ref 31.5–35.7)
MCV RBC AUTO: 95 FL (ref 79–97)
MONOCYTES # BLD AUTO: 0.53 10*3/MM3 (ref 0.1–0.9)
NEUTROPHILS # BLD AUTO: 6.4 10*3/MM3 (ref 1.7–7)
NEUTROPHILS NFR BLD MANUAL: 84 % (ref 42.7–76)
PLATELET # BLD AUTO: 178 10*3/MM3 (ref 140–450)
PMV BLD AUTO: 10.9 FL (ref 6–12)
POTASSIUM SERPL-SCNC: 4.8 MMOL/L (ref 3.4–5)
PROT SERPL-MCNC: 7.1 G/DL (ref 6.3–8.6)
RBC # BLD AUTO: 4.16 10*6/MM3 (ref 4.14–5.8)
SMALL PLATELETS BLD QL SMEAR: ADEQUATE
SODIUM SERPL-SCNC: 139 MMOL/L (ref 137–145)
WBC # BLD AUTO: 7.62 10*3/MM3 (ref 3.4–10.8)
WBC MORPH BLD: NORMAL

## 2021-02-15 PROCEDURE — 85025 COMPLETE CBC W/AUTO DIFF WBC: CPT | Performed by: INTERNAL MEDICINE

## 2021-02-15 PROCEDURE — 80053 COMPREHEN METABOLIC PANEL: CPT | Performed by: INTERNAL MEDICINE

## 2021-02-15 PROCEDURE — 36415 COLL VENOUS BLD VENIPUNCTURE: CPT | Performed by: INTERNAL MEDICINE

## 2021-02-15 PROCEDURE — 83880 ASSAY OF NATRIURETIC PEPTIDE: CPT | Performed by: INTERNAL MEDICINE

## 2021-02-15 PROCEDURE — 83735 ASSAY OF MAGNESIUM: CPT | Performed by: INTERNAL MEDICINE

## 2021-03-23 NOTE — PROGRESS NOTES
"You have chosen to receive care through a telephone visit. Do you consent to use a telephone visit for your medical care today? Yes   Total visit time: 32 minutes.     Chief Complaint  Follow-up (4 month )    Subjective          History of Present Illness     Sy Florez is our 89-year-old male patient who receives care via telephone visit for 4-month follow up on stage 4 CKD, CAD, PAD, chronic venous insufficiency, hypertension, high cholesterol, iron deficiency anemia, systolic and diastolic CHF with EF 45%, and extensive peripheral vascular disease, status post femoropopliteal bypass right leg 1997 and left leg 2007.  He had right total hip arthroplasty by Dr. Sahu 08/18/2020.  He has sitters staying with him in the home part time.      Chronic stage IV kidney disease followed by [facility]. Renal function slightly declined with creatinine 2.54..       He continues to struggle with multifactorial claudication symptoms in bilateral lower extremities.  Denies recent falls.      Patient follows with Dr. Mccord for iron deficiency anemia secondary to GI blood loss and CKD. CBC unremarkable with significant improvement in hemoglobin 13.4.  Iron levels acceptable. Patient had IV iron  11/23/2020 and 12/11/2020.  Denies evidence of GI blood loss.      He has been fully vaccinated for COVID-19.       The patient's relevant past medical, surgical, and social history was reviewed in Epic.  Lab results are reviewed with the patient today. CBC unremarkable with significant improvement in hemoglobin 13.4.  Fasting glucose 108.  A1c 5.86.  Normal liver function.  Magnesium and B-12 at goal.  Renal function slightly declined.  Iron levels acceptable.  LDL cholesterol slightly above goal at 104.         Objective   Vital Signs:   Ht 175.3 cm (69\")   Wt 74.8 kg (165 lb)   BMI 24.37 kg/m²   No blood pressure obtained today with this telephone visit due to coronavirus pandemic.    Physical Exam   Result " Review :     CMP    CMP 12/21/20 2/15/21 3/16/21   Glucose 111 (A) 129 (A) 108 (A)   BUN 53 (A) 60 (A) 59 (A)   Creatinine 2.18 (A) 2.40 (A) 2.54 (A)   eGFR Non African Am 28 (A) 25 (A) 24 (A)   Sodium 137 139 139   Potassium 5.1 (A) 4.8 5.0   Chloride 101 103 102   Calcium 9.1 9.1 9.5   Albumin 4.10 4.30 4.20   Total Bilirubin 0.5 0.4 0.6   Alkaline Phosphatase 153 (A) 162 (A) 176 (A)   AST (SGOT) 27 25 24   ALT (SGPT) 19 21 18   (A) Abnormal value            CBC w/diff    CBC w/Diff 1/22/21 2/15/21 3/16/21   WBC 8.02 7.62 6.96   RBC 4.17 4.16 4.41   Hemoglobin 12.3 (A) 12.3 (A) 13.4   Hematocrit 37.8 39.5 42.1   MCV 90.6 95.0 95.5   MCH 29.5 29.6 30.4   MCHC 32.5 31.1 (A) 31.8   RDW 20.3 (A) 19.4 (A) 17.1 (A)   Platelets 164 178 178   Neutrophil Rel % 83.9 (A)     Immature Granulocyte Rel % 1.1 (A)     Lymphocyte Rel % 5.4 (A)     Monocyte Rel % 7.7     Eosinophil Rel % 1.2     Basophil Rel % 0.7     (A) Abnormal value            Lipid Panel    Lipid Panel 6/30/20 11/11/20 3/16/21   Total Cholesterol   210 (A)   Triglycerides   361 (A)   HDL Cholesterol   45   VLDL Cholesterol   61 (A)   LDL Cholesterol  114 (A) 102 (A) 104 (A)   LDL/HDL Ratio   2.06   (A) Abnormal value            TSH    TSH 9/17/20 11/11/20   TSH 1.02 1.960           A1C Last 3 Results    HGBA1C Last 3 Results 6/30/20 11/11/20 3/16/21   Hemoglobin A1C 5.87 (A) 5.83 (A) 5.86 (A)   (A) Abnormal value            Data reviewed: Consultant notes Dr. Lyn, nephrology          Assessment and Plan    Diagnoses and all orders for this visit:    1. Type 2 diabetes mellitus with stage 4 chronic kidney disease, without long-term current use of insulin (CMS/HCC) (Primary)  -     CBC Auto Differential; Future  -     Comprehensive Metabolic Panel; Future  -     Hemoglobin A1c; Future  -     Microalbumin / Creatinine Urine Ratio - Urine, Clean Catch; Future  -     TSH; Future    2. Hyperlipidemia associated with type 2 diabetes mellitus (CMS/HCC)  -     LDL  Cholesterol, Direct; Future    3. Essential hypertension  -     Comprehensive Metabolic Panel; Future    4. Claudication (CMS/[facility])    5. Chronic systolic (congestive) heart failure (CMS/[facility]) - EF 45% per ECHO 7/2020.  Dr. Mireles  -     BNP; Future    6. Chronic diastolic (congestive) heart failure (CMS/HCC)    7. Peripheral vascular disease (CMS/[facility])    8. B12 deficiency  -     Vitamin B12; Future    9. Gastroesophageal reflux disease without esophagitis    10. Hypomagnesemia - h/o  -     Magnesium; Future    11. Chronic kidney disease, stage IV (severe) (CMS/[facility])  -     PTH, Intact; Future  -     Vitamin D 25 Hydroxy; Future  -     Phosphorus; Future    12. Other iron deficiency anemia  -     Ferritin; Future  -     Iron Profile; Future    13. Malignant tumor of prostate (CMS/[facility])    14. Idiopathic peripheral neuropathy           Continue the current vascular risk factor modifications including Crestor and antiplatelet agents.  Continue BP medications.  Monitor BP episodically at rest and notify me if not consistently at goal.     His diabetes continues to be diet controlled and is stable.  Continue principles of diabetic diet.    Mild CHF symptoms are stable.  We will continue to monitor BNP as a guideline, but understand it will be chronically elevated due to his stage IV CKD.  More diuretics would help his dependent edema, but likely further worsening renal function.    Continue to avoid NSAIDs and other nephrotoxic drugs.  He has been reluctant to see nephrology, but saw Dr. Lyn in December.  Continue to use diuretics judiciously.  Check a vitamin D, phosphorus and parathyroid hormone with next set of labs.    Continue follow up visits as scheduled with Dr. Mccord to address iron deficiency anemia secondary to GI blood loss and CKD.  Denies evidence of GI blood loss.      Continue management plan for chronic venous insufficiency including maintaining a goal body weight, elevating lower extremities when not  ambulating, sodium restriction and compliance with wearing the compression stockings.      Return in six months for follow up with fasting labs one week prior.     Scribed for Dr. Rodriguez by Fransisca Costascwinnie.     Follow Up   Return in about 6 months (around 9/23/2021) for Next scheduled follow up - labs 1 week prior.  Patient was given instructions and counseling regarding his condition or for health maintenance advice. Please see specific information pulled into the AVS if appropriate.

## 2021-04-27 NOTE — PROGRESS NOTES
"DATE OF VISIT: 4/27/2021      REASON FOR VISIT:  Follow-up for iron deficiency anemia secondary to GI blood loss and CKD        HISTORY OF PRESENT ILLNESS: 89-year-old male with a past medical history significant for history of peripheral vascular disease status post femoropopliteal bypass, prostate cancer status post seed in 2001, history of coronary artery disease status post CABG was initially seen in consultation on December 20, 2017 for evaluation of iron deficiency anemia and not able to tolerate borderline. He has received IV iron in past; most recently received 2 doses on 11/23/2020 and 12/11/2020.   Patient states he is seeing nephrology tomorrow.         Past Medical History, Past Surgical History, Social History, Family History have been reviewed and are without significant changes except as mentioned.    Review of Systems   A comprehensive 14 point review of systems was performed and was negative except as mentioned.  +fatigue and weakness  Medications:  The current medication list was reviewed in the EMR    ALLERGIES:    Allergies   Allergen Reactions   • Bacitracin Rash   • Formaldehyde Rash   • Framycetin Rash     All Fragrances   • Neomycin Rash   • Nickel Rash       Objective      Vitals:    04/27/21 1034   BP: 116/59   Pulse: 50   Resp: 18   Temp: 97.2 °F (36.2 °C)   TempSrc: Temporal   SpO2: 97%   Weight: 76.4 kg (168 lb 6.4 oz)   Height: 175.3 cm (69.02\")   PainSc: 0-No pain     Current Status 4/27/2021   ECOG score 0       Physical Exam  GENERAL:  Not in any distress.     HEENT:  Normocephalic, Atraumatic.Mild Conjunctival pallor. No icterus.  No Facial Asymmetry noted.     NECK:  No adenopathy. No JVD.     RESPIRATORY:  Fair air entry bilateral. No rhonchi or wheezing.     CARDIOVASCULAR:  S1, S2. Regular rate and rhythm. No murmur or gallop appreciated.     ABDOMEN:  Soft, obese, nontender. Bowel sounds present in all four quadrants.  No organomegaly appreciated.     EXTREMITIES:  No edema.No " Calf Tenderness.     NEUROLOGIC:  Alert, awake and oriented ×3.   SKIN : chronic skin changes from sun exposure. No skin lesions identified    RECENT LABS:  Glucose   Date Value Ref Range Status   04/21/2021 107 (H) 70 - 99 mg/dL Final     Sodium   Date Value Ref Range Status   04/21/2021 141 137 - 145 mmol/L Final   11/28/2019 141 136 - 145 mmol/L Final     Potassium   Date Value Ref Range Status   04/21/2021 4.7 3.4 - 5.0 mmol/L Final   09/19/2020 4.4 3.3 - 5.0 mmol/L Final     CO2   Date Value Ref Range Status   04/21/2021 27.0 22.0 - 30.0 mmol/L Final     Chloride   Date Value Ref Range Status   04/21/2021 103 101 - 112 mmol/L Final   11/28/2019 105 98 - 107 mmol/L Final     Anion Gap   Date Value Ref Range Status   04/21/2021 11.0 5.0 - 15.0 mmol/L Final     Creatinine   Date Value Ref Range Status   04/21/2021 2.23 (H) 0.70 - 1.30 mg/dL Final   11/28/2019 2.6 (H) 0.7 - 1.3 mg/dL Final     Comment:     **The MDRD GFR formula is valid only for ages 18-70.    GFR will not be reported for individuals aging <18 or >70.     BUN   Date Value Ref Range Status   04/21/2021 48 (H) 7 - 23 mg/dL Final   11/28/2019 51 (H) 7 - 18 mg/dL Final     BUN/Creatinine Ratio   Date Value Ref Range Status   04/21/2021 21.5 7.0 - 25.0 Final     Calcium   Date Value Ref Range Status   04/21/2021 9.5 8.4 - 10.2 mg/dL Final   11/30/2020 9.1 8.1 - 10.2 mg/dL Final     eGFR Non  Amer   Date Value Ref Range Status   04/21/2021 28 (L) 42 - 98 mL/min/1.73 Final     Alkaline Phosphatase   Date Value Ref Range Status   04/21/2021 170 (H) 38 - 126 U/L Final   11/28/2019 133 (H) 46 - 116 U/L Final     Total Protein   Date Value Ref Range Status   04/21/2021 6.8 6.3 - 8.6 g/dL Final   11/28/2019 7.1 6.4 - 8.2 g/dL Final     ALT (SGPT)   Date Value Ref Range Status   04/21/2021 17 <=50 U/L Final   11/28/2019 23 16 - 63 U/L Final     AST (SGOT)   Date Value Ref Range Status   04/21/2021 24 17 - 59 U/L Final   11/28/2019 69 (H) 15 - 37 U/L  Final     Total Bilirubin   Date Value Ref Range Status   04/21/2021 1.5 (H) 0.2 - 1.3 mg/dL Final   11/28/2019 0.5 0.2 - 1.0 mg/dL Final     Albumin   Date Value Ref Range Status   04/21/2021 4.00 3.50 - 5.00 g/dL Final   11/28/2019 3.8 3.4 - 5.0 g/dL Final     Globulin   Date Value Ref Range Status   04/21/2021 2.8 2.3 - 3.5 gm/dL Final     Lab Results   Component Value Date    WBC 9.51 04/27/2021    HGB 11.8 (L) 04/27/2021    HCT 36.3 (L) 04/27/2021    MCV 94.3 04/27/2021     04/27/2021     Lab Results   Component Value Date    NEUTROABS 8.00 (H) 04/27/2021    IRON 74 04/27/2021    IRON 62 03/16/2021    IRON 63 01/22/2021    TIBC 301 04/27/2021    TIBC 307 03/16/2021    TIBC 294 (L) 01/22/2021    LABIRON 25 04/27/2021    LABIRON 20 03/16/2021    LABIRON 21 01/22/2021    FERRITIN 786.10 (H) 04/27/2021    FERRITIN 663.00 (H) 03/16/2021    FERRITIN 549.60 (H) 01/22/2021    FTRUPBVY20 743 03/16/2021    TJKHPMCS50 561 01/22/2021    [id number] 456 12/01/2020    FOLATE >20.00 01/22/2021    FOLATE 32.00 (H) 12/01/2020    FOLATE >20.00 11/11/2020     No results found for: , LABCA2, AFPTM, HCGQUANT, , CHROMGRNA, [id number], CEA, REFLABREPO      RADIOLOGY DATA :  No radiology results for the last 7 days        Assessment/Plan        Anemia; multiple reasons; component of Iron deficiency anemia secondary to possible GI bleed and component of CKD stage IV;  -Most recent dosing of IV iron was 11/23/2020 and 12/11/2020.  -labs reviewed today shows Hgb is 11.8 but iron saturation is still good at 25%; I believe the degree of anemia we are seeing now is related to his CKD; he is seeing nephrology tomorrow. Creatinine level today is 2.23  Will plan to recheck his labs again in 4 mos.      PHQ-9 Total Score:       Sy Folrez reports a pain score of 0.  Given his pain assessment as noted, treatment options were discussed and the following options were decided upon as a follow-up plan to address the  patient's pain: continuation of current treatment plan for pain.         Char Soto, APRN  4/27/2021  13:33 CDT        Part of this note may be an electronic transcription/translation of spoken language to printed text using the Dragon Dictation System.          CC:

## 2021-04-27 NOTE — PATIENT INSTRUCTIONS
Iron Deficiency Anemia, Adult  Iron deficiency anemia is when you do not have enough red blood cells or hemoglobin in your blood. This happens because you have too little iron in your body. Hemoglobin carries oxygen to parts of the body. Anemia can cause your body to not get enough oxygen.  What are the causes?  · Not eating enough foods that have iron in them.  · The body not being able to take in iron well.  · Needing more iron due to pregnancy or heavy menstrual periods, for females.  · Cancer.  · Bleeding in the bowels.  · Many blood draws.  What increases the risk?  · Being pregnant.  · Being a teenage girl going through a growth spurt.  What are the signs or symptoms?  · Pale skin, lips, and nails.  · Weakness, dizziness, and getting tired easily.  · Headache.  · Feeling like you cannot breathe well when moving (shortness of breath).  · Cold hands and feet.  · Fast heartbeat or a heartbeat that is not regular.  · Feeling grouchy (irritable) or breathing fast. These are more common in very bad anemia.  Mild anemia may not cause any symptoms.  How is this treated?  This condition is treated by finding out why you do not have enough iron and then getting more iron. It may include:  · Adding foods to your diet that have a lot of iron.  · Taking iron pills (supplements). If you are pregnant or breastfeeding, you may need to take extra iron. Your diet often does not provide the amount of iron that you need.  · Getting more vitamin C in your diet. Vitamin C helps your body take in iron. You may need to take iron pills with a glass of orange juice or vitamin C pills.  · Medicines to make heavy menstrual periods lighter.  · Surgery.  You may need blood tests to see if treatment is working. If the treatment does not seem to be working, you may need more tests.  Follow these instructions at home:  Medicines  · Take over-the-counter and prescription medicines only as told by your doctor. This includes iron pills and  vitamins.  ? Take iron pills when your stomach is empty. If you cannot handle this, take them with food.  ? Do not drink milk or take antacids at the same time as your iron pills.  ? Iron pills may turn your poop (stool)black.  · If you cannot handle taking iron pills by mouth, ask your doctor about getting iron through:  ? An IV tube.  ? A shot (injection) into a muscle.  Eating and drinking    · Talk with your doctor before changing the foods you eat. He or she may tell you to eat foods that have a lot of iron, such as:  ? Liver.  ? Low-fat (lean) beef.  ? Breads and cereals that have iron added to them.  ? Eggs.  ? Dried fruit.  ? Dark green, leafy vegetables.  · Eat fresh fruits and vegetables that are high in vitamin C. They help your body use iron. Foods with a lot of vitamin C include:  ? Oranges.  ? Peppers.  ? Tomatoes.  ? Mangoes.  · Drink enough fluid to keep your pee (urine) pale yellow.  Managing constipation  If you are taking iron pills, they may cause trouble pooping (constipation). To prevent or treat trouble pooping, you may need to:  · Take over-the-counter or prescription medicines.  · Eat foods that are high in fiber. These include beans, whole grains, and fresh fruits and vegetables.  · Limit foods that are high in fat and sugar. These include fried or sweet foods.  General instructions  · Return to your normal activities as told by your doctor. Ask your doctor what activities are safe for you.  · Keep yourself clean, and keep things clean around you.  · Keep all follow-up visits as told by your doctor. This is important.  Contact a doctor if:  · You feel like you may vomit (nauseous), or you vomit.  · You feel weak.  · You are sweating for no reason.  · You have trouble pooping, such as:  ? Pooping less than 3 times a week.  ? Straining to poop.  ? Having poop that is hard, dry, or larger than normal.  ? Feeling full or bloated.  ? Pain in the lower belly.  ? Not feeling better after  pooping.  Get help right away if:  · You pass out (faint).  · You have chest pain.  · You have trouble breathing that:  ? Is very bad.  ? Gets worse with physical activity.  · You have a fast heartbeat, or a heartbeat that does not feel regular.  · You get light-headed when getting up from sitting or lying down.  These symptoms may be an emergency. Do not wait to see if the symptoms will go away. Get medical help right away. Call your local emergency services (911 in the [place] Do not drive yourself to the hospital.  Summary  · Iron deficiency anemia is when you have too little iron in your body.  · This condition is treated by finding out why you do not have enough iron in your body and then getting more iron.  · Take over-the-counter and prescription medicines only as told by your doctor.  · Eat fresh fruits and vegetables that are high in vitamin C.  · Get help right away if you cannot breathe well.  This information is not intended to replace advice given to you by your health care provider. Make sure you discuss any questions you have with your health care provider.  Document Revised: 08/25/2020 Document Reviewed: 08/25/2020  [employer] Patient Education © 2021 [employer]

## 2021-05-04 PROBLEM — M16.0 PRIMARY OSTEOARTHRITIS OF BOTH HIPS: Status: ACTIVE | Noted: 2020-03-13

## 2021-05-04 PROBLEM — S52.023A OLECRANON FRACTURE: Status: ACTIVE | Noted: 2021-01-01

## 2021-05-04 PROBLEM — I20.89 EXERTIONAL ANGINA: Status: ACTIVE | Noted: 2021-01-01

## 2021-05-04 PROBLEM — I73.9 PERIPHERAL VASCULAR DISEASE OF EXTREMITY WITH CLAUDICATION (HCC): Status: ACTIVE | Noted: 2017-08-09

## 2021-05-04 PROBLEM — R31.9 HEMATURIA: Status: ACTIVE | Noted: 2021-01-01

## 2021-05-04 PROBLEM — S82.143A TIBIAL PLATEAU FRACTURE: Status: ACTIVE | Noted: 2021-01-01

## 2021-05-04 PROBLEM — Z98.890 H/O ARTHROSCOPIC KNEE SURGERY: Status: ACTIVE | Noted: 2021-01-01

## 2021-05-04 PROBLEM — Z87.01 HISTORY OF COMMUNITY ACQUIRED PNEUMONIA: Status: ACTIVE | Noted: 2020-12-03

## 2021-05-04 PROBLEM — Z86.39 HISTORY OF HYPERLIPIDEMIA: Status: ACTIVE | Noted: 2017-08-09

## 2021-05-04 PROBLEM — R93.1 LEFT VENTRICULAR EJECTION FRACTION LESS THAN 40 PERCENT: Status: ACTIVE | Noted: 2020-12-03

## 2021-05-04 PROBLEM — G89.29 CHRONIC PAIN: Status: ACTIVE | Noted: 2021-01-01

## 2021-05-04 PROBLEM — I20.8 EXERTIONAL ANGINA (HCC): Status: ACTIVE | Noted: 2021-01-01

## 2021-05-04 PROBLEM — D63.8 ANEMIA OF CHRONIC DISEASE: Status: ACTIVE | Noted: 2017-10-04

## 2021-05-04 PROBLEM — I21.4 NSTEMI (NON-ST ELEVATED MYOCARDIAL INFARCTION) (HCC): Status: ACTIVE | Noted: 2020-09-17

## 2021-05-04 PROBLEM — E86.0 LUETSCHER'S SYNDROME: Status: ACTIVE | Noted: 2021-01-01

## 2021-05-04 PROBLEM — N17.9 AKI (ACUTE KIDNEY INJURY) (HCC): Status: ACTIVE | Noted: 2020-12-01

## 2021-05-04 PROBLEM — Z95.1 H/O HEART BYPASS SURGERY: Status: ACTIVE | Noted: 2021-01-01

## 2021-05-04 PROBLEM — M16.11 PRIMARY OSTEOARTHRITIS OF RIGHT HIP: Status: ACTIVE | Noted: 2020-08-05

## 2021-05-04 NOTE — PROGRESS NOTES
You have chosen to receive care through a telephone visit. Do you consent to use a telephone visit for your medical care today? Yes    The ABCs of the Annual Wellness Visit  Initial Medicare Wellness Visit    Chief Complaint   Patient presents with   • Medicare Wellness-Initial Visit       Subjective   History of Present Illness:  Sy Florez is a 89 y.o. male who presents for an Initial Medicare Wellness Visit.    HEALTH RISK ASSESSMENT    Recent Hospitalizations:  Recently treated at the following:  Other: [facility]     Current Medical Providers:  Patient Care Team:  John Rodriguez MD as PCP - General (Internal Medicine)  Patricio Mccord MD as Consulting Physician (Hematology and Oncology)  Char Soto APRN as Nurse Practitioner (Oncology)    Smoking Status:  Social History     Tobacco Use   Smoking Status Former Smoker   • Packs/day: 1.00   • Years: 35.00   • Pack years: 35.00   • Quit date:    • Years since quittin.3   Smokeless Tobacco Never Used       Alcohol Consumption:  Social History     Substance and Sexual Activity   Alcohol Use No       Depression Screen:   PHQ-2/PHQ-9 Depression Screening 2021   Little interest or pleasure in doing things 0   Feeling down, depressed, or hopeless 0   Trouble falling or staying asleep, or sleeping too much -   Feeling tired or having little energy -   Poor appetite or overeating -   Feeling bad about yourself - or that you are a failure or have let yourself or your family down -   Trouble concentrating on things, such as reading the newspaper or watching television -   Moving or speaking so slowly that other people could have noticed. Or the opposite - being so fidgety or restless that you have been moving around a lot more than usual -   Thoughts that you would be better off dead, or of hurting yourself in some way -   Total Score 0   If you checked off any problems, how difficult have these problems made it for you to do your work, take  care of things at home, or get along with other people? -       Fall Risk Screen:  ABY Fall Risk Assessment was completed, and patient is at LOW risk for falls.Assessment completed on:5/4/2021    Health Habits and Functional and Cognitive Screening:  Functional & Cognitive Status 5/4/2021   Do you have difficulty preparing food and eating? No   Do you have difficulty bathing yourself, getting dressed or grooming yourself? No   Do you have difficulty using the toilet? No   Do you have difficulty moving around from place to place? Yes   Do you have trouble with steps or getting out of a bed or a chair? Yes   Current Diet Well Balanced Diet   Dental Exam Up to date   Eye Exam Up to date   Exercise (times per week) 0 times per week   Current Exercise Activities Include None   Do you need help using the phone?  No   Are you deaf or do you have serious difficulty hearing?  Yes   Do you need help with transportation? Yes   Do you need help shopping? No   Do you need help preparing meals?  No   Do you need help with housework?  Yes   Do you need help with laundry? Yes   Do you need help taking your medications? Yes   Do you need help managing money? No   Do you ever drive or ride in a car without wearing a seat belt? No   Have you felt unusual stress, anger or loneliness in the last month? No   Who do you live with? Alone   If you need help, do you have trouble finding someone available to you? No   Have you been bothered in the last four weeks by sexual problems? No   Do you have difficulty concentrating, remembering or making decisions? No         Does the patient have evidence of cognitive impairment? No    Asprin use counseling:On clopidrogel as an alternative (due to ASA contraindication)    Age-appropriate Screening Schedule:  Refer to the list below for future screening recommendations based on patient's age, sex and/or medical conditions. Orders for these recommended tests are listed in the plan section. The  patient has been provided with a written plan.    Health Maintenance   Topic Date Due   • ZOSTER VACCINE (1 of 2) Never done   • DIABETIC EYE EXAM  02/04/2021   • INFLUENZA VACCINE  08/01/2021   • HEMOGLOBIN A1C  09/16/2021   • LIPID PANEL  03/16/2022   • URINE MICROALBUMIN  04/21/2022   • TDAP/TD VACCINES (2 - Td) 12/02/2027          The following portions of the patient's history were reviewed and updated as appropriate:   He  has a past medical history of Actinic keratosis, Acute prostatitis, Anemia, Arthritis, Bursitis of hip, Cellulitis of lower limb, Chronic diastolic (congestive) heart failure (CMS/[facility]) (11/16/2020), Chronic kidney disease, stage 3 (CMS/[facility]), Chronic kidney disease, stage IV (severe) (CMS/[facility]) (7/7/2020), Chronic systolic (congestive) heart failure (CMS/[facility]) - EF 45% per ECHO 7/2020.  Dr. Mireles (11/16/2020), Claudication (CMS/[facility]), Coronary arteriosclerosis, Degenerative joint disease involving multiple joints, Disorder of lumbar spine, Dyslipidemia, Edema of lower extremity, Essential hypertension, Eustachian tube disorder, Gastroesophageal reflux disease, History of right hip replacement - 8/2020. Dr. Sahu (9/2/2020), Hyperlipidemia, Hypomagnesemia - h/o (11/16/2020), Idiopathic peripheral neuropathy, IFG (impaired fasting glucose), Iron deficiency anemia, Ischemic cardiomyopathy (11/16/2020), Leg cramp, Malaise and fatigue, Malignant tumor of prostate (CMS/[facility]), Peripheral vascular disease (CMS/[facility]), Peripheral venous insufficiency, Pneumonia, Pruritic rash, Type 2 diabetes mellitus (CMS/[facility]), Type 2 diabetes mellitus with chronic kidney disease, without long-term current use of insulin (CMS/[facility]), and UTI (urinary tract infection).  He does not have any pertinent problems on file.  He  has a past surgical history that includes Coronary artery bypass graft; endoscopy and colonoscopy; Other surgical history; Other surgical history; Other surgical history; Other surgical history; and  Joint replacement (Left, 08/18/2020).  His family history includes Diabetes in his brother, father, and mother.  He  reports that he quit smoking about 41 years ago. He has a 35.00 pack-year smoking history. He has never used smokeless tobacco. He reports that he does not drink alcohol and does not use drugs.  Current Outpatient Medications   Medication Sig Dispense Refill   • amLODIPine (NORVASC) 5 MG tablet TAKE 1 TABLET BY MOUTH DAILY. 30 tablet 11   • clopidogrel (PLAVIX) 75 MG tablet Take 75 mg by mouth Daily.     • fluticasone (FLONASE) 50 MCG/ACT nasal spray 1 spray into the nostril(s) as directed by provider 2 (Two) Times a Day. Administer 1 spray in each nostril twice daily. 1 bottle 11   • Fluticasone Furoate-Vilanterol (BREO ELLIPTA) 100-25 MCG/INH inhaler Inhale 1 puff Daily. 1 inhaler 11   • folic acid (FOLVITE) 1 MG tablet Take 1 tablet by mouth Daily. 30 tablet 3   • gabapentin (NEURONTIN) 600 MG tablet Take 1 tablet by mouth Every Night. 30 tablet 5   • irbesartan (AVAPRO) 75 MG tablet Take 75 mg by mouth Every Night.     • isosorbide mononitrate (IMDUR) 30 MG 24 hr tablet Take 30 mg by mouth Daily.     • mupirocin (BACTROBAN) 2 % ointment APPLY TOPICALLY 2 (TWO) TIMES A DAY. 22 g 11   • nitroglycerin (NITROSTAT) 0.4 MG SL tablet Place 0.4 mg under the tongue.     • omeprazole (priLOSEC) 40 MG capsule TAKE 1 CAPSULE BY MOUTH EVERY MORNING. 30 capsule 5   • ProAir  (90 Base) MCG/ACT inhaler INHALE 1 PUFF INTO LUNGS BY MOUTH EVERY 4 HOURS AS NEEDED FOR SHORTNESS OF BREATH 8.5 g 0   • ramipril (ALTACE) 5 MG capsule TAKE 1 CAPSULE BY MOUTH DAILY. 30 capsule 5   • rosuvastatin (CRESTOR) 10 MG tablet TAKE 1 TABLET BY MOUTH DAILY. 30 tablet 11   • torsemide (DEMADEX) 10 MG tablet Take 10 mg by mouth Daily.     • Zoster Vac Recomb Adjuvanted 50 MCG/0.5ML reconstituted suspension Inject 0.5 mL into the appropriate muscle as directed by prescriber Every 2 (Two) Months. 1 each 1     Current  Facility-Administered Medications   Medication Dose Route Frequency Provider Last Rate Last Admin   • cyanocobalamin injection 1,000 mcg  1,000 mcg Intramuscular Q28 Days John Rodriguez MD   1,000 mcg at 07/07/20 1328     Current Outpatient Medications on File Prior to Visit   Medication Sig   • amLODIPine (NORVASC) 5 MG tablet TAKE 1 TABLET BY MOUTH DAILY.   • clopidogrel (PLAVIX) 75 MG tablet Take 75 mg by mouth Daily.   • fluticasone (FLONASE) 50 MCG/ACT nasal spray 1 spray into the nostril(s) as directed by provider 2 (Two) Times a Day. Administer 1 spray in each nostril twice daily.   • Fluticasone Furoate-Vilanterol (BREO ELLIPTA) 100-25 MCG/INH inhaler Inhale 1 puff Daily.   • folic acid (FOLVITE) 1 MG tablet Take 1 tablet by mouth Daily.   • gabapentin (NEURONTIN) 600 MG tablet Take 1 tablet by mouth Every Night.   • irbesartan (AVAPRO) 75 MG tablet Take 75 mg by mouth Every Night.   • isosorbide mononitrate (IMDUR) 30 MG 24 hr tablet Take 30 mg by mouth Daily.   • mupirocin (BACTROBAN) 2 % ointment APPLY TOPICALLY 2 (TWO) TIMES A DAY.   • nitroglycerin (NITROSTAT) 0.4 MG SL tablet Place 0.4 mg under the tongue.   • omeprazole (priLOSEC) 40 MG capsule TAKE 1 CAPSULE BY MOUTH EVERY MORNING.   • ProAir  (90 Base) MCG/ACT inhaler INHALE 1 PUFF INTO LUNGS BY MOUTH EVERY 4 HOURS AS NEEDED FOR SHORTNESS OF BREATH   • ramipril (ALTACE) 5 MG capsule TAKE 1 CAPSULE BY MOUTH DAILY.   • rosuvastatin (CRESTOR) 10 MG tablet TAKE 1 TABLET BY MOUTH DAILY.   • torsemide (DEMADEX) 10 MG tablet Take 10 mg by mouth Daily.     Current Facility-Administered Medications on File Prior to Visit   Medication   • cyanocobalamin injection 1,000 mcg     He is allergic to bacitracin, formaldehyde, framycetin, neomycin, and nickel..    Outpatient Medications Prior to Visit   Medication Sig Dispense Refill   • amLODIPine (NORVASC) 5 MG tablet TAKE 1 TABLET BY MOUTH DAILY. 30 tablet 11   • clopidogrel (PLAVIX) 75 MG tablet Take 75  mg by mouth Daily.     • fluticasone (FLONASE) 50 MCG/ACT nasal spray 1 spray into the nostril(s) as directed by provider 2 (Two) Times a Day. Administer 1 spray in each nostril twice daily. 1 bottle 11   • Fluticasone Furoate-Vilanterol (BREO ELLIPTA) 100-25 MCG/INH inhaler Inhale 1 puff Daily. 1 inhaler 11   • folic acid (FOLVITE) 1 MG tablet Take 1 tablet by mouth Daily. 30 tablet 3   • gabapentin (NEURONTIN) 600 MG tablet Take 1 tablet by mouth Every Night. 30 tablet 5   • irbesartan (AVAPRO) 75 MG tablet Take 75 mg by mouth Every Night.     • isosorbide mononitrate (IMDUR) 30 MG 24 hr tablet Take 30 mg by mouth Daily.     • mupirocin (BACTROBAN) 2 % ointment APPLY TOPICALLY 2 (TWO) TIMES A DAY. 22 g 11   • nitroglycerin (NITROSTAT) 0.4 MG SL tablet Place 0.4 mg under the tongue.     • omeprazole (priLOSEC) 40 MG capsule TAKE 1 CAPSULE BY MOUTH EVERY MORNING. 30 capsule 5   • ProAir  (90 Base) MCG/ACT inhaler INHALE 1 PUFF INTO LUNGS BY MOUTH EVERY 4 HOURS AS NEEDED FOR SHORTNESS OF BREATH 8.5 g 0   • ramipril (ALTACE) 5 MG capsule TAKE 1 CAPSULE BY MOUTH DAILY. 30 capsule 5   • rosuvastatin (CRESTOR) 10 MG tablet TAKE 1 TABLET BY MOUTH DAILY. 30 tablet 11   • torsemide (DEMADEX) 10 MG tablet Take 10 mg by mouth Daily.       Facility-Administered Medications Prior to Visit   Medication Dose Route Frequency Provider Last Rate Last Admin   • cyanocobalamin injection 1,000 mcg  1,000 mcg Intramuscular Q28 Days John Rodriguez MD   1,000 mcg at 07/07/20 1328       Patient Active Problem List   Diagnosis   • UTI (urinary tract infection)   • Pruritic rash   • Pneumonia   • Peripheral venous insufficiency   • Peripheral vascular disease (CMS/HCC)   • Malignant tumor of prostate (CMS/HCC)   • Leg cramp   • Idiopathic peripheral neuropathy   • Gastroesophageal reflux disease   • Eustachian tube disorder   • Hyperlipidemia associated with type 2 diabetes mellitus (CMS/HCC)   • Disorder of lumbar spine   •  Degenerative joint disease involving multiple joints   • Coronary arteriosclerosis   • Claudication (CMS/[facility])   • Cellulitis of lower limb   • Bursitis of hip   • Arthritis   • Iron deficiency anemia   • Acute prostatitis   • Malaise and fatigue   • Essential hypertension   • Type 2 diabetes mellitus with stage 4 chronic kidney disease, without long-term current use of insulin (CMS/[facility])   • Actinic keratosis   • Edema of lower extremity   • Iron deficiency anemia due to chronic blood loss   • B12 deficiency   • Chronic kidney disease, stage IV (severe) (CMS/[facility])   • History of right hip replacement - 8/2020. Dr. Sahu   • Ischemic cardiomyopathy   • Chronic systolic (congestive) heart failure (CMS/[facility]) - EF 45% per ECHO 7/2020.  Dr. Mireles   • Chronic diastolic (congestive) heart failure (CMS/[facility])   • Hypomagnesemia - h/o   • RADHA (acute kidney injury) (CMS/[facility])   • Anemia of chronic disease   • NSTEMI (non-ST elevated myocardial infarction) (CMS/[facility])   • Chronic pain   • Exertional angina (CMS/[facility])   • H/O arthroscopic knee surgery   • Hematuria   • History of community acquired pneumonia   • History of hyperlipidemia   • Left ventricular ejection fraction less than 40 percent   • Luetscher's syndrome   • MVP (mitral valve prolapse)   • Olecranon fracture   • Peripheral vascular disease of extremity with claudication (CMS/[facility])   • Preop cardiovascular exam   • Primary osteoarthritis of both hips   • Primary osteoarthritis of right hip   • H/O heart bypass surgery   • Tibial plateau fracture       Advanced Care Planning:  ACP discussion was held with the patient during this visit. Patient has an advance directive in EMR which is still valid.     Review of Systems    Compared to one year ago, the patient feels his physical health is the same.  Compared to one year ago, the patient feels his mental health is the same.    Reviewed chart for potential of high risk medication in the elderly: yes  Reviewed chart for  "potential of harmful drug interactions in the elderly:yes    Objective         Vitals:    05/04/21 1112   Weight: 73.5 kg (162 lb)   Height: 175.3 cm (69\")   PainSc:   4   PainLoc: Leg  Comment: bilateral   Blood pressure measurement was not obtained during this telephone visit due to coronavirus pandemic    Body mass index is 23.92 kg/m².  Discussed the patient's BMI with him. The BMI is in the acceptable range.    Physical Exam    Lab Results   Component Value Date    TRIG 361 (H) 03/16/2021    HDL 45 03/16/2021     (H) 03/16/2021    VLDL 61 (H) 03/16/2021    HGBA1C 5.86 (H) 03/16/2021        Assessment/Plan   Medicare Risks and Personalized Health Plan  CMS Preventative Services Quick Reference  Advance Directive Discussion  Chronic Pain   Fall Risk  Immunizations Discussed/Encouraged (specific immunizations; Shingrix )    The above risks/problems have been discussed with the patient.  The patient is encouraged to continue to obtain annual diabetic eye exam from Dr. Vela.  The patient agrees to Shingrix vaccination.  I have sent a prescription to his local pharmacy.  Pertinent information has been shared with the patient in the After Visit Summary.  Follow up plans and orders are seen below in the Assessment/Plan Section.    Diagnoses and all orders for this visit:    1. Medicare annual wellness visit, initial (Primary)    2. Need for shingles vaccine    Other orders  -     Zoster Vac Recomb Adjuvanted 50 MCG/0.5ML reconstituted suspension; Inject 0.5 mL into the appropriate muscle as directed by prescriber Every 2 (Two) Months.  Dispense: 1 each; Refill: 1      Follow Up:  Return in about 1 year (around 5/4/2022) for Medicare Wellness.     An After Visit Summary and PPPS were given to the patient.             "

## 2021-06-10 NOTE — PROGRESS NOTES
Chief Complaint  Follow-up (urgent care FU went to the [facility] on 6/6/21), Wheezing, and Shortness of Breath    Subjective          Sy Florez presents to [facility]  History of Present Illness    Grant experienced acute onset of symptoms consistent with COPD exacerbation last weekend.  He went to [facility] urgent care on Sunday and was treated for acute bronchitis/COPD exacerbation with albuterol nebulizer treatments, steroid injection, and Augmentin.  COVID-19 screen was negative.  BNP was elevated, but it is chronically elevated due to his ischemic cardiomyopathy and stage IV CKD.  Chest x-ray revealed no evidence of CHF exacerbation or pneumonia.  Renal function was somewhat worse than his baseline.  He reports more than 50% improvement with these interventions, but is still type and has a few wheezes.  They told him to not take Breo inhaler while on the albuterol, but I encouraged him to resume the Breo inhaler.    His blood pressure is well controlled today.  He is not in respiratory distress.  Respiratory status is not at baseline.    He wants to discuss doing more for his chronic pain.  He has had a goal of avoiding chronic narcotics.  He continues on Neurontin.  He has multiple sources of chronic pain including extensive osteoarthritis and DJD and neuropathy.  He has vascular and/or neurogenic claudication with no further surgical remedies offered.  We discussed options.  He would like to have some tramadol to take rarely when hurting so much that he cannot sleep or tolerate the pain during the day.  He tends to have a fairly high pain tolerance.  He understands the potential side effects and consequences of narcotic pain relief including possible sedation, constipation, urinary retention, cognitive effects.  He wishes to proceed with a trial of tramadol.  We discussed the benefits of taking a Tylenol tablet with each dose of tramadol.    Objective   Vital  "Signs:   /78   Ht 175.3 cm (69\")   Wt 73.5 kg (162 lb)   BMI 23.92 kg/m²     Physical Exam  Vitals reviewed.   Constitutional:       General: He is not in acute distress.     Appearance: He is well-developed.      Comments: Very pleasant gentleman.  Frail.   HENT:      Head: Normocephalic and atraumatic.      Nose:      Right Sinus: No maxillary sinus tenderness or frontal sinus tenderness.      Left Sinus: No maxillary sinus tenderness or frontal sinus tenderness.      Mouth/Throat:      Mouth: Mucous membranes are moist. No oral lesions.      Pharynx: Uvula midline.      Tonsils: No tonsillar exudate.   Eyes:      Conjunctiva/sclera: Conjunctivae normal.      Pupils: Pupils are equal, round, and reactive to light.   Neck:      Thyroid: No thyroid mass or thyromegaly.      Vascular: No carotid bruit or JVD.      Trachea: Trachea normal. No tracheal deviation.   Cardiovascular:      Rate and Rhythm: Normal rate and regular rhythm.  No extrasystoles are present.     Chest Wall: PMI is not displaced.      Heart sounds: Normal heart sounds. No murmur heard.        Comments: CABG scar noted.  Distant heart sounds.  Pulses diminished distally  Pulmonary:      Effort: Pulmonary effort is normal. No accessory muscle usage or respiratory distress.      Breath sounds: Wheezing and rhonchi present. No decreased breath sounds or rales.      Comments: Some expiratory wheezes as well as chronic lung sounds bilaterally and a few scattered rhonchi on expiratory phase of cough.  No crackles/rales noted.  Abdominal:      General: Bowel sounds are normal. There is no distension.      Palpations: Abdomen is soft.      Tenderness: There is no abdominal tenderness.   Musculoskeletal:      Cervical back: Neck supple.      Comments: Extensive arthritic changes of multiple joints.  Arthritic gait.   Lymphadenopathy:      Cervical: No cervical adenopathy.   Skin:     General: Skin is warm and dry.      Findings: No rash.      " Nails: There is no clubbing.   Neurological:      General: No focal deficit present.      Mental Status: He is alert and oriented to person, place, and time. Mental status is at baseline.      Cranial Nerves: No cranial nerve deficit.      Coordination: Coordination normal.   Psychiatric:         Mood and Affect: Mood normal.         Speech: Speech normal.         Behavior: Behavior normal.         Thought Content: Thought content normal.         Judgment: Judgment normal.        Result Review :     Common labs    Common Labsle 3/16/21 3/16/21 3/16/21 3/16/21 3/16/21 4/21/21 4/21/21 4/21/21 4/27/21    0717 0717 0717 0717 0717 0719 0719 0719    Glucose 108 (A)      107 (A)     BUN 59 (A)      48 (A)     Creatinine 2.54 (A)      2.23 (A)     eGFR Non  Am 24 (A)      28 (A)     Sodium 139      141     Potassium 5.0      4.7     Chloride 102      103     Calcium 9.5      9.5     Albumin 4.20      4.00     Total Bilirubin 0.6      1.5 (A)     Alkaline Phosphatase 176 (A)      170 (A)     AST (SGOT) 24      24     ALT (SGPT) 18      17     WBC   6.96     6.22 9.51   Hemoglobin   13.4     12.4 (A) 11.8 (A)   Hematocrit   42.1     38.5 36.3 (A)   Platelets   178     195 198   Total Cholesterol  210 (A)          Triglycerides  361 (A)          HDL Cholesterol  45          LDL Cholesterol   104 (A)          Hemoglobin A1C    5.86 (A)        PSA     0.081       Uric Acid      9.4 (A)      (A) Abnormal value            Most Recent A1C    HGBA1C Most Recent 3/16/21   Hemoglobin A1C 5.86 (A)   (A) Abnormal value            Data reviewed: Radiologic studies Chest x-ray interpretation 6/6/2021 and [facility] urgent care note 6/6/2021          Assessment and Plan    Diagnoses and all orders for this visit:    1. COPD with exacerbation (CMS/HCC) (Primary)  -     traMADol (ULTRAM) 50 MG tablet; 1 po qid prn pain. Take tylenol 500mg with each dose  Dispense: 50 tablet; Refill: 0    2. Elevated brain natriuretic peptide  (BNP) level    3. Chronic systolic (congestive) heart failure (CMS/HCC) - EF 45% per ECHO 7/2020.  Dr. Mireles    4. Chronic kidney disease, stage IV (severe) (CMS/HCC)    5. Essential hypertension    6. Primary osteoarthritis involving multiple joints    7. Arthritis    8. Idiopathic peripheral neuropathy    Other orders  -     predniSONE (DELTASONE) 10 MG tablet; 1 by mouth 3 times a day times 4 days, then 1 by mouth twice a day times 4 days  Dispense: 20 tablet; Refill: 0      For the COPD exacerbation, I will have him on some prednisone for the next 8 days.  Continue the albuterol nebs 3 times daily as needed.  Resume the Breo inhaler daily.  Complete the course of Augmentin, he has 5 or 6 days left.  Notify me if not back to baseline by next week.    His hydration status appears to be adequate today with the current dose of Demadex.  Continue his current CHF/cardiomyopathy management.  Last EF was 45%, but he also has some diastolic dysfunction.    Continue to avoid NSAIDs and other nephrotoxic drugs in this patient with severe stage IV CKD.    Continue the Neurontin for his sources of chronic pain.  Continue to avoid NSAIDs.  I have given him tramadol 50 mg 4 times daily as needed, #50, no refill.  He has completed a prescribers agreement.  Take 500 mg of Tylenol with each dose of tramadol as needed for times of uncontrolled pain.  He plans on using them quite sparingly.  Watch closely for the side effects mentioned above, especially constipation or urinary retention.  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 Patient understands the risks associated with this controlled medication, including tolerance and addiction.  he also agrees to only obtain this medication from me, and not from a another provider, unless that provider is covering for me in my absence.  he also agrees to be compliant in dosing, and not self adjust the dose of medication.  A signed controlled substance agreement is on file,  and he has received a controlled substance education sheet at this or a previous visit.  he has also signed a consent for treatment with a controlled substance as per [facility] policy. TRAE was obtained.        I spent 45 minutes caring for Sy on this date of service. This time includes time spent by me in the following activities:preparing for the visit, reviewing tests, obtaining and/or reviewing a separately obtained history, performing a medically appropriate examination and/or evaluation , counseling and educating the patient/family/caregiver, ordering medications, tests, or procedures, documenting information in the medical record and independently interpreting results and communicating that information with the patient/family/caregiver  Follow Up   No follow-ups on file.  Patient was given instructions and counseling regarding his condition or for health maintenance advice. Please see specific information pulled into the AVS if appropriate.

## 2021-09-02 NOTE — PROGRESS NOTES
DATE OF VISIT: 8/24/2021      REASON FOR VISIT:  Follow-up for iron deficiency anemia secondary to GI blood loss and CKD        HISTORY OF PRESENT ILLNESS: 89-year-old male with a past medical history significant for history of peripheral vascular disease status post femoropopliteal bypass, prostate cancer status post seed in 2001, history of coronary artery disease status post CABG was initially seen in consultation on December 20, 2017 for evaluation of iron deficiency anemia and not able to tolerate borderline. He has received IV iron in past; most recently received 2 doses on 11/23/2020 and 12/11/2020.       Past Medical History, Past Surgical History, Social History, Family History have been reviewed and are without significant changes except as mentioned.    Review of Systems   A comprehensive 14 point review of systems was performed and was negative except as mentioned.  +fatigue and weakness  Medications:  The current medication list was reviewed in the EMR    ALLERGIES:    Allergies   Allergen Reactions   • Bacitracin Rash   • Formaldehyde Rash   • Framycetin Rash     All Fragrances   • Neomycin Rash   • Nickel Rash       Objective      Vitals:    08/24/21 1045   BP: 130/61   Pulse: (!) 49   Resp: 20   Temp: 96.5 °F (35.8 °C)   TempSrc: Temporal   Weight: 78.1 kg (172 lb 3.2 oz)   PainSc:   5   PainLoc: Leg     Current Status 4/27/2021   ECOG score 0       Physical Exam  GENERAL:  Not in any distress.     HEENT:  Normocephalic, Atraumatic.Mild Conjunctival pallor. No icterus.  No Facial Asymmetry noted.     NECK:  No adenopathy. No JVD.     RESPIRATORY:  Fair air entry bilateral. No rhonchi or wheezing.     CARDIOVASCULAR:  S1, S2. Regular rate and rhythm. No murmur or gallop appreciated.     ABDOMEN:  Soft, obese, nontender. Bowel sounds present in all four quadrants.  No organomegaly appreciated.     EXTREMITIES:  No edema.No Calf Tenderness.     NEUROLOGIC:  Alert, awake and oriented ×3.   SKIN : chronic  skin changes from sun exposure. No skin lesions identified    RECENT LABS:  Glucose   Date Value Ref Range Status   08/24/2021 151 (H) 65 - 99 mg/dL Final     Sodium   Date Value Ref Range Status   08/24/2021 139 136 - 145 mmol/L Final   11/28/2019 141 136 - 145 mmol/L Final     Potassium   Date Value Ref Range Status   08/24/2021 4.9 3.5 - 5.2 mmol/L Final   09/19/2020 4.4 3.3 - 5.0 mmol/L Final     CO2   Date Value Ref Range Status   08/24/2021 24.0 22.0 - 29.0 mmol/L Final     Chloride   Date Value Ref Range Status   08/24/2021 102 98 - 107 mmol/L Final   11/28/2019 105 98 - 107 mmol/L Final     Anion Gap   Date Value Ref Range Status   08/24/2021 13.0 5.0 - 15.0 mmol/L Final     Creatinine   Date Value Ref Range Status   08/24/2021 2.65 (H) 0.76 - 1.27 mg/dL Final   11/28/2019 2.6 (H) 0.7 - 1.3 mg/dL Final     Comment:     **The MDRD GFR formula is valid only for ages 18-70.    GFR will not be reported for individuals aging <18 or >70.     BUN   Date Value Ref Range Status   08/24/2021 57 (H) 8 - 23 mg/dL Final   11/28/2019 51 (H) 7 - 18 mg/dL Final     BUN/Creatinine Ratio   Date Value Ref Range Status   08/24/2021 21.5 7.0 - 25.0 Final     Calcium   Date Value Ref Range Status   08/24/2021 9.2 8.2 - 9.6 mg/dL Final   11/30/2020 9.1 8.1 - 10.2 mg/dL Final     eGFR Non  Amer   Date Value Ref Range Status   08/24/2021 23 (L) >60 mL/min/1.73 Final     Alkaline Phosphatase   Date Value Ref Range Status   08/24/2021 118 (H) 39 - 117 U/L Final   11/28/2019 133 (H) 46 - 116 U/L Final     Total Protein   Date Value Ref Range Status   08/24/2021 6.4 6.0 - 8.5 g/dL Final   11/28/2019 7.1 6.4 - 8.2 g/dL Final     ALT (SGPT)   Date Value Ref Range Status   08/24/2021 11 1 - 41 U/L Final   11/28/2019 23 16 - 63 U/L Final     AST (SGOT)   Date Value Ref Range Status   08/24/2021 14 1 - 40 U/L Final   11/28/2019 69 (H) 15 - 37 U/L Final     Total Bilirubin   Date Value Ref Range Status   08/24/2021 0.4 0.0 - 1.2 mg/dL  Final   11/28/2019 0.5 0.2 - 1.0 mg/dL Final     Albumin   Date Value Ref Range Status   08/24/2021 4.10 3.50 - 5.20 g/dL Final   11/28/2019 3.8 3.4 - 5.0 g/dL Final     Globulin   Date Value Ref Range Status   08/24/2021 2.3 gm/dL Final     Lab Results   Component Value Date    WBC 9.43 08/24/2021    HGB 12.2 (L) 08/24/2021    HCT 36.3 (L) 08/24/2021    MCV 95.5 08/24/2021     08/24/2021     Lab Results   Component Value Date    NEUTROABS 7.81 (H) 08/24/2021    IRON 83 08/24/2021    IRON 74 04/27/2021    IRON 62 03/16/2021    TIBC 302 08/24/2021    TIBC 301 04/27/2021    TIBC 307 03/16/2021    LABIRON 27 08/24/2021    LABIRON 25 04/27/2021    LABIRON 20 03/16/2021    FERRITIN 520.80 (H) 08/24/2021    FERRITIN 786.10 (H) 04/27/2021    FERRITIN 663.00 (H) 03/16/2021    EEPARRWQ45 552 08/24/2021    KFXUGRSB70 743 03/16/2021    TNYXGDWV77 561 01/22/2021    FOLATE >20.00 08/24/2021    FOLATE >20.00 01/22/2021    FOLATE 32.00 (H) 12/01/2020     No results found for: , LABCA2, AFPTM, HCGQUANT, , CHROMGRNA, [id number], CEA, REFLABREPO              RADIOLOGY DATA :  No radiology results for the last 7 days        Assessment/Plan       Anemia; multiple reasons; component of Iron deficiency anemia secondary to possible GI bleed and component of CKD stage IV;  -Most recent dosing of IV iron was 11/23/2020 and 12/11/2020.  -labs reviewed today shows Hgb is 12.2 and iron saturation is 27%; no need for any IV replacement; he continues to have CKD which will also contribute to his anemia.  -will plan to recheck again in 4 mos.         PHQ-9 Total Score: 0     Sy Florez reports a pain score of 5.  Given his pain assessment as noted, treatment options were discussed and the following options were decided upon as a follow-up plan to address the patient's pain: continuation of current treatment plan for pain.         Char Soto, JUAN M  9/2/2021  14:16 CDT        Part of this note may be an electronic  transcription/translation of spoken language to printed text using the Dragon Dictation System.          CC:

## 2021-10-04 NOTE — PROGRESS NOTES
You have chosen to receive care through a telephone visit. Do you consent to use a telephone visit for your medical care today? Yes    Chief Complaint  Follow-up (6 month)    Subjective          History of Present Illness     Sy Florez who receives care via telephone visit for 6-month follow up on stage 4 CKD, CAD, PAD, chronic venous insufficiency, hypertension, high cholesterol, iron deficiency anemia, systolic and diastolic CHF with EF 45%, and extensive peripheral vascular disease, status post femoropopliteal bypass right leg 1997 and left leg 2007.  He feels chronic pain is tolerable with Neurontin, although, is continuing to have pain disrupting his sleep.  He was intolerant to taking a whole Tramadol 50 mg tablet due to excessive daytime sedation/mental fog the following day.  He had similar, although lesser effects with 1/2 tablet.          His diabetes continues to be diet controlled and is stable with A1c 5.7.      Wecontinue to monitor BNP, which is chronically elevated due to his stage IV CKD. However, is acutely elevated at 2800.  He feels lower extremity edema is relatively stable with 1-2 torsemide daily.  He continues to struggle with dyspnea, which he feels is worse upon awakening.  He has not been using his Breo maintenance inhaler, but does notice improvement with the ProAir rescue inhaler.  Dr. Mireles is his cardiologist.  He is scheduled to undergo ECHO this week. No blood pressure obtained today with this telephone visit due to coronavirus pandemic. However, BP was 120/57 with cardiology visit 09/28/2021.     Chronic stage IV kidney disease followed by [facility]. Renal function very slightly declined with creatinine 2.76. Patient follows with Dr. Mccord for iron deficiency anemia secondary to GI blood loss and CKD.  Hemoglobin is gradually declining, although, remains acceptable at 12.1.     He has been fully vaccinated for COVID-19 including booster dose of Pfizer vaccine  "09/09/2021.             The patient's relevant past medical, surgical, and social history was reviewed in Epic.   Lab results are reviewed with the patient today. Fasting glucose 120.  Renal function stable with very gradual decline.  Normal liver function. Normal thyroid screen.  Cholesterol excellent with LDL 75.  Iron stable.         Objective   Vital Signs:   Ht 175.3 cm (69\")   Wt 78 kg (172 lb)   BMI 25.40 kg/m²       Physical Exam   Result Review :     CMP    CMP 7/27/21 8/24/21 9/29/21   Glucose 102 (A) 151 (A) 120 (A)   BUN 53 (A) 57 (A) 63 (A)   Creatinine 2.47 (A) 2.65 (A) 2.76 (A)   eGFR Non African Am 25 (A) 23 (A) 22 (A)   Sodium 140 139 144   Potassium 4.9 4.9 4.7   Chloride 105 102 106   Calcium 9.2 9.2 9.8   Albumin 4.20 4.10 4.50   Total Bilirubin 0.5 0.4 0.6   Alkaline Phosphatase 135 (A) 118 (A) 125   AST (SGOT) 22 14 18   ALT (SGPT) 15 11 18   (A) Abnormal value            CBC w/diff    CBC w/Diff 7/27/21 8/24/21 9/29/21   WBC 6.91 9.43 7.61   RBC 3.92 (A) 3.80 (A) 3.82 (A)   Hemoglobin 12.8 (A) 12.2 (A) 12.1 (A)   Hematocrit 38.9 36.3 (A) 38.8   MCV 99.2 (A) 95.5 101.6 (A)   MCH 32.7 32.1 31.7   MCHC 32.9 33.6 31.2 (A)   RDW 15.2 15.1 15.4   Platelets 195 195 197   Neutrophil Rel %  82.8 (A) 77.0 (A)   Immature Granulocyte Rel %  0.8 (A)    Lymphocyte Rel %  8.0 (A) 10.5 (A)   Monocyte Rel %  6.6 10.1   Eosinophil Rel %  1.1 1.6   Basophil Rel %  0.7 0.8   (A) Abnormal value            Lipid Panel    Lipid Panel 11/11/20 3/16/21 9/29/21   Total Cholesterol  210 (A)    Triglycerides  361 (A)    HDL Cholesterol  45    VLDL Cholesterol  61 (A)    LDL Cholesterol  102 (A) 104 (A) 75   LDL/HDL Ratio  2.06    (A) Abnormal value            TSH    TSH 11/11/20 9/29/21   TSH 1.960 2.000           A1C Last 3 Results    HGBA1C Last 3 Results 11/11/20 3/16/21 9/29/21   Hemoglobin A1C 5.83 (A) 5.86 (A) 5.73 (A)   (A) Abnormal value            PSA    PSA 3/16/21   PSA 0.081           Data reviewed: " Consultant notes Dr. Flowers, cardiology          Assessment and Plan    Diagnoses and all orders for this visit:    1. Type 2 diabetes mellitus with stage 4 chronic kidney disease, without long-term current use of insulin (HCC) (Primary)  -     CBC Auto Differential; Future  -     Comprehensive Metabolic Panel; Future  -     Hemoglobin A1c; Future  -     LDL Cholesterol, Direct; Future  -     Vitamin B12; Future    2. Hyperlipidemia associated with type 2 diabetes mellitus (HCC)  -     LDL Cholesterol, Direct; Future    3. Peripheral vascular disease (HCC)  -     LDL Cholesterol, Direct; Future    4. Essential hypertension  -     Comprehensive Metabolic Panel; Future    5. Chronic systolic (congestive) heart failure (CMS/HCC) - EF 45% per ECHO 7/2020.  Dr. Mireles  -     BNP; Future    6. B12 deficiency  -     CBC Auto Differential; Future  -     Vitamin B12; Future    7. Chronic kidney disease, stage IV (severe) (formerly [facility])  -     Comprehensive Metabolic Panel; Future    8. Other iron deficiency anemia  -     CBC Auto Differential; Future  -     Iron Profile; Future    9. Claudication (formerly [facility])  -     gabapentin (NEURONTIN) 600 MG tablet; Take 1.5 tablets by mouth Every Night.  Dispense: 45 tablet; Refill: 5  -     LDL Cholesterol, Direct; Future    Other orders  -     Fluticasone Furoate-Vilanterol (Breo Ellipta) 100-25 MCG/INH inhaler; Inhale 1 puff Daily.  Dispense: 1 each; Refill: 11  -     albuterol sulfate  (90 Base) MCG/ACT inhaler; Inhale 2 puffs Every 4 (Four) Hours As Needed for Wheezing.  Dispense: 18 g; Refill: 11         I spent 30 minutes caring for Sy on this date of service. This time includes time spent by me in the following activities:preparing for the visit, reviewing tests, obtaining and/or reviewing a separately obtained history, counseling and educating the patient/family/caregiver, ordering medications, tests, or procedures, documenting information in the medical record and independently  interpreting results and communicating that information with the patient/family/caregiver     He can try increasing Neurontin 600 mg to 1.5 tablets nightly to help manage nighttime chronic pain.  He had excessive daytime sedation and mental fog with the Tramadol.   Notify us if this medication increase does not help relieve nighttime chronic pain disrupting sleep.        Renal function continues to very gradually decline.  Continue to avoid NSAIDs and other nephrotoxic drugs.    Resume use of Breo inhaler daily.  I refilled this and albuterol rescue inhaler.  He felt his respiratory status was better when using the Breo.    Continue the current vascular risk factor modifications including Crestor and antiplatelet agents.  Continue BP medications.  Keep scheduled follow up visits with Dr. Mireles, cardiology.  He is scheduled for ECHO next week.      Mild CHF symptoms are stable.  We will continue to monitor BNP.  Continue the daily torsemide.  He can take an extra dose of torsemide 2 days weekly, although, not consecutive days.  Continue management plan for chronic venous insufficiency including maintaining a goal body weight, elevating lower extremities when not ambulating, sodium restriction and compliance with wearing the compression stockings.     Continue follow up visits as scheduled with Dr. Mccord to address iron deficiency anemia secondary to GI blood loss and CKD.    Return in six months for follow up with fasting labs one week prior.     Follow Up   Return in about 6 months (around 4/4/2022) for Follow up in six months with labs one week prior..  Patient was given instructions and counseling regarding his condition or for health maintenance advice. Please see specific information pulled into the AVS if appropriate.

## 2021-11-09 NOTE — PROGRESS NOTES
Chief Complaint  Follow-up (post hospital [facility] 11/04/2021 for Bronchitis. He was given Decadron injections and Z shon . He states bronchitis has cleared up but is SOB), Shortness of Breath (he is using nebs but doesnt have oxygen), Immunizations (flu vaccine given left deltoid and tolerated procedure well ), and Leg Swelling    Subjective          History of Present Illness     Sy Florez is our 89-year-old male patient with multiple complex medical issues including stage 4 CKD, CAD, PAD, chronic venous insufficiency, hypertension, high cholesterol, iron deficiency anemia, systolic and diastolic CHF with most recent EF 15-20% ECHO 10/2021, and extensive peripheral vascular disease.      He presents today to the office accompanied by his son, Lisandro, for ER follow up.  Family had sent a note on My Chart last week concerned with their father's declining health.  He was seen in the ER at [facility] 11/04/2021 and diagnosed with bronchitis treated with Decadron and Z-pack.   His cardiologist is Dr. Mireles.  Recent ECHO 10/07/2021 revealed ejection fraction 15-20%.  His poor renal function limits treatment treatment options including Entresto and no medication changes were made by Dr. Mireles with most recent visit.  Patient was switched from Lasix to torsemide 10 mg daily by nephrology approximately a year ago.  He is currently having some weeping edema.  He is describing significant PND and orthopnea.  We discussed a second opinion regarding treatment options.  Patient agrees to see if there are available options to consider to make him more comfortable.       He is having poor exercise tolerance with even mild activity exhausting him and resulting in shortness of breath.  He does not have home oxygen, but has been using duo nebs 2 to 3 times daily with one treatment in the evening around suppertime.  He has not noticed significant improvement with neb treatments.  He continues on Breo inhaler.  He is reporting  "orthopnea symptoms at night, for  which he has to sit up in a kitchen chair for relief.  Last night was the worse night he has had thus far.  He could not catch his breath and had to get up.  Otherwise, he is going to be about 9:00 p.m. and getting about 4 hours of sleep before waking due to orthopnea and having to transfer to a chair in the kitchen. They have a wedge for the bed, although, patient has resisted, as he has always laid flat in the bed.  His oxygen never got below 90% with exertion today with walking in the hallways.  We will arrange for overnight oximetry testing through [facility]. In the interim, I recommended we go ahead and start supplemental nighttime oxygen to help keep him comfortable.  They are not yet ready to consider hospice, but are very realistic about his prognosis.         Patient was fully vaccinated for COVID.  He had booster 09/09/2021.  He has not had flu vaccine, which is given in the office today.       Objective   Vital Signs:   /76   Pulse 84   Temp 97 °F (36.1 °C) (Infrared)   Ht 175.3 cm (69\")   Wt 81.4 kg (179 lb 6.4 oz)   SpO2 94%   BMI 26.49 kg/m²         Physical Exam  Vitals reviewed.   Constitutional:       General: He is not in acute distress.     Appearance: He is well-developed.      Comments: Elderly gentleman.  Accompanied by son, Lisandro. Ambulating with walker.    HENT:      Head: Normocephalic and atraumatic.      Nose:      Right Sinus: No maxillary sinus tenderness or frontal sinus tenderness.      Left Sinus: No maxillary sinus tenderness or frontal sinus tenderness.      Mouth/Throat:      Mouth: No oral lesions.      Pharynx: Uvula midline.      Tonsils: No tonsillar exudate.   Eyes:      Conjunctiva/sclera: Conjunctivae normal.      Pupils: Pupils are equal, round, and reactive to light.   Neck:      Thyroid: No thyroid mass or thyromegaly.      Vascular: No carotid bruit or JVD.      Trachea: Trachea normal. No tracheal deviation. "   Cardiovascular:      Rate and Rhythm: Normal rate and regular rhythm.  No extrasystoles are present.     Chest Wall: PMI is not displaced.      Heart sounds: Normal heart sounds. No murmur heard.      Pulmonary:      Effort: Pulmonary effort is normal. No accessory muscle usage or respiratory distress.      Breath sounds: Normal breath sounds. No decreased breath sounds, wheezing, rhonchi or rales.      Comments: Chronic lung sounds with bibasilar crackles.  Abdominal:      General: Bowel sounds are normal. There is no distension.      Palpations: Abdomen is soft.      Tenderness: There is no abdominal tenderness.   Musculoskeletal:      Cervical back: Neck supple.   Lymphadenopathy:      Cervical: No cervical adenopathy.   Skin:     General: Skin is warm and dry.      Findings: No rash.      Nails: There is no clubbing.   Neurological:      Mental Status: He is alert and oriented to person, place, and time.      Cranial Nerves: No cranial nerve deficit.      Coordination: Coordination normal.   Psychiatric:         Speech: Speech normal.         Behavior: Behavior normal.         Thought Content: Thought content normal.         Judgment: Judgment normal.          Result Review :     Common labs    Common Labsle 8/24/21 8/24/21 9/29/21 9/29/21 9/29/21 9/29/21 9/29/21 11/3/21 11/3/21    1006 1006 [id number] [id number]   Glucose  151 (A)  120 (A)     120 (A)   BUN  57 (A)  63 (A)     70 (A)   Creatinine  2.65 (A)  2.76 (A)     2.80 (A)   eGFR Non African Am  23 (A)  22 (A)     21 (A)   Sodium  139  144     142   Potassium  4.9  4.7     4.4   Chloride  102  106     106   Calcium  9.2  9.8     9.2   Albumin  4.10  4.50     4.30   Total Bilirubin  0.4  0.6        Alkaline Phosphatase  118 (A)  125        AST (SGOT)  14  18        ALT (SGPT)  11  18        WBC 9.43  7.61         Hemoglobin 12.2 (A)  12.1 (A)     11.5 (A)    Hematocrit 36.3 (A)  38.8     37.1 (A)    Platelets 195  197         LDL Cholesterol        75      Hemoglobin A1C     5.73 (A)       Microalbumin, Urine       2.4     (A) Abnormal value            Data reviewed: Cardiology studies ECHO 10/07/2021, Consultant notes Dr. Mireles, cardiology and Recent hospitalization notes ER visit [facility] 11/04/2021           Future Appointments   Date Time Provider Department Center   4/11/2022  1:00 PM John Rodriguez MD [facility]     Assessment and Plan    Diagnoses and all orders for this visit:    1. Ischemic cardiomyopathy (Primary)  -     CBC Auto Differential; Future  -     Comprehensive Metabolic Panel; Future  -     BNP; Future  -     XR Chest PA & Lateral; Future  -     Ambulatory Referral to Cardiology    2. Chronic systolic (congestive) heart failure (CMS/HCC) - EF 15-20% per ECHO 7/2020.  Dr. Mireles  -     CBC Auto Differential; Future  -     Comprehensive Metabolic Panel; Future  -     BNP; Future  -     XR Chest PA & Lateral; Future  -     Ambulatory Referral to Cardiology    3. Acute systolic CHF (congestive heart failure) (HCC)  -     Ambulatory Referral to Cardiology    4. Chronic kidney disease, stage IV (severe) (HCC)  -     Ambulatory Referral to Cardiology    Other orders  -     Fluzone High-Dose 65+yrs  -     torsemide (DEMADEX) 10 MG tablet; 1-2 tablets po QD for fluid/swelling  Dispense: 60 tablet; Refill: 11         I spent 42 minutes caring for Sy on this date of service. This time includes time spent by me in the following activities:preparing for the visit, reviewing tests, obtaining and/or reviewing a separately obtained history, performing a medically appropriate examination and/or evaluation , counseling and educating the patient/family/caregiver, ordering medications, tests, or procedures, documenting information in the medical record and independently interpreting results and communicating that information with the patient/family/caregiver     He is experiencing an acute exacerbation of chronic systolic CHF.  He does not appear  to need hospitalization yet.  We will check BNP, CMP, CBC, and chest x-ray on his way out today.   We will arrange for overnight oximetry testing.  In the interim, especially with his reported difficulty breathing at night, especially difficult last night, we will send a prescription for supplemental oxygen 2 liters per nasal cannula nightly and as needed.  Continue the nebulizer treatments 3 to 4 times daily with a treatment closer to bedtime.  Increase Torsemide to 2 daily taking his first dose q.a.m. and second dose about 2:00 to 3:00 p.m.  As swelling improves, he may be able to decrease to the afternoon dose of Torsemide.  I recommended they purchase an O2 sat monitor to have in the home to monitor oxygen levels.      We will refer to Dr. Dial, cardiologist, for second opinion on the CHF in this patient with EF 15-20% ECHO 10/2021.  I have discussed the case with Dr. Dial today, who has very kindly agreed to see him in the [place] office this week on Thursday.  I appreciate his assistance with this very nice gentleman    After informed verbal consent, patient is given high dose influenza vaccine.  He tolerated well.     We will plan to see him back for routine follow up in April or sooner if needed.     Scribed for Dr. Rodriguez by oJhanna Reyes [facility].     Follow Up   Return if symptoms worsen or fail to improve, for Next scheduled follow up.  Patient was given instructions and counseling regarding his condition or for health maintenance advice. Please see specific information pulled into the AVS if appropriate.

## 2021-11-22 NOTE — TELEPHONE ENCOUNTER
Daughter called and they are having difficulty breaking Norvasc in half and request Norvasc 2.5 mg to be sent to pharmacy. TP

## 2021-12-28 NOTE — TELEPHONE ENCOUNTER
Family, daughter Lara called and states they are ready for referral to Hospice. I called hospice and I will send the referral. TP

## 2022-01-01 RX ORDER — IPRATROPIUM BROMIDE AND ALBUTEROL SULFATE 2.5; .5 MG/3ML; MG/3ML
SOLUTION RESPIRATORY (INHALATION)
Qty: 360 ML | Refills: 1 | Status: SHIPPED | OUTPATIENT
Start: 2022-01-01